# Patient Record
Sex: FEMALE | Race: WHITE | NOT HISPANIC OR LATINO | Employment: FULL TIME | URBAN - METROPOLITAN AREA
[De-identification: names, ages, dates, MRNs, and addresses within clinical notes are randomized per-mention and may not be internally consistent; named-entity substitution may affect disease eponyms.]

---

## 2017-09-19 ENCOUNTER — HOSPITAL ENCOUNTER (OUTPATIENT)
Dept: RADIOLOGY | Facility: MEDICAL CENTER | Age: 57
End: 2017-09-19
Attending: NURSE PRACTITIONER
Payer: COMMERCIAL

## 2017-09-19 DIAGNOSIS — S63.502A SPRAIN OF LEFT WRIST, INITIAL ENCOUNTER: ICD-10-CM

## 2017-09-19 PROCEDURE — 73110 X-RAY EXAM OF WRIST: CPT | Mod: LT

## 2018-02-13 ENCOUNTER — OFFICE VISIT (OUTPATIENT)
Dept: MEDICAL GROUP | Facility: MEDICAL CENTER | Age: 58
End: 2018-02-13
Payer: COMMERCIAL

## 2018-02-13 VITALS
HEART RATE: 76 BPM | TEMPERATURE: 98.7 F | BODY MASS INDEX: 34.12 KG/M2 | HEIGHT: 60 IN | DIASTOLIC BLOOD PRESSURE: 88 MMHG | OXYGEN SATURATION: 98 % | SYSTOLIC BLOOD PRESSURE: 134 MMHG | WEIGHT: 173.8 LBS | RESPIRATION RATE: 16 BRPM

## 2018-02-13 DIAGNOSIS — K21.9 GASTROESOPHAGEAL REFLUX DISEASE, ESOPHAGITIS PRESENCE NOT SPECIFIED: ICD-10-CM

## 2018-02-13 DIAGNOSIS — E66.9 OBESITY (BMI 30-39.9): ICD-10-CM

## 2018-02-13 DIAGNOSIS — E11.9 TYPE 2 DIABETES MELLITUS WITHOUT COMPLICATION, WITHOUT LONG-TERM CURRENT USE OF INSULIN (HCC): ICD-10-CM

## 2018-02-13 DIAGNOSIS — I10 ESSENTIAL HYPERTENSION: ICD-10-CM

## 2018-02-13 DIAGNOSIS — Z76.89 ENCOUNTER TO ESTABLISH CARE: ICD-10-CM

## 2018-02-13 DIAGNOSIS — Z12.31 VISIT FOR SCREENING MAMMOGRAM: ICD-10-CM

## 2018-02-13 DIAGNOSIS — E78.5 DYSLIPIDEMIA: ICD-10-CM

## 2018-02-13 PROCEDURE — 99204 OFFICE O/P NEW MOD 45 MIN: CPT | Performed by: PHYSICIAN ASSISTANT

## 2018-02-13 RX ORDER — LISINOPRIL 10 MG/1
10 TABLET ORAL DAILY
COMMUNITY
End: 2018-03-28 | Stop reason: SDUPTHER

## 2018-02-13 RX ORDER — IBUPROFEN 800 MG/1
800 TABLET ORAL EVERY 8 HOURS PRN
COMMUNITY
End: 2018-08-01 | Stop reason: SDUPTHER

## 2018-02-13 RX ORDER — ROSUVASTATIN CALCIUM 40 MG/1
40 TABLET, COATED ORAL DAILY
COMMUNITY
End: 2018-08-01 | Stop reason: SDUPTHER

## 2018-02-13 ASSESSMENT — PATIENT HEALTH QUESTIONNAIRE - PHQ9: CLINICAL INTERPRETATION OF PHQ2 SCORE: 0

## 2018-02-13 NOTE — LETTER
[facility]  Yaya Frank P.A.-C.  [address]  [place] [address]  Fax: [phone]   Authorization for Release/Disclosure of   Protected Health Information   Name: ROSARIO NULL : 1960 SSN: [id number]   Address: [address] [facility] [address]  [place] [address] Phone:    [phone] (home)    I authorize the entity listed below to release/disclose the PHI below to:   [facility]/Yaya Frank P.A.-C. and Yaya Frank P.A.-C.   Provider or Entity Name:  [facility]   Address   City, State, Zip   Phone:      Fax:     Reason for request: continuity of care   Information to be released:    [ X ] LAST COLONOSCOPY,  including any PATH REPORT and follow-up  [  ] LAST FIT/COLOGUARD RESULT [  ] LAST DEXA  [  ] LAST MAMMOGRAM  [  ] LAST PAP  [  ] LAST LABS [  ] RETINA EXAM REPORT  [  ] IMMUNIZATION RECORDS  [  ] Release all info      [  ] Check here and initial the line next to each item to release ALL health information INCLUDING  _____ Care and treatment for drug and / or alcohol abuse  _____ HIV testing, infection status, or AIDS  _____ Genetic Testing    DATES OF SERVICE OR TIME PERIOD TO BE DISCLOSED: _____________  I understand and acknowledge that:  * This Authorization may be revoked at any time by you in writing, except if your health information has already been used or disclosed.  * Your health information that will be used or disclosed as a result of you signing this authorization could be re-disclosed by the recipient. If this occurs, your re-disclosed health information may no longer be protected by State or Federal laws.  * You may refuse to sign this Authorization. Your refusal will not affect your ability to obtain treatment.  * This Authorization becomes effective upon signing and will  on (date) __________.      If no date is indicated, this Authorization will  one (1) year from the signature date.    Name: Rosario Null    Signature:   Date:      2/13/2018       PLEASE FAX REQUESTED RECORDS BACK TO: [phone]

## 2018-02-13 NOTE — ASSESSMENT & PLAN NOTE
Has a chronic history of reflux. Takes omeprazole daily. Symptoms are well controlled. Likely she had a hiatal hernia. Has been scoped in the past. This occurred approximately 5 years ago with her colonoscopy.

## 2018-02-13 NOTE — PROGRESS NOTES
Subjective:   Rosario Blevins is a 57 y.o. female here today for establishing care and discussed diabetes, hyperlipidemia and essential hypertension.    DM (diabetes mellitus)  This is a 57-year-old female who is here today to establish care and to discuss her diabetes. Her PCP recently left his practice. She has a history of diabetes. Is on metformin 500 mg which she takes half a tablet 3 times a day. Medication will cause abdominal discomfort if he takes a full dose. She also is on Tradjenta which is costly for her. Cost about $40 a month. Her A1c recently has been well controlled. He is due to as she is not been done for 6 months. She is no complications with her diabetes.    Dyslipidemia  Cholesterol has been elevated in the past as well. Chronic condition. Takes Crestor 40 mg daily.    GERD (gastroesophageal reflux disease)  Has a chronic history of reflux. Takes omeprazole daily. Symptoms are well controlled. Likely she had a hiatal hernia. Has been scoped in the past. This occurred approximately 5 years ago with her colonoscopy.    HTN (hypertension)  Has chronic hypertension. Is on lisinopril 20 mg daily.    Obesity (BMI 30-39.9)  She eats healthier than in the past. Has lost weight. Continues to lose weight.       Current medicines (including changes today)  Current Outpatient Prescriptions   Medication Sig Dispense Refill   • ibuprofen (MOTRIN) 800 MG Tab Take 800 mg by mouth every 8 hours as needed.     • rosuvastatin (CRESTOR) 40 MG tablet Take 40 mg by mouth every day.     • linagliptin (TRADJENTA) 5 MG Tab tablet Take 5 mg by mouth every day.     • lisinopril (PRINIVIL) 10 MG Tab Take 10 mg by mouth every day.     • metFORMIN (GLUCOPHAGE) 500 MG Tab Take 1 Tab by mouth 3 times a day. 90 Tab 11   • Multiple Vitamins-Minerals (CENTRUM SILVER PO) Take  by mouth every day.     • estradiol (ESTRACE) 1 MG TABS Take 1 mg by mouth every day.     • omeprazole (PRILOSEC) 40 MG capsule Take 20 mg by mouth  every day.       No current facility-administered medications for this visit.      She  has a past medical history of Diabetes; Hyperlipidemia; and Hypertension.    Social History and Family History were reviewed and updated.    ROS   No chest pain, no shortness of breath, no abdominal pain and all other systems were reviewed and are negative.       Objective:     Blood pressure 134/88, pulse 76, temperature 37.1 °C (98.7 °F), resp. rate 16, height 1.524 m (5'), weight 78.8 kg (173 lb 12.8 oz), SpO2 98 %. Body mass index is 33.94 kg/m².   Physical Exam:  Constitutional: Alert, no distress.  Skin: Warm, dry, good turgor, no rashes in visible areas.  Eye: Equal, round and reactive, conjunctiva clear, lids normal.  ENMT: Lips without lesions, good dentition, oropharynx clear.  Neck: Trachea midline, no masses.   Lymph: No cervical or supraclavicular lymphadenopathy  Respiratory: Unlabored respiratory effort, lungs appear clear, no wheezes.  Cardiovascular: Normal S1, S2, no murmur, no edema.  Abdomen: Soft, non-tender, no masses.  Psych: Alert and oriented x3, normal affect and mood.        Assessment and Plan:   The following treatment plan was discussed    1. Type 2 diabetes mellitus without complication, without long-term current use of insulin (CMS-[facility])  Chronic condition. Status unknown. Likely controlled. Reviewed metformin 500 mg 3 times a day. May take half a tablet 3 times a day. Also provided lab orders.  - COMP METABOLIC PANEL; Future  - HEMOGLOBIN A1C; Future  - LIPID PROFILE; Future  - MICROALBUMIN CREAT RATIO URINE (LAB COLLECT); Future  - metFORMIN (GLUCOPHAGE) 500 MG Tab; Take 1 Tab by mouth 3 times a day.  Dispense: 90 Tab; Refill: 11    2. Dyslipidemia  Chronic condition. Stable. Lipid panel ordered. Continue Crestor.    3. Obesity (BMI 30-39.9)  Chronic condition. Improving. Continue eat healthier.  - Patient identified as having weight management issue.  Appropriate orders and counseling  given.    4. Essential hypertension  Chronic condition. Slightly elevated today. Follow-up in 3 months to recheck blood pressure. Continue lisinopril 10 mg daily.    5. Gastroesophageal reflux disease, esophagitis presence not specified  Chronic condition. Stable. Continue omeprazole as directed.    6. Visit for screening mammogram  Ordered mammogram.  - MA-SCREEN MAMMO W/CAD-BILAT; Future    7. Encounter to establish care      Followup: Return if symptoms worsen or fail to improve.    Please note that this dictation was created using voice recognition software. I have made every reasonable attempt to correct obvious errors, but I expect that there are errors of grammar and possibly content that I did not discover before finalizing the note.

## 2018-02-13 NOTE — LETTER
[facility]  Yaya Frank P.A.-C.  [address]  [place] [address]  Fax: [phone]   Authorization for Release/Disclosure of   Protected Health Information   Name: ROSARIO NULL : 1960 SSN: [id number]   Address: [address]  [place] [address] Phone:    [phone] (home)    I authorize the entity listed below to release/disclose the PHI below to:   [facility]/Yaya Frank P.A.-C. and Yaya Frank P.A.-C.   Provider or Entity Name:     Address   City, State, Zip   Phone:      Fax:     Reason for request: continuity of care   Information to be released:    [  ] LAST COLONOSCOPY,  including any PATH REPORT and follow-up  [  ] LAST FIT/COLOGUARD RESULT [  ] LAST DEXA  [  ] LAST MAMMOGRAM  [  ] LAST PAP  [  ] LAST LABS [  ] RETINA EXAM REPORT  [  ] IMMUNIZATION RECORDS  [  ] Release all info      [  ] Check here and initial the line next to each item to release ALL health information INCLUDING  _____ Care and treatment for drug and / or alcohol abuse  _____ HIV testing, infection status, or AIDS  _____ Genetic Testing    DATES OF SERVICE OR TIME PERIOD TO BE DISCLOSED: _____________  I understand and acknowledge that:  * This Authorization may be revoked at any time by you in writing, except if your health information has already been used or disclosed.  * Your health information that will be used or disclosed as a result of you signing this authorization could be re-disclosed by the recipient. If this occurs, your re-disclosed health information may no longer be protected by State or Federal laws.  * You may refuse to sign this Authorization. Your refusal will not affect your ability to obtain treatment.  * This Authorization becomes effective upon signing and will  on (date) __________.      If no date is indicated, this Authorization will  one (1) year from the signature date.    Name: Rosario Null    Signature:   Date:     2018          PLEASE FAX REQUESTED RECORDS BACK TO: [phone]

## 2018-02-13 NOTE — ASSESSMENT & PLAN NOTE
This is a 57-year-old female who is here today to establish care and to discuss her diabetes. Her PCP recently left his practice. She has a history of diabetes. Is on metformin 500 mg which she takes half a tablet 3 times a day. Medication will cause abdominal discomfort if he takes a full dose. She also is on Tradjenta which is costly for her. Cost about $40 a month. Her A1c recently has been well controlled. He is due to as she is not been done for 6 months. She is no complications with her diabetes.

## 2018-02-28 ENCOUNTER — APPOINTMENT (OUTPATIENT)
Dept: RADIOLOGY | Facility: MEDICAL CENTER | Age: 58
End: 2018-02-28
Attending: PHYSICIAN ASSISTANT
Payer: COMMERCIAL

## 2018-03-06 ENCOUNTER — HOSPITAL ENCOUNTER (OUTPATIENT)
Dept: RADIOLOGY | Facility: MEDICAL CENTER | Age: 58
End: 2018-03-06
Attending: PHYSICIAN ASSISTANT
Payer: COMMERCIAL

## 2018-03-06 DIAGNOSIS — Z12.31 VISIT FOR SCREENING MAMMOGRAM: ICD-10-CM

## 2018-03-06 PROCEDURE — 77063 BREAST TOMOSYNTHESIS BI: CPT

## 2018-03-28 ENCOUNTER — PATIENT MESSAGE (OUTPATIENT)
Dept: MEDICAL GROUP | Facility: MEDICAL CENTER | Age: 58
End: 2018-03-28

## 2018-03-28 RX ORDER — LISINOPRIL 10 MG/1
10 TABLET ORAL DAILY
Qty: 30 TAB | Refills: 5 | Status: SHIPPED | OUTPATIENT
Start: 2018-03-28 | End: 2018-04-26 | Stop reason: SDUPTHER

## 2018-04-19 ENCOUNTER — HOSPITAL ENCOUNTER (OUTPATIENT)
Dept: LAB | Facility: MEDICAL CENTER | Age: 58
End: 2018-04-19
Attending: PHYSICIAN ASSISTANT
Payer: COMMERCIAL

## 2018-04-19 DIAGNOSIS — E11.9 TYPE 2 DIABETES MELLITUS WITHOUT COMPLICATION, WITHOUT LONG-TERM CURRENT USE OF INSULIN (HCC): ICD-10-CM

## 2018-04-19 LAB
ALBUMIN SERPL BCP-MCNC: 4 G/DL (ref 3.2–4.9)
ALBUMIN/GLOB SERPL: 1.3 G/DL
ALP SERPL-CCNC: 40 U/L (ref 30–99)
ALT SERPL-CCNC: 57 U/L (ref 2–50)
ANION GAP SERPL CALC-SCNC: 8 MMOL/L (ref 0–11.9)
AST SERPL-CCNC: 34 U/L (ref 12–45)
BILIRUB SERPL-MCNC: 0.6 MG/DL (ref 0.1–1.5)
BUN SERPL-MCNC: 15 MG/DL (ref 8–22)
CALCIUM SERPL-MCNC: 9.3 MG/DL (ref 8.5–10.5)
CHLORIDE SERPL-SCNC: 101 MMOL/L (ref 96–112)
CHOLEST SERPL-MCNC: 165 MG/DL (ref 100–199)
CO2 SERPL-SCNC: 26 MMOL/L (ref 20–33)
CREAT SERPL-MCNC: 0.55 MG/DL (ref 0.5–1.4)
CREAT UR-MCNC: 46.3 MG/DL
EST. AVERAGE GLUCOSE BLD GHB EST-MCNC: 183 MG/DL
GLOBULIN SER CALC-MCNC: 3.2 G/DL (ref 1.9–3.5)
GLUCOSE SERPL-MCNC: 159 MG/DL (ref 65–99)
HBA1C MFR BLD: 8 % (ref 0–5.6)
HDLC SERPL-MCNC: 38 MG/DL
LDLC SERPL CALC-MCNC: 82 MG/DL
MICROALBUMIN UR-MCNC: <0.7 MG/DL
MICROALBUMIN/CREAT UR: NORMAL MG/G (ref 0–30)
POTASSIUM SERPL-SCNC: 4.3 MMOL/L (ref 3.6–5.5)
PROT SERPL-MCNC: 7.2 G/DL (ref 6–8.2)
SODIUM SERPL-SCNC: 135 MMOL/L (ref 135–145)
TRIGL SERPL-MCNC: 224 MG/DL (ref 0–149)

## 2018-04-19 PROCEDURE — 80053 COMPREHEN METABOLIC PANEL: CPT

## 2018-04-19 PROCEDURE — 80061 LIPID PANEL: CPT

## 2018-04-19 PROCEDURE — 36415 COLL VENOUS BLD VENIPUNCTURE: CPT

## 2018-04-19 PROCEDURE — 82570 ASSAY OF URINE CREATININE: CPT

## 2018-04-19 PROCEDURE — 83036 HEMOGLOBIN GLYCOSYLATED A1C: CPT

## 2018-04-19 PROCEDURE — 82043 UR ALBUMIN QUANTITATIVE: CPT

## 2018-04-24 ENCOUNTER — TELEPHONE (OUTPATIENT)
Dept: MEDICAL GROUP | Facility: MEDICAL CENTER | Age: 58
End: 2018-04-24

## 2018-04-24 NOTE — TELEPHONE ENCOUNTER
ESTABLISHED PATIENT PRE-VISIT PLANNING     Note: Patient will not be contacted if there is no indication to call.     1.  Reviewed notes from the last few office visits within the medical group: Yes 02/13/2018    2.  If any orders were placed at last visit or intended to be done for this visit (i.e. 6 mos follow-up), do we have Results/Consult Notes?        •  Labs - Labs ordered, completed on 04/19/2018 and results are in chart.   Note: If patient appointment is for lab review and patient did not complete labs, check with provider if OK to reschedule patient until labs completed.       •  Imaging - Imaging ordered, completed and results are in chart.       •  Referrals - No referrals were ordered at last office visit.    3. Is this appointment scheduled as a Hospital Follow-Up? No    4.  Immunizations were updated in [place] using WebIZ?: Yes       •  Web Iz Recommendations: MMR , TD and VARICELLA (Chicken Pox)     5.  Patient is due for the following Health Maintenance Topics:   Health Maintenance Due   Topic Date Due   • IMM HEP B VACCINE (1 of 3 - Primary Series) 1960   • DIABETES MONOFILAMENT / LE EXAM  06/13/1961   • RETINAL SCREENING  12/13/1978   • IMM PNEUMOCOCCAL 19-64 (ADULT) MEDIUM RISK SERIES (1 of 1 - PPSV23) 12/13/1979         6.  MDX printed for Provider? NO    7.  Patient was NOT informed to arrive 15 min prior to their scheduled appointment and bring in their medication bottles.

## 2018-04-26 ENCOUNTER — OFFICE VISIT (OUTPATIENT)
Dept: MEDICAL GROUP | Facility: MEDICAL CENTER | Age: 58
End: 2018-04-26
Payer: COMMERCIAL

## 2018-04-26 VITALS
SYSTOLIC BLOOD PRESSURE: 144 MMHG | HEIGHT: 60 IN | DIASTOLIC BLOOD PRESSURE: 84 MMHG | HEART RATE: 90 BPM | OXYGEN SATURATION: 98 % | WEIGHT: 180 LBS | BODY MASS INDEX: 35.34 KG/M2 | TEMPERATURE: 97.9 F

## 2018-04-26 DIAGNOSIS — I10 ESSENTIAL HYPERTENSION: ICD-10-CM

## 2018-04-26 DIAGNOSIS — E11.9 TYPE 2 DIABETES MELLITUS WITHOUT COMPLICATION, WITHOUT LONG-TERM CURRENT USE OF INSULIN (HCC): ICD-10-CM

## 2018-04-26 DIAGNOSIS — E78.5 DYSLIPIDEMIA: ICD-10-CM

## 2018-04-26 PROCEDURE — 99214 OFFICE O/P EST MOD 30 MIN: CPT | Performed by: PHYSICIAN ASSISTANT

## 2018-04-26 RX ORDER — LISINOPRIL 20 MG/1
20 TABLET ORAL DAILY
Qty: 90 TAB | Refills: 3 | Status: SHIPPED | OUTPATIENT
Start: 2018-04-26 | End: 2019-04-18 | Stop reason: SDUPTHER

## 2018-04-26 NOTE — ASSESSMENT & PLAN NOTE
History of hyperlipidemia. Takes half of a Crestor 40 mg tablet at nighttime. Medication makes her fall asleep. Recent lipid panel was decent. LDL at 82. Triglycerides are elevated.

## 2018-04-26 NOTE — ASSESSMENT & PLAN NOTE
Has chronic hypertension. Last 2 instances where she was seen blood pressure was elevated. Takes lisinopril 10 mg daily.

## 2018-04-26 NOTE — ASSESSMENT & PLAN NOTE
This is a 57-year-old female who is here today to discuss diabetes. She states recently she has been eating more green bananas. She feels that her sugars are elevated. She continues to take her oral medications. She cuts metformin 500 mg tablets in half and takes that throughout the day. Has significant diarrhea if she takes a full tablet. Also on Tradjenta 5 mg daily. She is not interested in injectable medications to help with her diabetes. Recent A1c at 8.0. Did improve from 10.3. She has yet to get a retinal scan. Wants to follow with her optometrist in [place].

## 2018-04-26 NOTE — PROGRESS NOTES
Subjective:   Rosario Blevins is a 57 y.o. female here today for diabetes and hypertension.    DM (diabetes mellitus)  This is a 57-year-old female who is here today to discuss diabetes. She states recently she has been eating more green bananas. She feels that her sugars are elevated. She continues to take her oral medications. She cuts metformin 500 mg tablets in half and takes that throughout the day. Has significant diarrhea if she takes a full tablet. Also on Tradjenta 5 mg daily. She is not interested in injectable medications to help with her diabetes. Recent A1c at 8.0. Did improve from 10.3. She has yet to get a retinal scan. Wants to follow with her optometrist in [place].    HTN (hypertension)  Has chronic hypertension. Last 2 instances where she was seen blood pressure was elevated. Takes lisinopril 10 mg daily.    Dyslipidemia  History of hyperlipidemia. Takes half of a Crestor 40 mg tablet at nighttime. Medication makes her fall asleep. Recent lipid panel was decent. LDL at 82. Triglycerides are elevated.       Current medicines (including changes today)  Current Outpatient Prescriptions   Medication Sig Dispense Refill   • lisinopril (PRINIVIL) 20 MG Tab Take 1 Tab by mouth every day. 90 Tab 3   • linagliptin (TRADJENTA) 5 MG Tab tablet Take 1 Tab by mouth every day. 30 Tab 5   • ibuprofen (MOTRIN) 800 MG Tab Take 800 mg by mouth every 8 hours as needed.     • rosuvastatin (CRESTOR) 40 MG tablet Take 40 mg by mouth every day.     • metFORMIN (GLUCOPHAGE) 500 MG Tab Take 1 Tab by mouth 3 times a day. 90 Tab 11   • Multiple Vitamins-Minerals (CENTRUM SILVER PO) Take  by mouth every day.     • estradiol (ESTRACE) 1 MG TABS Take 1 mg by mouth every day.     • omeprazole (PRILOSEC) 40 MG capsule Take 20 mg by mouth every day.       No current facility-administered medications for this visit.      She  has a past medical history of Diabetes; Hyperlipidemia; and Hypertension.    Social History and Family  History were reviewed and updated.    ROS   No chest pain, no shortness of breath, no abdominal pain and all other systems were reviewed and are negative.       Objective:     Blood pressure 144/84, pulse 90, temperature 36.6 °C (97.9 °F), height 1.524 m (5'), weight 81.6 kg (180 lb), SpO2 98 %. Body mass index is 35.15 kg/m².   Physical Exam:  Constitutional: Alert, no distress.  Skin: Warm, dry, good turgor, no rashes in visible areas.  Eye: Equal, round and reactive, conjunctiva clear, lids normal.  ENMT: Lips without lesions, good dentition, oropharynx clear.  Neck: Trachea midline, no masses.   Lymph: No cervical or supraclavicular lymphadenopathy  Respiratory: Unlabored respiratory effort, lungs clear to auscultation, no wheezes, no ronchi.  Cardiovascular: Normal S1, S2, no murmur, no edema.  Psych: Alert and oriented x3, normal affect and mood.        Assessment and Plan:   The following treatment plan was discussed    1. Type 2 diabetes mellitus without complication, without long-term current use of insulin (CMS-HCC)  Chronic condition. A1c recently 8.0. Advised exercise routinely. Offered referral to diabetic education but she declined. Offered referral to endocrinology but she declined. Repeat A1c and lipid panel in 6 months. 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 Advised to follow-up in 7 months.  - lisinopril (PRINIVIL) 20 MG Tab; Take 1 Tab by mouth every day.  Dispense: 90 Tab; Refill: 3  - HEMOGLOBIN A1C; Future    2. Dyslipidemia  Chronic condition. Stable. Advised exercise routinely and eat healthier. Lipid panel ordered for 6 months.  - LIPID PROFILE; Future    3. Essential hypertension  Chronic condition. Uncontrolled. Increase lisinopril to 20 mg daily. Discussed side effects. Follow-up labs in 6 months.  - lisinopril (PRINIVIL) 20 MG Tab; Take 1 Tab by mouth every day.  Dispense: 90 Tab; Refill: 3      Followup: Return in about 6 months (around 10/26/2018), or if symptoms worsen or  fail to improve.    Please note that this dictation was created using voice recognition software. I have made every reasonable attempt to correct obvious errors, but I expect that there are errors of grammar and possibly content that I did not discover before finalizing the note.

## 2018-05-02 RX ORDER — ESTRADIOL 1 MG/1
1 TABLET ORAL DAILY
Qty: 30 TAB | Refills: 5 | Status: SHIPPED | OUTPATIENT
Start: 2018-05-02 | End: 2018-10-24 | Stop reason: SDUPTHER

## 2018-07-31 ENCOUNTER — PATIENT MESSAGE (OUTPATIENT)
Dept: MEDICAL GROUP | Facility: MEDICAL CENTER | Age: 58
End: 2018-07-31

## 2018-08-01 RX ORDER — IBUPROFEN 800 MG/1
800 TABLET ORAL EVERY 8 HOURS PRN
Qty: 30 TAB | Refills: 3 | Status: SHIPPED | OUTPATIENT
Start: 2018-08-01 | End: 2019-01-17

## 2018-08-01 RX ORDER — ROSUVASTATIN CALCIUM 40 MG/1
40 TABLET, COATED ORAL DAILY
Qty: 30 TAB | Refills: 3 | Status: SHIPPED | OUTPATIENT
Start: 2018-08-01 | End: 2019-01-17 | Stop reason: SDUPTHER

## 2018-08-01 RX ORDER — OMEPRAZOLE 40 MG/1
40 CAPSULE, DELAYED RELEASE ORAL DAILY
Qty: 30 CAP | Refills: 3 | Status: SHIPPED | OUTPATIENT
Start: 2018-08-01 | End: 2018-11-26 | Stop reason: SDUPTHER

## 2018-10-11 ENCOUNTER — HOSPITAL ENCOUNTER (OUTPATIENT)
Dept: LAB | Facility: MEDICAL CENTER | Age: 58
End: 2018-10-11
Attending: PHYSICIAN ASSISTANT
Payer: COMMERCIAL

## 2018-10-11 ENCOUNTER — TELEPHONE (OUTPATIENT)
Dept: MEDICAL GROUP | Facility: MEDICAL CENTER | Age: 58
End: 2018-10-11

## 2018-10-11 DIAGNOSIS — E78.5 DYSLIPIDEMIA: ICD-10-CM

## 2018-10-11 DIAGNOSIS — E11.9 TYPE 2 DIABETES MELLITUS WITHOUT COMPLICATION, WITHOUT LONG-TERM CURRENT USE OF INSULIN (HCC): ICD-10-CM

## 2018-10-11 LAB
CHOLEST SERPL-MCNC: 159 MG/DL (ref 100–199)
EST. AVERAGE GLUCOSE BLD GHB EST-MCNC: 232 MG/DL
FASTING STATUS PATIENT QL REPORTED: NORMAL
HBA1C MFR BLD: 9.7 % (ref 0–5.6)
HDLC SERPL-MCNC: 40 MG/DL
LDLC SERPL CALC-MCNC: 75 MG/DL
TRIGL SERPL-MCNC: 222 MG/DL (ref 0–149)

## 2018-10-11 PROCEDURE — 36415 COLL VENOUS BLD VENIPUNCTURE: CPT

## 2018-10-11 PROCEDURE — 80061 LIPID PANEL: CPT

## 2018-10-11 PROCEDURE — 83036 HEMOGLOBIN GLYCOSYLATED A1C: CPT

## 2018-10-11 NOTE — TELEPHONE ENCOUNTER
ESTABLISHED PATIENT PRE-VISIT PLANNING     Note: Patient will not be contacted if there is no indication to call.     1.  Reviewed notes from the last few office visits within the medical group: Yes  04/16/2018  2.  If any orders were placed at last visit or intended to be done for this visit (i.e. 6 mos follow-up), do we have Results/Consult Notes?        •  Labs - Labs ordered, completed on 10/11/2018 and results are in chart.   Note: If patient appointment is for lab review and patient did not complete labs, check with provider if OK to reschedule patient until labs completed.       •  Imaging - Imaging was not ordered at last office visit.       •  Referrals - No referrals were ordered at last office visit.    3. Is this appointment scheduled as a Hospital Follow-Up? No    4.  Immunizations were updated in [place] using WebIZ?: Yes       •  Web Iz Recommendations: FLU, MMR , TD and ZOSTAVAX (Shingles)    5.  Patient is due for the following Health Maintenance Topics:   Health Maintenance Due   Topic Date Due   • DIABETES MONOFILAMENT / LE EXAM  06[birth year]13/1961   • RETINAL SCREENING / DECLINED  12/13/1978   • IMM HEP B VACCINE (1 of 3 - Risk 3-dose series) 12/13/1979   • IMM PNEUMOCOCCAL 19-64 (ADULT) MEDIUM RISK SERIES (1 of 1 - PPSV23) 12/13/1979   • IMM ZOSTER VACCINES (1 of 2) 12/13/2010   • IMM INFLUENZA (1)  09/01/2018       6.  MDX printed for Provider? NO    7.  Patient was informed to arrive 15 min prior to their scheduled appointment and bring in their medication bottles.

## 2018-10-12 DIAGNOSIS — E11.9 TYPE 2 DIABETES MELLITUS WITHOUT COMPLICATION, WITHOUT LONG-TERM CURRENT USE OF INSULIN (HCC): ICD-10-CM

## 2018-10-18 ENCOUNTER — OFFICE VISIT (OUTPATIENT)
Dept: MEDICAL GROUP | Facility: MEDICAL CENTER | Age: 58
End: 2018-10-18
Payer: COMMERCIAL

## 2018-10-18 VITALS
BODY MASS INDEX: 35.53 KG/M2 | OXYGEN SATURATION: 98 % | HEIGHT: 60 IN | WEIGHT: 181 LBS | TEMPERATURE: 97.2 F | HEART RATE: 88 BPM | SYSTOLIC BLOOD PRESSURE: 121 MMHG | DIASTOLIC BLOOD PRESSURE: 60 MMHG

## 2018-10-18 DIAGNOSIS — D17.1 LIPOMA OF BACK: ICD-10-CM

## 2018-10-18 DIAGNOSIS — E11.65 UNCONTROLLED TYPE 2 DIABETES MELLITUS WITH HYPERGLYCEMIA (HCC): ICD-10-CM

## 2018-10-18 DIAGNOSIS — Z86.19 HISTORY OF SHINGLES: ICD-10-CM

## 2018-10-18 DIAGNOSIS — E78.5 DYSLIPIDEMIA: ICD-10-CM

## 2018-10-18 PROCEDURE — 99214 OFFICE O/P EST MOD 30 MIN: CPT | Performed by: PHYSICIAN ASSISTANT

## 2018-10-18 NOTE — ASSESSMENT & PLAN NOTE
This is a 57-year-old female who is here today to discuss her diabetes. Recently A1c was at 9.7. It is never been above 9 in the past. She is taking Tradjenta and metformin 500 mg 3 times a day. Metformin does cause diarrhea. She is really concerned about taking other medications for her diabetes because of the co-pay. The past she declined insulin because of the co-pay.

## 2018-10-18 NOTE — ASSESSMENT & PLAN NOTE
Complains of a mass on the upper back on the left side. It's been there for several years. Denies any pain. Told by her previous PCP that it was benign.

## 2018-10-18 NOTE — ASSESSMENT & PLAN NOTE
Recent cholesterol profile showed triglycerides in the 200s. She is on Crestor 40 mg daily. Chronic condition.

## 2018-10-24 RX ORDER — ESTRADIOL 1 MG/1
TABLET ORAL
Qty: 30 TAB | Refills: 4 | Status: SHIPPED | OUTPATIENT
Start: 2018-10-24 | End: 2019-03-25 | Stop reason: SDUPTHER

## 2018-11-08 ENCOUNTER — OFFICE VISIT (OUTPATIENT)
Dept: ENDOCRINOLOGY | Facility: MEDICAL CENTER | Age: 58
End: 2018-11-08
Payer: COMMERCIAL

## 2018-11-08 VITALS
DIASTOLIC BLOOD PRESSURE: 98 MMHG | HEART RATE: 106 BPM | HEIGHT: 60 IN | OXYGEN SATURATION: 99 % | SYSTOLIC BLOOD PRESSURE: 144 MMHG | WEIGHT: 177 LBS | BODY MASS INDEX: 34.75 KG/M2

## 2018-11-08 DIAGNOSIS — I10 ESSENTIAL HYPERTENSION: ICD-10-CM

## 2018-11-08 DIAGNOSIS — E11.65 UNCONTROLLED TYPE 2 DIABETES MELLITUS WITH HYPERGLYCEMIA (HCC): ICD-10-CM

## 2018-11-08 PROCEDURE — 99204 OFFICE O/P NEW MOD 45 MIN: CPT | Performed by: PHYSICIAN ASSISTANT

## 2018-11-08 RX ORDER — PIOGLITAZONEHYDROCHLORIDE 30 MG/1
30 TABLET ORAL DAILY
Qty: 30 TAB | Refills: 11 | Status: SHIPPED | OUTPATIENT
Start: 2018-11-08 | End: 2019-01-17

## 2018-11-08 NOTE — PROGRESS NOTES
New Patient Consult Note  Referred by: Yaya Frank P.A.-C.    Reason for consult: Diabetes Management Type 2    HPI:  Rosario Blevins is a 57 y.o. old patient who is seeing us today for diabetes care.  This is a pleasant patient with diabetes and I appreciate the opportunity to participate in the care of this patient.  This is a new patient with me today.    Labs of 10/11/18 HbA1c is  9.7, LDL 75, Trigs are 222, microalbumin is negative, GFR >60  Labs of 4/19/18 HbA1c is 8.0  Labs of 5/12/16 HbA1c is 10.3  Labs of 10/7/15 HbA1c is 9.2  Labs of 5/7/15 HbA1c is 12.9  Labs of 10/15/14 HbA1c is 8.1    BG Diary:11/8/2018  In the AM:  Did not bring    Has been Diabetic since T2 15 years  Has a Glucagon pen at home: no    1. Uncontrolled type 2 diabetes mellitus with hyperglycemia (HCC)  This is a new patient on 11/8/18  She is on:  1.  Metformin 500mg TID  2.  Tradjenta 5mg     STOP:  1.  Metformin 500mg TID  2.  Tradjenta 5mg     START:  1.  Ozempic 0.25 once a month on Thursday  2.  Xigduo 10/500 one a day in the AM  3.  Actos 30 one a day    2. Essential hypertension    This is low normal and will continue to follow      ROS:   Constitutional: No change in weight , No fatigue, No night sweats.  HEENT: No Headache.  Eyes:  No blurred vision, No visual changes.  Cardiac: No chest pain, No palpitations.  Resp: No shortness of breath, No cough,   Gastro: No nausea or vomiting, No diarrhea.  Neuro: Denies numbness or tinging in bilateral feet or hands, and no loss of sensation.  Endo: No heat or cold intolerance.  : No polyuria, No polydipsia, No chronic UTI's.  Lower extremities: No lower leg edema bilateral.  All other systems were reviewed and were negative.    Past Medical History:  Patient Active Problem List    Diagnosis Date Noted   • HTN (hypertension) 09/26/2013     Priority: High   • Dyslipidemia 09/26/2013     Priority: Medium   • Uncontrolled type 2 diabetes mellitus with hyperglycemia (HCC)  09/26/2013     Priority: Low   • Lipoma of back 10/18/2018   • History of shingles 10/18/2018   • Obesity (BMI 30-39.9) 02/13/2018   • GERD (gastroesophageal reflux disease) 02/13/2018       Past Surgical History:  Past Surgical History:   Procedure Laterality Date   • HYSTERECTOMY, TOTAL ABDOMINAL     • US-NEEDLE CORE BX-BREAST PANEL         Allergies:  Milk products food allergy and Codeine    Social History:  Social History     Social History   • Marital status: Single     Spouse name: N/A   • Number of children: N/A   • Years of education: N/A     Occupational History   • Not on file.     Social History Main Topics   • Smoking status: Former Smoker     Types: Cigarettes     Quit date: 2/13/1991   • Smokeless tobacco: Never Used   • Alcohol use No   • Drug use: No   • Sexual activity: No     Other Topics Concern   • Not on file     Social History Narrative   • No narrative on file       Family History:  Family History   Problem Relation Age of Onset   • Other Mother 47        Liver cancer   • Cancer Mother         Liver CA   • Other Sister         6 months   • Other Brother         at birth   • Heart Attack Maternal Uncle 40   • Heart Attack Maternal Grandmother 50   • Heart Attack Maternal Uncle 38   • No Known Problems Father        Medications:    Current Outpatient Prescriptions:   •  Dapagliflozin-Metformin HCl ER (XIGDUO XR)  MG TABLET SR 24 HR, Take 1 Tab by mouth every day., Disp: 30 Tab, Rfl: 11  •  Semaglutide (OZEMPIC) 1 MG/DOSE Solution Pen-injector, Inject 1 mg as instructed every 7 days., Disp: 1 PEN, Rfl: 11  •  pioglitazone (ACTOS) 30 MG Tab, Take 1 Tab by mouth every day., Disp: 30 Tab, Rfl: 11  •  estradiol (ESTRACE) 1 MG Tab, TAKE ONE TABLET BY MOUTH ONE TIME DAILY , Disp: 30 Tab, Rfl: 4  •  omeprazole (PRILOSEC) 40 MG delayed-release capsule, Take 1 Cap by mouth every day., Disp: 30 Cap, Rfl: 3  •  rosuvastatin (CRESTOR) 40 MG tablet, Take 1 Tab by mouth every day., Disp: 30 Tab, Rfl:  3  •  ibuprofen (MOTRIN) 800 MG Tab, Take 1 Tab by mouth every 8 hours as needed., Disp: 30 Tab, Rfl: 3  •  lisinopril (PRINIVIL) 20 MG Tab, Take 1 Tab by mouth every day., Disp: 90 Tab, Rfl: 3  •  Multiple Vitamins-Minerals (CENTRUM SILVER PO), Take  by mouth every day., Disp: , Rfl:       Physical Examination:   Vital signs: /98 (BP Location: Right arm, Patient Position: Sitting, BP Cuff Size: Adult)   Pulse (!) 106   Ht 1.524 m (5')   Wt 80.3 kg (177 lb)   SpO2 99%   BMI 34.57 kg/m²   General: No distress, cooperative, well dressed and well nourished.   Eyes: No scleral icterus or discharge, No hyposphagma  ENMT: Normal on external inspection of nose, lips, No nasal drainage   Neck: No abnormal masses on inspection  Resp: Normal effort, Bilateral clear to auscultation, No wheezing, No rales  CVS: Regular rate and rhythm, S1 S2 normal, No murmur. No gallop  Extremities: No edema bilateral extremities  Neuro: Alert and oriented  Skin: No rash, No Ulcers  Psych: Normal mood and affect  Foot exam: normal sensation to monofilament testing, normal pulses, no ulcers.  Normal Vibration quantitative sensation test.    Assessment and Plan:    1. Uncontrolled type 2 diabetes mellitus with hyperglycemia (HCC)    STOP:  1.  Metformin 500mg TID  2.  Tradjenta 5mg     START:  1.  Ozempic 0.25 once a month on Thursday  2.  Xigduo 10/500 one a day in the AM  3.  Actos 30 one a day    2. Essential hypertension  This is a little elevated and the SGLT2 should help    Return in about 3 weeks (around 11/29/2018).    Blood glucose log: Check BG in the morning when wake up, before lunch or dinner and before bed.  So three times a day.  Always bring BG diary to the next office visit.     This patient during there office visit was started on new medication Ozempic and Xigduo and Actos.  Side effects of new medications were discussed with the patient today in the office. The patient was supplied paperwork on this new  medication.    Thank you kindly for allowing me to participate in the diabetes care plan for this patient.    Austin Ochoa PA-C, BC-[facility]  Board Certified - Advanced Diabetes Management  11/08/18    CC:   Yaya Frank P.A.-C.

## 2018-11-08 NOTE — LETTER
[facility]  Yaya Frank P.A.-C.  [address]  [place] [address]  Fax: [phone]   Authorization for Release/Disclosure of   Protected Health Information   Name: ROSARIO NULL : 1960 SSN: [id number]   Address: [address] [facility] [id number]  [place] [address] Phone:    [phone] (home)    I authorize the entity listed below to release/disclose the PHI below to:   [facility]/Yaya Frank P.A.-C. and Austin Ochoa P.A.-C.   Provider or Entity Name:     Address   City, State, Zip   Phone:      Fax:     Reason for request: continuity of care   Information to be released:    [  ] LAST COLONOSCOPY,  including any PATH REPORT and follow-up  [  ] LAST FIT/COLOGUARD RESULT [  ] LAST DEXA  [  ] LAST MAMMOGRAM  [  ] LAST PAP  [  ] LAST LABS [X ] RETINA EXAM REPORT  [  ] IMMUNIZATION RECORDS  [  ] Release all info      [  ] Check here and initial the line next to each item to release ALL health information INCLUDING  _____ Care and treatment for drug and / or alcohol abuse  _____ HIV testing, infection status, or AIDS  _____ Genetic Testing    DATES OF SERVICE OR TIME PERIOD TO BE DISCLOSED: _last eye exam__  I understand and acknowledge that:  * This Authorization may be revoked at any time by you in writing, except if your health information has already been used or disclosed.  * Your health information that will be used or disclosed as a result of you signing this authorization could be re-disclosed by the recipient. If this occurs, your re-disclosed health information may no longer be protected by State or Federal laws.  * You may refuse to sign this Authorization. Your refusal will not affect your ability to obtain treatment.  * This Authorization becomes effective upon signing and will  on (date) _19__.      If no date is indicated, this Authorization will  one (1) year from the signature date.    Name: Rosario Null    Signature:   Date:     2018          PLEASE FAX REQUESTED RECORDS BACK TO: [phone]

## 2018-11-08 NOTE — PATIENT INSTRUCTIONS
STOP:  1.  Metformin 500mg TID  2.  Tradjenta 5mg     START:  1.  Ozempic 0.25 once a month on Thursday  2.  Xigduo 10/500 one a day in the AM  3.  Actos 30 one a day ( at pharmacy)

## 2018-11-15 ENCOUNTER — NON-PROVIDER VISIT (OUTPATIENT)
Dept: HEALTH INFORMATION MANAGEMENT | Facility: MEDICAL CENTER | Age: 58
End: 2018-11-15
Payer: COMMERCIAL

## 2018-11-15 DIAGNOSIS — E11.65 UNCONTROLLED TYPE 2 DIABETES MELLITUS WITH HYPERGLYCEMIA (HCC): ICD-10-CM

## 2018-11-15 PROCEDURE — 97802 MEDICAL NUTRITION INDIV IN: CPT | Performed by: DIETITIAN, REGISTERED

## 2018-11-26 RX ORDER — OMEPRAZOLE 40 MG/1
CAPSULE, DELAYED RELEASE ORAL
Qty: 30 CAP | Refills: 5 | Status: SHIPPED | OUTPATIENT
Start: 2018-11-26 | End: 2019-01-17

## 2018-12-06 ENCOUNTER — OFFICE VISIT (OUTPATIENT)
Dept: ENDOCRINOLOGY | Facility: MEDICAL CENTER | Age: 58
End: 2018-12-06
Payer: COMMERCIAL

## 2018-12-06 VITALS
SYSTOLIC BLOOD PRESSURE: 148 MMHG | HEIGHT: 60 IN | OXYGEN SATURATION: 98 % | DIASTOLIC BLOOD PRESSURE: 88 MMHG | WEIGHT: 174.8 LBS | HEART RATE: 107 BPM | BODY MASS INDEX: 34.32 KG/M2

## 2018-12-06 DIAGNOSIS — I10 ESSENTIAL HYPERTENSION: ICD-10-CM

## 2018-12-06 DIAGNOSIS — E11.65 UNCONTROLLED TYPE 2 DIABETES MELLITUS WITH HYPERGLYCEMIA (HCC): ICD-10-CM

## 2018-12-06 PROCEDURE — 99214 OFFICE O/P EST MOD 30 MIN: CPT | Performed by: PHYSICIAN ASSISTANT

## 2018-12-06 NOTE — PROGRESS NOTES
Return to office Patient Consult Note  Referred by: Yaya Frank P.A.-C.    Reason for consult: Diabetes Management Type 2    HPI:  Rosario Blevins is a 57 y.o. old patient who is seeing us today for diabetes care.  This is a pleasant patient with diabetes and I appreciate the opportunity to participate in the care of this patient.    Labs of 10/11/18 HbA1c is  9.7, LDL 75, Trigs are 222, microalbumin is negative, GFR >60  Labs of 4/19/18 HbA1c is 8.0  Labs of 5/12/16 HbA1c is 10.3  Labs of 10/7/15 HbA1c is 9.2  Labs of 5/7/15 HbA1c is 12.9  Labs of 10/15/14 HbA1c is 8.1    BG Diary:12/6/2018  In the AM:  No log    Weight: today 174 and on 11/8/18 was 177      1. Uncontrolled type 2 diabetes mellitus with hyperglycemia (HCC)    This is a new patient on 11/8/18  She was on:  1.  Metformin 500mg TID  2.  Tradjenta 5mg      STOP:  1.  Metformin 500mg TID  2.  Tradjenta 5mg      START:  1.  Ozempic 0.25 once a month on Thursday  2.  Xigduo 10/500 one a day in the AM (She stopped)  3.  Actos 30 one a day (She stopped)     2. Essential hypertension     This is low normal and will continue to follow       ROS:   Constitutional: No night sweats.  Eyes:  No visual changes.  Cardiac: No chest pain, No palpitations or racing heart rate.  Resp: No shortness of breath, No cough,   Gi: No Diarrhea    All other systems were reviewed and were/are negative.  The ROS was revised/revisited during this office visit from the patients first office visit with me on 11/8/18. Please review the full ROS during the first office visit.    Past Medical History:  Patient Active Problem List    Diagnosis Date Noted   • HTN (hypertension) 09/26/2013     Priority: High   • Dyslipidemia 09/26/2013     Priority: Medium   • Uncontrolled type 2 diabetes mellitus with hyperglycemia (HCC) 09/26/2013     Priority: Low   • Lipoma of back 10/18/2018   • History of shingles 10/18/2018   • Obesity (BMI 30-39.9) 02/13/2018   • GERD (gastroesophageal  reflux disease) 02/13/2018       Past Surgical History:  Past Surgical History:   Procedure Laterality Date   • HYSTERECTOMY, TOTAL ABDOMINAL     • US-NEEDLE CORE BX-BREAST PANEL         Allergies:  Milk products food allergy and Codeine    Social History:  Social History     Social History   • Marital status: Single     Spouse name: N/A   • Number of children: N/A   • Years of education: N/A     Occupational History   • Not on file.     Social History Main Topics   • Smoking status: Former Smoker     Types: Cigarettes     Quit date: 2/13/1991   • Smokeless tobacco: Never Used   • Alcohol use No   • Drug use: No   • Sexual activity: No     Other Topics Concern   • Not on file     Social History Narrative   • No narrative on file       Family History:  Family History   Problem Relation Age of Onset   • Other Mother 47        Liver cancer   • Cancer Mother         Liver CA   • Other Sister         6 months   • Other Brother         at birth   • Heart Attack Maternal Uncle 40   • Heart Attack Maternal Grandmother 50   • Heart Attack Maternal Uncle 38   • No Known Problems Father        Medications:    Current Outpatient Prescriptions:   •  Semaglutide (OZEMPIC) 1 MG/DOSE Solution Pen-injector, Inject 1 mg as instructed every 7 days., Disp: 1 PEN, Rfl: 11  •  omeprazole (PRILOSEC) 40 MG delayed-release capsule, TAKE ONE CAPSULE BY MOUTH ONE TIME DAILY , Disp: 30 Cap, Rfl: 5  •  Dapagliflozin-Metformin HCl ER (XIGDUO XR)  MG TABLET SR 24 HR, Take 1 Tab by mouth every day. (Patient not taking: Reported on 12/6/2018), Disp: 30 Tab, Rfl: 11  •  pioglitazone (ACTOS) 30 MG Tab, Take 1 Tab by mouth every day. (Patient not taking: Reported on 12/6/2018), Disp: 30 Tab, Rfl: 11  •  estradiol (ESTRACE) 1 MG Tab, TAKE ONE TABLET BY MOUTH ONE TIME DAILY , Disp: 30 Tab, Rfl: 4  •  rosuvastatin (CRESTOR) 40 MG tablet, Take 1 Tab by mouth every day., Disp: 30 Tab, Rfl: 3  •  ibuprofen (MOTRIN) 800 MG Tab, Take 1 Tab by mouth  every 8 hours as needed., Disp: 30 Tab, Rfl: 3  •  lisinopril (PRINIVIL) 20 MG Tab, Take 1 Tab by mouth every day., Disp: 90 Tab, Rfl: 3  •  Multiple Vitamins-Minerals (CENTRUM SILVER PO), Take  by mouth every day., Disp: , Rfl:         Physical Examination:   Vital signs: /88 (BP Location: Left arm, Patient Position: Sitting, BP Cuff Size: Adult)   Pulse (!) 107   Ht 1.524 m (5')   Wt 79.3 kg (174 lb 12.8 oz)   SpO2 98%   BMI 34.14 kg/m²   General: No distress, cooperative, well dressed and well nourished.   Eyes: No scleral icterus or discharge, No hyposphagma  ENMT: Normal on external inspection of nose, lips, No nasal drainage   Neck: No abnormal masses on inspection  Resp: Normal effort, Bilateral clear to auscultation, No wheezing, No rales  CVS: Regular rate and rhythm, S1 S2 normal, No murmur. No gallop  Extremities: No edema bilateral extremities  Neuro: Alert and oriented  Skin: No rash, No Ulcers  Psych: Normal mood and affect      Assessment and Plan:    1. Uncontrolled type 2 diabetes mellitus with hyperglycemia (HCC)     START:  1.  Ozempic 0.25 once a month on Thursday  2.  Farxiga 10mg one a day    2. Essential hypertension    THis is stable and no changes needed    Return in about 2 weeks (around 12/20/2018).    Blood glucose log: Check BG in the morning when wake up, before lunch or dinner and before bed.  So three times a day.  Always bring BG diary to the next office visit.     Thank you kindly for allowing me to participate in the diabetes care plan for this patient.    Austin Ochoa PA-C, BC-ADM  Board Certified - Advanced Diabetes Management  12/06/18    CC:   Yaya Frank P.A.-C.

## 2019-01-03 ENCOUNTER — OFFICE VISIT (OUTPATIENT)
Dept: ENDOCRINOLOGY | Facility: MEDICAL CENTER | Age: 59
End: 2019-01-03
Payer: COMMERCIAL

## 2019-01-03 VITALS
SYSTOLIC BLOOD PRESSURE: 136 MMHG | WEIGHT: 176.4 LBS | HEART RATE: 96 BPM | OXYGEN SATURATION: 99 % | DIASTOLIC BLOOD PRESSURE: 88 MMHG | BODY MASS INDEX: 34.63 KG/M2 | HEIGHT: 60 IN

## 2019-01-03 DIAGNOSIS — I10 ESSENTIAL HYPERTENSION: ICD-10-CM

## 2019-01-03 DIAGNOSIS — E11.65 UNCONTROLLED TYPE 2 DIABETES MELLITUS WITH HYPERGLYCEMIA (HCC): ICD-10-CM

## 2019-01-03 LAB
HBA1C MFR BLD: 9.3 % (ref ?–5.8)
INT CON NEG: NORMAL
INT CON POS: NORMAL

## 2019-01-03 PROCEDURE — 83036 HEMOGLOBIN GLYCOSYLATED A1C: CPT | Performed by: PHYSICIAN ASSISTANT

## 2019-01-03 PROCEDURE — 99214 OFFICE O/P EST MOD 30 MIN: CPT | Performed by: PHYSICIAN ASSISTANT

## 2019-01-03 RX ORDER — DAPAGLIFLOZIN 5 MG/1
TABLET, FILM COATED ORAL
COMMUNITY
End: 2019-01-17

## 2019-01-03 NOTE — PROGRESS NOTES
Return to office Patient Consult Note  Referred by: Yaya Frank P.A.-C.    Reason for consult: Diabetes Management Type 2    HPI:  Rosario Blevins is a 58 y.o. old patient who is seeing us today for diabetes care.  This is a pleasant patient with diabetes and I appreciate the opportunity to participate in the care of this patient.    Labs of 10/11/18 HbA1c is  9.7, LDL 75, Trigs are 222, microalbumin is negative, GFR >60  Labs of 4/19/18 HbA1c is 8.0  Labs of 5/12/16 HbA1c is 10.3  Labs of 10/7/15 HbA1c is 9.2  Labs of 5/7/15 HbA1c is 12.9  Labs of 10/15/14 HbA1c is 8.1     BG Diary:12/6/2018  In the AM:  No log     Weight: today 176 and on 11/8/18 was 177     BG Diary:1/3/2019  In the AM: 191, 192, 184, 166, 196, 214, 194      1. Uncontrolled type 2 diabetes mellitus with hyperglycemia (HCC)    This is a new patient on 11/8/18  She was on:  1.  Metformin 500mg TID  2.  Tradjenta 5mg      STOP:  1.  Metformin 500mg TID  2.  Tradjenta 5mg      Now on:  1.  Ozempic 0.25 once a month on Thursday  2.   Farxiga 10mg one a day    2. Essential hypertension  This is stable and no changes needed      ROS:   Constitutional: No night sweats.  Eyes:  No visual changes.  Cardiac: No chest pain, No palpitations or racing heart rate.  Resp: No shortness of breath, No cough,   Gi: No Diarrhea    All other systems were reviewed and were/are negative.  The ROS was revised/revisited during this office visit from the patients first office visit with me on 11/8/18  Please review the full ROS during the first office visit.    Past Medical History:  Patient Active Problem List    Diagnosis Date Noted   • HTN (hypertension) 09/26/2013     Priority: High   • Dyslipidemia 09/26/2013     Priority: Medium   • Uncontrolled type 2 diabetes mellitus with hyperglycemia (HCC) 09/26/2013     Priority: Low   • Lipoma of back 10/18/2018   • History of shingles 10/18/2018   • Obesity (BMI 30-39.9) 02/13/2018   • GERD (gastroesophageal reflux  disease) 02/13/2018       Past Surgical History:  Past Surgical History:   Procedure Laterality Date   • HYSTERECTOMY, TOTAL ABDOMINAL     • US-NEEDLE CORE BX-BREAST PANEL         Allergies:  Milk products food allergy and Codeine    Social History:  Social History     Social History   • Marital status: Single     Spouse name: N/A   • Number of children: N/A   • Years of education: N/A     Occupational History   • Not on file.     Social History Main Topics   • Smoking status: Former Smoker     Types: Cigarettes     Quit date: 2/13/1991   • Smokeless tobacco: Never Used   • Alcohol use No   • Drug use: No   • Sexual activity: No     Other Topics Concern   • Not on file     Social History Narrative   • No narrative on file       Family History:  Family History   Problem Relation Age of Onset   • Other Mother 47        Liver cancer   • Cancer Mother         Liver CA   • Other Sister         6 months   • Other Brother         at birth   • Heart Attack Maternal Uncle 40   • Heart Attack Maternal Grandmother 50   • Heart Attack Maternal Uncle 38   • No Known Problems Father        Medications:    Current Outpatient Prescriptions:   •  Dapagliflozin Propanediol (FARXIGA) 5 MG Tab, Take  by mouth., Disp: , Rfl:   •  omeprazole (PRILOSEC) 40 MG delayed-release capsule, TAKE ONE CAPSULE BY MOUTH ONE TIME DAILY , Disp: 30 Cap, Rfl: 5  •  Semaglutide (OZEMPIC) 1 MG/DOSE Solution Pen-injector, Inject 1 mg as instructed every 7 days., Disp: 1 PEN, Rfl: 11  •  pioglitazone (ACTOS) 30 MG Tab, Take 1 Tab by mouth every day., Disp: 30 Tab, Rfl: 11  •  estradiol (ESTRACE) 1 MG Tab, TAKE ONE TABLET BY MOUTH ONE TIME DAILY , Disp: 30 Tab, Rfl: 4  •  rosuvastatin (CRESTOR) 40 MG tablet, Take 1 Tab by mouth every day., Disp: 30 Tab, Rfl: 3  •  ibuprofen (MOTRIN) 800 MG Tab, Take 1 Tab by mouth every 8 hours as needed., Disp: 30 Tab, Rfl: 3  •  lisinopril (PRINIVIL) 20 MG Tab, Take 1 Tab by mouth every day., Disp: 90 Tab, Rfl: 3  •   Multiple Vitamins-Minerals (CENTRUM SILVER PO), Take  by mouth every day., Disp: , Rfl:   •  Dapagliflozin-Metformin HCl ER (XIGDUO XR)  MG TABLET SR 24 HR, Take 1 Tab by mouth every day. (Patient not taking: Reported on 1/3/2019), Disp: 30 Tab, Rfl: 11        Physical Examination:   Vital signs: /88 (BP Location: Left arm, Patient Position: Sitting, BP Cuff Size: Adult)   Pulse 96   Ht 1.524 m (5')   Wt 80 kg (176 lb 6.4 oz)   SpO2 99%   BMI 34.45 kg/m²   General: No distress, cooperative, well dressed and well nourished.   Eyes: No scleral icterus or discharge, No hyposphagma  ENMT: Normal on external inspection of nose, lips, No nasal drainage   Neck: No abnormal masses on inspection  Resp: Normal effort, Bilateral clear to auscultation, No wheezing, No rales  CVS: Regular rate and rhythm, S1 S2 normal, No murmur. No gallop  Extremities: No edema bilateral extremities  Neuro: Alert and oriented  Skin: No rash, No Ulcers  Psych: Normal mood and affect      Assessment and Plan:    1. Uncontrolled type 2 diabetes mellitus with hyperglycemia (HCC)    Now on:  1.  Ozempic 0.25 once a month on Thursday (INCREASE to 0.5)  2.   Farxiga 10mg one a day  3.  Tresiba 20 units at night    2. Essential hypertension  This is stable and no changes needed    Return in about 1 month (around 2/3/2019).    Blood glucose log: Check BG in the morning when wake up, before lunch or dinner and before bed.  So three times a day.  Always bring BG diary to the next office visit.       Thank you kindly for allowing me to participate in the diabetes care plan for this patient.    Austin Ochoa PA-C, BC-ADM  Board Certified - Advanced Diabetes Management  01/03/19    CC:   Yaya Frank P.A.-C.

## 2019-01-03 NOTE — PATIENT INSTRUCTIONS
Now on:  1.  Ozempic 0.25 once a month on Thursday (INCREASE to 0.5)  2.   Farxiga 10mg one a day  3.  Tresiba 20 units at night

## 2019-01-17 ENCOUNTER — OFFICE VISIT (OUTPATIENT)
Dept: MEDICAL GROUP | Facility: MEDICAL CENTER | Age: 59
End: 2019-01-17
Payer: COMMERCIAL

## 2019-01-17 VITALS
OXYGEN SATURATION: 98 % | SYSTOLIC BLOOD PRESSURE: 169 MMHG | RESPIRATION RATE: 16 BRPM | HEIGHT: 60 IN | WEIGHT: 171 LBS | DIASTOLIC BLOOD PRESSURE: 89 MMHG | TEMPERATURE: 97 F | HEART RATE: 100 BPM | BODY MASS INDEX: 33.57 KG/M2

## 2019-01-17 DIAGNOSIS — Z23 INFLUENZA VACCINATION GIVEN: ICD-10-CM

## 2019-01-17 DIAGNOSIS — I10 ESSENTIAL HYPERTENSION: ICD-10-CM

## 2019-01-17 DIAGNOSIS — K21.9 GASTROESOPHAGEAL REFLUX DISEASE, ESOPHAGITIS PRESENCE NOT SPECIFIED: ICD-10-CM

## 2019-01-17 DIAGNOSIS — E78.5 DYSLIPIDEMIA: ICD-10-CM

## 2019-01-17 DIAGNOSIS — E11.65 UNCONTROLLED TYPE 2 DIABETES MELLITUS WITH HYPERGLYCEMIA (HCC): ICD-10-CM

## 2019-01-17 DIAGNOSIS — E66.9 OBESITY (BMI 30-39.9): ICD-10-CM

## 2019-01-17 PROCEDURE — 99214 OFFICE O/P EST MOD 30 MIN: CPT | Performed by: PHYSICIAN ASSISTANT

## 2019-01-17 RX ORDER — AMLODIPINE BESYLATE 5 MG/1
5 TABLET ORAL DAILY
Qty: 30 TAB | Refills: 3 | Status: SHIPPED | OUTPATIENT
Start: 2019-01-17 | End: 2019-04-18 | Stop reason: SDUPTHER

## 2019-01-17 RX ORDER — ROSUVASTATIN CALCIUM 40 MG/1
40 TABLET, COATED ORAL DAILY
Qty: 30 TAB | Refills: 11 | Status: SHIPPED | OUTPATIENT
Start: 2019-01-17 | End: 2020-01-28 | Stop reason: SDUPTHER

## 2019-01-17 ASSESSMENT — PATIENT HEALTH QUESTIONNAIRE - PHQ9: CLINICAL INTERPRETATION OF PHQ2 SCORE: 0

## 2019-01-17 NOTE — ASSESSMENT & PLAN NOTE
She is been followed closely by endocrinology.  Recent A1c was at 9.0.  Did improve.  She was hesitant to start insulin but she has been taking Tresiba at 20 units at nighttime.  She had some side effects waking up so now takes the medication in the late afternoon.  Was placed on Farsi got but medication caused side effects so she discontinued that medication.  States that Ozempic which she takes on Wednesdays was also decreased from .25 from 0.5.  She has an appointment in the next few weeks with endocrinology.  Is doing better with her diet.  Is eating oatmeal.  Eating varies.  These are all new foods to her.

## 2019-01-17 NOTE — PROGRESS NOTES
Subjective:   Rosario Blevins is a 58 y.o. female here today for hypertension, uncontrolled diabetes, GERD and receiving influenza vaccination at work.    Uncontrolled type 2 diabetes mellitus with hyperglycemia (HCC)  She is been followed closely by endocrinology.  Recent A1c was at 9.0.  Did improve.  She was hesitant to start insulin but she has been taking Tresiba at 20 units at nighttime.  She had some side effects waking up so now takes the medication in the late afternoon.  Was placed on Farsi got but medication caused side effects so she discontinued that medication.  States that Ozempic which she takes on Wednesdays was also decreased from .25 from 0.5.  She has an appointment in the next few weeks with endocrinology.  Is doing better with her diet.  Is eating oatmeal.  Eating varies.  These are all new foods to her.    HTN (hypertension)  This is a 58-year-old female who is here today to have her blood pressure evaluated.  Also discussed other chronic medical conditions.  She is currently taking lisinopril 20 mg.  She denies any problems taking the medication.  Took it this morning at 5 AM.    GERD (gastroesophageal reflux disease)  Still has reflux symptoms but not daily.  Takes omeprazole only when needed.  May be once a week.       Current medicines (including changes today)  Current Outpatient Prescriptions   Medication Sig Dispense Refill   • amLODIPine (NORVASC) 5 MG Tab Take 1 Tab by mouth every day. 30 Tab 3   • rosuvastatin (CRESTOR) 40 MG tablet Take 1 Tab by mouth every day. 30 Tab 11   • Semaglutide (OZEMPIC) 1 MG/DOSE Solution Pen-injector Inject 1 mg as instructed every 7 days. 1 PEN 11   • estradiol (ESTRACE) 1 MG Tab TAKE ONE TABLET BY MOUTH ONE TIME DAILY  30 Tab 4   • Multiple Vitamins-Minerals (CENTRUM SILVER PO) Take  by mouth every day.     • lisinopril (PRINIVIL) 20 MG Tab Take 1 Tab by mouth every day. 90 Tab 3     No current facility-administered medications for this visit.       She  has a past medical history of Diabetes; Hyperlipidemia; and Hypertension.    Social History and Family History were reviewed and updated.    ROS   No chest pain, no shortness of breath, no abdominal pain and all other systems were reviewed and are negative.       Objective:     Blood pressure (!) 169/89, pulse 100, temperature 36.1 °C (97 °F), temperature source Temporal, resp. rate 16, height 1.524 m (5'), weight 77.6 kg (171 lb), SpO2 98 %, not currently breastfeeding. Body mass index is 33.4 kg/m².   Physical Exam:  Constitutional: Alert, no distress.  Skin: Warm, dry, good turgor, no rashes in visible areas.  Eye: Equal, round and reactive, conjunctiva clear, lids normal.  ENMT: Lips without lesions, good dentition, oropharynx clear.  Neck: Trachea midline, no masses.   Lymph: No cervical or supraclavicular lymphadenopathy  Respiratory: Unlabored respiratory effort, lungs clear to auscultation, no wheezes, no ronchi.  Cardiovascular: Normal S1, S2, no murmur, no edema.  Abdomen: Soft, non-tender, no masses.  Psych: Alert and oriented x3, normal affect and mood.        Assessment and Plan:   The following treatment plan was discussed    1. Essential hypertension  Chronic condition.  Uncontrolled.  Advised to cut back on salt intake.  Added Norvasc 5 mg to her regimen.  Continue lisinopril 20 mg.  - amLODIPine (NORVASC) 5 MG Tab; Take 1 Tab by mouth every day.  Dispense: 30 Tab; Refill: 3    2. Uncontrolled type 2 diabetes mellitus with hyperglycemia (HCC)  Chronic condition.  Uncontrolled.  Continue to follow closely with endocrinology.  Continue insulin as well as once weekly medication.    3. Obesity (BMI 30-39.9)  Chronic condition.  Improvement slightly.  Continue to exercise if possible and watch diet.    4. Dyslipidemia  Chronic condition.  Stable.  Renewed Crestor as directed.  - rosuvastatin (CRESTOR) 40 MG tablet; Take 1 Tab by mouth every day.  Dispense: 30 Tab; Refill: 11    5. Influenza  vaccination given  Unable to see it on [place] immunization records.    6. Gastroesophageal reflux disease, esophagitis presence not specified  Chronic condition.  Controlled.  Take omeprazole when needed.      Followup: Return in about 3 months (around 4/17/2019), or if symptoms worsen or fail to improve.    Please note that this dictation was created using voice recognition software. I have made every reasonable attempt to correct obvious errors, but I expect that there are errors of grammar and possibly content that I did not discover before finalizing the note.

## 2019-01-17 NOTE — ASSESSMENT & PLAN NOTE
This is a 58-year-old female who is here today to have her blood pressure evaluated.  Also discussed other chronic medical conditions.  She is currently taking lisinopril 20 mg.  She denies any problems taking the medication.  Took it this morning at 5 AM.

## 2019-02-07 ENCOUNTER — OFFICE VISIT (OUTPATIENT)
Dept: ENDOCRINOLOGY | Facility: MEDICAL CENTER | Age: 59
End: 2019-02-07
Payer: COMMERCIAL

## 2019-02-07 VITALS
DIASTOLIC BLOOD PRESSURE: 80 MMHG | OXYGEN SATURATION: 99 % | WEIGHT: 170 LBS | SYSTOLIC BLOOD PRESSURE: 130 MMHG | HEIGHT: 60 IN | BODY MASS INDEX: 33.38 KG/M2 | HEART RATE: 106 BPM

## 2019-02-07 DIAGNOSIS — I10 ESSENTIAL HYPERTENSION: ICD-10-CM

## 2019-02-07 DIAGNOSIS — E11.65 UNCONTROLLED TYPE 2 DIABETES MELLITUS WITH HYPERGLYCEMIA (HCC): ICD-10-CM

## 2019-02-07 PROCEDURE — 99214 OFFICE O/P EST MOD 30 MIN: CPT | Performed by: PHYSICIAN ASSISTANT

## 2019-02-07 NOTE — PROGRESS NOTES
Return to office Patient Consult Note  Referred by: Yaya Frank P.A.-C.    Reason for consult: Diabetes Management Type 2    HPI:  Rosario Blevins is a 58 y.o. old patient who is seeing us today for diabetes care.  This is a pleasant patient with diabetes and I appreciate the opportunity to participate in the care of this patient.    Labs of 1/3/19 HbA1c 9.3  Labs of 10/11/18 HbA1c is  9.7, LDL 75, Trigs are 222, microalbumin is negative, GFR >60  Labs of 4/19/18 HbA1c is 8.0  Labs of 5/12/16 HbA1c is 10.3  Labs of 10/7/15 HbA1c is 9.2  Labs of 5/7/15 HbA1c is 12.9  Labs of 10/15/14 HbA1c is 8.1    BG Diary:2/7/2019  In the AM: 145, 153, 147, 153, 166, 135    BG Diary:1/3/2019  In the AM: 191, 192, 184, 166, 196, 214, 194    Weight: today 176 and on 11/8/18 was 177      1. Uncontrolled type 2 diabetes mellitus with hyperglycemia (HCC)    This is a new patient on 11/8/18  She was on:  1.  Metformin 500mg TID  2.  Tradjenta 5mg      STOP:  1.  Metformin 500mg TID  2.  Tradjenta 5mg      Now on:  1.  Ozempic 0.5 once a month on Thursday  2.   Farxiga 10mg one a day (Stopped on own)  3.   Tresiba 20 units at night     2. Essential hypertension  This is stable and no changes needed      ROS:   Constitutional: No night sweats.  Eyes:  No visual changes.  Cardiac: No chest pain, No palpitations or racing heart rate.  Resp: No shortness of breath, No cough,   Gi: No Diarrhea    All other systems were reviewed and were/are negative.  The ROS was revised/revisited during this office visit from the patients first office visit with me on 11/8/18  Please review the full ROS during the first office visit.    Past Medical History:  Patient Active Problem List    Diagnosis Date Noted   • HTN (hypertension) 09/26/2013     Priority: High   • Dyslipidemia 09/26/2013     Priority: Medium   • Uncontrolled type 2 diabetes mellitus with hyperglycemia (HCC) 09/26/2013     Priority: Low   • Influenza vaccination given 01/17/2019    • Lipoma of back 10/18/2018   • History of shingles 10/18/2018   • Obesity (BMI 30-39.9) 02/13/2018   • GERD (gastroesophageal reflux disease) 02/13/2018       Past Surgical History:  Past Surgical History:   Procedure Laterality Date   • HYSTERECTOMY, TOTAL ABDOMINAL     • US-NEEDLE CORE BX-BREAST PANEL         Allergies:  Milk products food allergy and Codeine    Social History:  Social History     Social History   • Marital status: Single     Spouse name: N/A   • Number of children: N/A   • Years of education: N/A     Occupational History   • Not on file.     Social History Main Topics   • Smoking status: Former Smoker     Types: Cigarettes     Quit date: 2/13/1991   • Smokeless tobacco: Never Used   • Alcohol use No   • Drug use: No   • Sexual activity: No     Other Topics Concern   • Not on file     Social History Narrative   • No narrative on file       Family History:  Family History   Problem Relation Age of Onset   • Other Mother 47        Liver cancer   • Cancer Mother         Liver CA   • Other Sister         6 months   • Other Brother         at birth   • Heart Attack Maternal Uncle 40   • Heart Attack Maternal Grandmother 50   • Heart Attack Maternal Uncle 38   • No Known Problems Father        Medications:    Current Outpatient Prescriptions:   •  Semaglutide (OZEMPIC) 1 MG/DOSE Solution Pen-injector, Inject 1 mg as instructed every 7 days., Disp: 2 PEN, Rfl: 11  •  Insulin Degludec (TRESIBA FLEXTOUCH) 200 UNIT/ML Solution Pen-injector, Inject 50 Units as instructed every bedtime., Disp: 3 PEN, Rfl: 6  •  amLODIPine (NORVASC) 5 MG Tab, Take 1 Tab by mouth every day., Disp: 30 Tab, Rfl: 3  •  rosuvastatin (CRESTOR) 40 MG tablet, Take 1 Tab by mouth every day., Disp: 30 Tab, Rfl: 11  •  estradiol (ESTRACE) 1 MG Tab, TAKE ONE TABLET BY MOUTH ONE TIME DAILY , Disp: 30 Tab, Rfl: 4  •  lisinopril (PRINIVIL) 20 MG Tab, Take 1 Tab by mouth every day., Disp: 90 Tab, Rfl: 3  •  Multiple Vitamins-Minerals  (CENTRUM SILVER PO), Take  by mouth every day., Disp: , Rfl:         Physical Examination:   Vital signs: /80 (BP Location: Left arm, Patient Position: Sitting)   Pulse (!) 106   Ht 1.524 m (5')   Wt 77.1 kg (170 lb)   SpO2 99%   BMI 33.20 kg/m²   General: No distress, cooperative, well dressed and well nourished.   Eyes: No scleral icterus or discharge, No hyposphagma  ENMT: Normal on external inspection of nose, lips, No nasal drainage   Neck: No abnormal masses on inspection  Resp: Normal effort, Bilateral clear to auscultation, No wheezing, No rales  CVS: Regular rate and rhythm, S1 S2 normal, No murmur. No gallop  Extremities: No edema bilateral extremities  Neuro: Alert and oriented  Skin: No rash, No Ulcers  Psych: Normal mood and affect      Assessment and Plan:    1. Uncontrolled type 2 diabetes mellitus with hyperglycemia (HCC)  Now on:  1.  Ozempic 0.5 once a month on Thursday (INCREASE to 1.0)  2.   Farxiga 10mg one a day (Stopped on own)  3.   Tresiba 20 units at night (INCREASE to 26)    2. Essential hypertension  This is stable today and no new changes are needed or required in today's visit        Return in about 1 month (around 3/7/2019).    Blood glucose log: Check BG in the morning when wake up, before lunch or dinner and before bed.  So three times a day.  Always bring BG diary to the next office visit.         Thank you kindly for allowing me to participate in the diabetes care plan for this patient.    Austin Ochoa PA-C, BC-ADM  Board Certified - [facility]  02/07/19    CC:   Yaya Frank P.A.-C.

## 2019-02-07 NOTE — PATIENT INSTRUCTIONS
Now on:  1.  Ozempic 0.5 once a month on Thursday (INCREASE to 1.0)  2.   Farxiga 10mg one a day (Stopped on own)  3.   Tresiba 20 units at night (INCREASE to 26)

## 2019-02-19 ENCOUNTER — TELEPHONE (OUTPATIENT)
Dept: ENDOCRINOLOGY | Facility: MEDICAL CENTER | Age: 59
End: 2019-02-19

## 2019-02-19 NOTE — TELEPHONE ENCOUNTER
FINAL PRIOR AUTHORIZATION STATUS:    1.  Name of Medication & Dose: Tresiba U-200    2. Prior Auth Status (if approved--length of approval):  Initiated today through CoverMyMeds 2/19/19    3. Action Taken: Pharmacy/Patient Notified: yes    Documentation was scanned into media and attached to this telephone encounter.

## 2019-02-20 ENCOUNTER — TELEPHONE (OUTPATIENT)
Dept: ENDOCRINOLOGY | Facility: MEDICAL CENTER | Age: 59
End: 2019-02-20

## 2019-02-20 NOTE — TELEPHONE ENCOUNTER
FINAL PRIOR AUTHORIZATION STATUS:    1.  Name of Medication & Dose: Tresiba U-200    2. Prior Auth Status (if approved--length of approval): Denied    3. Action Taken: Pharmacy/Patient Notified: No.   Patient needs alternative first. What will you switch her to?    Documentation was scanned into media and attached to this telephone encounter.

## 2019-03-12 ENCOUNTER — OFFICE VISIT (OUTPATIENT)
Dept: ENDOCRINOLOGY | Facility: MEDICAL CENTER | Age: 59
End: 2019-03-12
Payer: COMMERCIAL

## 2019-03-12 VITALS
SYSTOLIC BLOOD PRESSURE: 122 MMHG | DIASTOLIC BLOOD PRESSURE: 82 MMHG | HEART RATE: 110 BPM | BODY MASS INDEX: 33.57 KG/M2 | HEIGHT: 60 IN | WEIGHT: 171 LBS | OXYGEN SATURATION: 98 %

## 2019-03-12 DIAGNOSIS — I10 ESSENTIAL HYPERTENSION: ICD-10-CM

## 2019-03-12 DIAGNOSIS — E11.65 UNCONTROLLED TYPE 2 DIABETES MELLITUS WITH HYPERGLYCEMIA (HCC): ICD-10-CM

## 2019-03-12 PROCEDURE — 99214 OFFICE O/P EST MOD 30 MIN: CPT | Performed by: PHYSICIAN ASSISTANT

## 2019-03-12 NOTE — PROGRESS NOTES
Return to office Patient Consult Note  Referred by: Yaya Frank P.A.-C.    Reason for consult: Diabetes Management Type 2    HPI:  Rosario Blevins is a 58 y.o. old patient who is seeing us today for diabetes care.  This is a pleasant patient with diabetes and I appreciate the opportunity to participate in the care of this patient.    Labs of   Labs of 1/3/19 HbA1c 9.3  Labs of 10/11/18 HbA1c is  9.7, LDL 75, Trigs are 222, microalbumin is negative, GFR >60  Labs of 4/19/18 HbA1c is 8.0  Labs of 5/12/16 HbA1c is 10.3  Labs of 10/7/15 HbA1c is 9.2  Labs of 5/7/15 HbA1c is 12.9  Labs of 10/15/14 HbA1c is 8.1       BG Diary:3/12/2019  In the AM:134, 130, 122, 138, 140, 131, 130      Weight: today 170 and on 11/8/18 was 177      1. Uncontrolled type 2 diabetes mellitus with hyperglycemia (HCC)  This is a new patient on 11/8/18  She was on:  1.  Metformin 500mg TID  2.  Tradjenta 5mg      STOP:  1.  Metformin 500mg TID  2.  Tradjenta 5mg      Now on:  1.   Ozempic 1.0 once a month on Thursday  3.   Lantus 26 units at night    Off SGLT2     2. Essential hypertension  This is stable and no changes needed         ROS:   Constitutional: No night sweats.  Eyes:  No visual changes.  Cardiac: No chest pain, No palpitations or racing heart rate.  Resp: No shortness of breath, No cough,   Gi: No Diarrhea    All other systems were reviewed and were/are negative.  The ROS was revised/revisited during this office visit from the patients first office visit with me on 11/8/18 Please review the full ROS during the first office visit.    Past Medical History:  Patient Active Problem List    Diagnosis Date Noted   • HTN (hypertension) 09/26/2013     Priority: High   • Dyslipidemia 09/26/2013     Priority: Medium   • Uncontrolled type 2 diabetes mellitus with hyperglycemia (HCC) 09/26/2013     Priority: Low   • Influenza vaccination given 01/17/2019   • Lipoma of back 10/18/2018   • History of shingles 10/18/2018   • Obesity (BMI  30-39.9) 02/13/2018   • GERD (gastroesophageal reflux disease) 02/13/2018       Past Surgical History:  Past Surgical History:   Procedure Laterality Date   • HYSTERECTOMY, TOTAL ABDOMINAL     • US-NEEDLE CORE BX-BREAST PANEL         Allergies:  Milk products food allergy and Codeine    Social History:  Social History     Social History   • Marital status: Single     Spouse name: N/A   • Number of children: N/A   • Years of education: N/A     Occupational History   • Not on file.     Social History Main Topics   • Smoking status: Former Smoker     Types: Cigarettes     Quit date: 2/13/1991   • Smokeless tobacco: Never Used   • Alcohol use No   • Drug use: No   • Sexual activity: No     Other Topics Concern   • Not on file     Social History Narrative   • No narrative on file       Family History:  Family History   Problem Relation Age of Onset   • Other Mother 47        Liver cancer   • Cancer Mother         Liver CA   • Other Sister         6 months   • Other Brother         at birth   • Heart Attack Maternal Uncle 40   • Heart Attack Maternal Grandmother 50   • Heart Attack Maternal Uncle 38   • No Known Problems Father        Medications:    Current Outpatient Prescriptions:   •  insulin glargine (LANTUS SOLOSTAR) 100 UNIT/ML Solution Pen-injector injection, Inject 26 Units as instructed every evening., Disp: 5 PEN, Rfl: 2  •  Semaglutide (OZEMPIC) 1 MG/DOSE Solution Pen-injector, Inject 1 mg as instructed every 7 days., Disp: 2 PEN, Rfl: 11  •  amLODIPine (NORVASC) 5 MG Tab, Take 1 Tab by mouth every day., Disp: 30 Tab, Rfl: 3  •  rosuvastatin (CRESTOR) 40 MG tablet, Take 1 Tab by mouth every day., Disp: 30 Tab, Rfl: 11  •  estradiol (ESTRACE) 1 MG Tab, TAKE ONE TABLET BY MOUTH ONE TIME DAILY , Disp: 30 Tab, Rfl: 4  •  lisinopril (PRINIVIL) 20 MG Tab, Take 1 Tab by mouth every day., Disp: 90 Tab, Rfl: 3  •  Multiple Vitamins-Minerals (CENTRUM SILVER PO), Take  by mouth every day., Disp: , Rfl:   •  Insulin Pen  Needle 32 G x 4 mm (NOVOFINE PLUS), 1 Applicator by Does not apply route every day. Using one needle tip with Lantus per day, Disp: 100 Each, Rfl: 3        Physical Examination:   Vital signs: /82 (BP Location: Left arm, Patient Position: Sitting)   Pulse (!) 110   Ht 1.524 m (5')   Wt 77.6 kg (171 lb)   SpO2 98%   BMI 33.40 kg/m²   General: No distress, cooperative, well dressed and well nourished.   Eyes: No scleral icterus or discharge, No hyposphagma  ENMT: Normal on external inspection of nose, lips, No nasal drainage   Neck: No abnormal masses on inspection  Resp: Normal effort, Bilateral clear to auscultation, No wheezing, No rales  CVS: Regular rate and rhythm, S1 S2 normal, No murmur. No gallop  Extremities: No edema bilateral extremities  Neuro: Alert and oriented  Skin: No rash, No Ulcers  Psych: Normal mood and affect      Assessment and Plan:    1. Uncontrolled type 2 diabetes mellitus with hyperglycemia (HCC)    Now on:  1.   Ozempic 1.0 once a month on Thursday  3.   Lantus 26 units at night    2. Essential hypertension  This is stable today and no new changes are needed or required in today's visit    Return in about 2 months (around 5/12/2019).      Thank you kindly for allowing me to participate in the diabetes care plan for this patient.    Austin Ochoa PA-C, BC-ADM  Board Certified - [facility]  03/12/19    CC:   Yaya Frank P.A.-C.

## 2019-03-25 RX ORDER — ESTRADIOL 1 MG/1
TABLET ORAL
Qty: 30 TAB | Refills: 3 | Status: SHIPPED | OUTPATIENT
Start: 2019-03-25 | End: 2019-07-18 | Stop reason: SDUPTHER

## 2019-03-27 ENCOUNTER — HOSPITAL ENCOUNTER (OUTPATIENT)
Dept: RADIOLOGY | Facility: MEDICAL CENTER | Age: 59
End: 2019-03-27
Attending: PHYSICIAN ASSISTANT
Payer: COMMERCIAL

## 2019-03-27 DIAGNOSIS — Z12.31 VISIT FOR SCREENING MAMMOGRAM: ICD-10-CM

## 2019-03-27 PROCEDURE — 77063 BREAST TOMOSYNTHESIS BI: CPT

## 2019-04-18 ENCOUNTER — OFFICE VISIT (OUTPATIENT)
Dept: MEDICAL GROUP | Facility: MEDICAL CENTER | Age: 59
End: 2019-04-18
Payer: COMMERCIAL

## 2019-04-18 VITALS
DIASTOLIC BLOOD PRESSURE: 78 MMHG | SYSTOLIC BLOOD PRESSURE: 124 MMHG | BODY MASS INDEX: 33.38 KG/M2 | TEMPERATURE: 98.6 F | HEART RATE: 98 BPM | WEIGHT: 170 LBS | HEIGHT: 60 IN | RESPIRATION RATE: 16 BRPM | OXYGEN SATURATION: 96 %

## 2019-04-18 DIAGNOSIS — I10 ESSENTIAL HYPERTENSION: ICD-10-CM

## 2019-04-18 DIAGNOSIS — E11.65 UNCONTROLLED TYPE 2 DIABETES MELLITUS WITH HYPERGLYCEMIA (HCC): ICD-10-CM

## 2019-04-18 DIAGNOSIS — D17.1 LIPOMA OF BACK: ICD-10-CM

## 2019-04-18 DIAGNOSIS — M79.605 LEFT LEG PAIN: ICD-10-CM

## 2019-04-18 PROBLEM — Z23 INFLUENZA VACCINATION GIVEN: Status: RESOLVED | Noted: 2019-01-17 | Resolved: 2019-04-18

## 2019-04-18 LAB
HBA1C MFR BLD: 7.1 % (ref 0–5.6)
INT CON NEG: ABNORMAL
INT CON POS: ABNORMAL

## 2019-04-18 PROCEDURE — 83036 HEMOGLOBIN GLYCOSYLATED A1C: CPT | Performed by: PHYSICIAN ASSISTANT

## 2019-04-18 PROCEDURE — 99214 OFFICE O/P EST MOD 30 MIN: CPT | Performed by: PHYSICIAN ASSISTANT

## 2019-04-18 RX ORDER — AMLODIPINE BESYLATE 5 MG/1
5 TABLET ORAL DAILY
Qty: 30 TAB | Refills: 11 | Status: SHIPPED | OUTPATIENT
Start: 2019-04-18 | End: 2020-05-07

## 2019-04-18 RX ORDER — LISINOPRIL 20 MG/1
20 TABLET ORAL DAILY
Qty: 30 TAB | Refills: 11 | Status: SHIPPED | OUTPATIENT
Start: 2019-04-18 | End: 2020-04-08 | Stop reason: SDUPTHER

## 2019-04-18 RX ORDER — LISINOPRIL 20 MG/1
20 TABLET ORAL DAILY
Qty: 90 TAB | Refills: 3 | Status: SHIPPED | OUTPATIENT
Start: 2019-04-18 | End: 2019-04-18 | Stop reason: SDUPTHER

## 2019-04-18 RX ORDER — AMLODIPINE BESYLATE 5 MG/1
5 TABLET ORAL DAILY
Qty: 90 TAB | Refills: 3 | Status: SHIPPED | OUTPATIENT
Start: 2019-04-18 | End: 2019-04-18 | Stop reason: SDUPTHER

## 2019-04-18 NOTE — ASSESSMENT & PLAN NOTE
This is a 58-year-old female who is here today to discuss her diabetes.  Her diabetic ranges of glucose have been in the 130s or lower.  She is very happy with the results.  Currently taking Ozempic on Tuesday nights.  Also uses her Lantus before 5 PM daily.  Unable to take it at nighttime because of her insomnia.  She has an appointment with endocrinology in May.  Takes Lantus before 5 PM. Ozempic on Tuesday night.

## 2019-04-18 NOTE — ASSESSMENT & PLAN NOTE
Chronic history.  Does develop pain at times below the lipoma.  Does not want the lipoma removed yet.

## 2019-04-18 NOTE — PROGRESS NOTES
Subjective:   Rosario Blevins is a 58 y.o. female here today for diabetes, hypertension, lipoma in the back and left leg pain which is been a chronic issue.    Uncontrolled type 2 diabetes mellitus with hyperglycemia (HCC)  This is a 58-year-old female who is here today to discuss her diabetes.  Her diabetic ranges of glucose have been in the 130s or lower.  She is very happy with the results.  Currently taking Ozempic on Tuesday nights.  Also uses her Lantus before 5 PM daily.  Unable to take it at nighttime because of her insomnia.  She has an appointment with endocrinology in May.  Takes Lantus before 5 PM. Ozempic on Tuesday night.      HTN (hypertension)  Chronic history.  Takes both amlodipine and lisinopril.  Requesting refill of medication.  Denies any chest pain.  Only gets a 30-day supply provided by her insurance to [facility].    Lipoma of back  Chronic history.  Does develop pain at times below the lipoma.  Does not want the lipoma removed yet.    Left leg pain  Complains of a chronic history of left leg pain.  Going on for at least 3 years.  Denies trauma.  Believes she has arthritis.       Current medicines (including changes today)  Current Outpatient Prescriptions   Medication Sig Dispense Refill   • amLODIPine (NORVASC) 5 MG Tab Take 1 Tab by mouth every day. 30 Tab 11   • lisinopril (PRINIVIL) 20 MG Tab Take 1 Tab by mouth every day. 30 Tab 11   • estradiol (ESTRACE) 1 MG Tab TAKE ONE TABLET BY MOUTH ONE TIME DAILY  30 Tab 3   • Semaglutide (OZEMPIC) 1 MG/DOSE Solution Pen-injector Inject 1 mg as instructed every 7 days. 2 PEN 11   • rosuvastatin (CRESTOR) 40 MG tablet Take 1 Tab by mouth every day. 30 Tab 11   • Multiple Vitamins-Minerals (CENTRUM SILVER PO) Take  by mouth every day.       No current facility-administered medications for this visit.      She  has a past medical history of Diabetes; Hyperlipidemia; and Hypertension.    Social History and Family History were reviewed and  updated.    ROS   No chest pain, no shortness of breath, no abdominal pain and all other systems were reviewed and are negative.       Objective:     /78 (BP Location: Right arm, Patient Position: Sitting, BP Cuff Size: Adult)   Pulse 98   Temp 37 °C (98.6 °F) (Temporal)   Resp 16   Ht 1.524 m (5')   Wt 77.1 kg (170 lb)   SpO2 96%  Body mass index is 33.2 kg/m².   Physical Exam:  Constitutional: Alert, no distress.  Skin: Warm, dry, good turgor, no rashes in visible areas.  Significant lesion on the upper left back which is subcutaneous.  Eye: Equal, round and reactive, conjunctiva clear, lids normal.  ENMT: Lips without lesions, good dentition, oropharynx clear.  Neck: Trachea midline, no masses.   Lymph: No cervical or supraclavicular lymphadenopathy  Respiratory: Unlabored respiratory effort, lungs clear to auscultation, no wheezes, no ronchi.  Cardiovascular: Normal S1, S2, no murmur, no edema.  Psych: Alert and oriented x3, normal affect and mood.    A1c at 7.1.    Assessment and Plan:   The following treatment plan was discussed    1. Uncontrolled type 2 diabetes mellitus with hyperglycemia (HCC)  Chronic condition.  Controlled.  Renewed lisinopril 20 mg.  Continue regimen of diabetic medications.  Continue dietary changes.  Follow with endocrinology in May.  - lisinopril (PRINIVIL) 20 MG Tab; Take 1 Tab by mouth every day.  Dispense: 30 Tab; Refill: 11  - POCT  A1C    2. Essential hypertension  Chronic condition.  Controlled.  Renewed amlodipine and lisinopril.  - amLODIPine (NORVASC) 5 MG Tab; Take 1 Tab by mouth every day.  Dispense: 30 Tab; Refill: 11  - lisinopril (PRINIVIL) 20 MG Tab; Take 1 Tab by mouth every day.  Dispense: 30 Tab; Refill: 11    3. Left leg pain  New condition noted but chronic.  Likely secondary to arthritis.  We will continue to monitor.  May workup in the future.    4. Lipoma of back  Chronic condition.  Possibly symptomatic now.  She refused any referral to Derm to get  the large mass removed.    Patient was seen for 25 minutes face to face of which > 50% of appointment time was spent on counseling and coordination of care regarding the above.    Followup: Return in about 6 months (around 10/18/2019), or if symptoms worsen or fail to improve.    Please note that this dictation was created using voice recognition software. I have made every reasonable attempt to correct obvious errors, but I expect that there are errors of grammar and possibly content that I did not discover before finalizing the note.

## 2019-04-18 NOTE — ASSESSMENT & PLAN NOTE
Complains of a chronic history of left leg pain.  Going on for at least 3 years.  Denies trauma.  Believes she has arthritis.

## 2019-04-18 NOTE — ASSESSMENT & PLAN NOTE
Chronic history.  Takes both amlodipine and lisinopril.  Requesting refill of medication.  Denies any chest pain.  Only gets a 30-day supply provided by her insurance to [facility].

## 2019-04-24 RX ORDER — IBUPROFEN 800 MG/1
TABLET ORAL
Qty: 30 TAB | Refills: 2 | Status: SHIPPED | OUTPATIENT
Start: 2019-04-24 | End: 2020-06-14

## 2019-05-23 ENCOUNTER — OFFICE VISIT (OUTPATIENT)
Dept: ENDOCRINOLOGY | Facility: MEDICAL CENTER | Age: 59
End: 2019-05-23
Payer: COMMERCIAL

## 2019-05-23 VITALS
DIASTOLIC BLOOD PRESSURE: 64 MMHG | BODY MASS INDEX: 32.79 KG/M2 | WEIGHT: 167 LBS | HEIGHT: 60 IN | OXYGEN SATURATION: 97 % | SYSTOLIC BLOOD PRESSURE: 110 MMHG | HEART RATE: 90 BPM

## 2019-05-23 DIAGNOSIS — E11.65 UNCONTROLLED TYPE 2 DIABETES MELLITUS WITH HYPERGLYCEMIA (HCC): ICD-10-CM

## 2019-05-23 DIAGNOSIS — I10 ESSENTIAL HYPERTENSION: ICD-10-CM

## 2019-05-23 PROCEDURE — 99214 OFFICE O/P EST MOD 30 MIN: CPT | Performed by: PHYSICIAN ASSISTANT

## 2019-05-23 NOTE — PROGRESS NOTES
Return to office Patient Consult Note  Referred by: Yaya Frank P.A.-C.    Reason for consult: Diabetes Management Type 2    HPI:  Rosario Blevins is a 58 y.o. old patient who is seeing us today for diabetes care.  This is a pleasant patient with diabetes and I appreciate the opportunity to participate in the care of this patient.    Labs of 4/1/2019 HbA1c is 7.1  Labs of 1/3/19 HbA1c 9.3  Labs of 10/11/18 HbA1c is  9.7, LDL 75, Trigs are 222, microalbumin is negative, GFR >60  Labs of 4/19/18 HbA1c is 8.0  Labs of 5/12/16 HbA1c is 10.3  Labs of 10/7/15 HbA1c is 9.2  Labs of 5/7/15 HbA1c is 12.9  Labs of 10/15/14 HbA1c is 8.1    BG Diary:5/23/2019  In the AM: no log    Weight: today 167 and on 11/8/18 was 177      1. Uncontrolled type 2 diabetes mellitus with hyperglycemia (HCC)  This is a new patient on 11/8/18  She was on:  1.  Metformin 500mg TID  2.  Tradjenta 5mg      STOP:  1.  Metformin 500mg TID  2.  Tradjenta 5mg      Now on:  1.   Ozempic 1.0 once a month on Thursday  3.   Lantus 26 units at night     Off SGLT2       2. Essential hypertension  This is stable today and no new changes are needed or required in today's visit      ROS:   Constitutional: No night sweats.  Eyes:  No visual changes.  Cardiac: No chest pain, No palpitations or racing heart rate.  Resp: No shortness of breath, No cough,   Gi: No Diarrhea    All other systems were reviewed and were/are negative.      Past Medical History:  Patient Active Problem List    Diagnosis Date Noted   • HTN (hypertension) 09/26/2013     Priority: High   • Dyslipidemia 09/26/2013     Priority: Medium   • Uncontrolled type 2 diabetes mellitus with hyperglycemia (HCC) 09/26/2013     Priority: Low   • Left leg pain 04/18/2019   • Lipoma of back 10/18/2018   • History of shingles 10/18/2018   • Obesity (BMI 30-39.9) 02/13/2018   • GERD (gastroesophageal reflux disease) 02/13/2018       Past Surgical History:  Past Surgical History:   Procedure Laterality  Date   • HYSTERECTOMY, TOTAL ABDOMINAL     • US-NEEDLE CORE BX-BREAST PANEL         Allergies:  Milk products food allergy and Codeine    Social History:  Social History     Social History   • Marital status: Single     Spouse name: N/A   • Number of children: N/A   • Years of education: N/A     Occupational History   • Not on file.     Social History Main Topics   • Smoking status: Former Smoker     Types: Cigarettes     Quit date: 2/13/1991   • Smokeless tobacco: Never Used   • Alcohol use No   • Drug use: No   • Sexual activity: No     Other Topics Concern   • Not on file     Social History Narrative   • No narrative on file       Family History:  Family History   Problem Relation Age of Onset   • Other Mother 47        Liver cancer   • Cancer Mother         Liver CA   • Other Sister         6 months   • Other Brother         at birth   • Heart Attack Maternal Uncle 40   • Heart Attack Maternal Grandmother 50   • Heart Attack Maternal Uncle 38   • No Known Problems Father        Medications:    Current Outpatient Prescriptions:   •   MG Tab, take 1 tablet by mouth every 8 hours as needed, Disp: 30 Tab, Rfl: 2  •  amLODIPine (NORVASC) 5 MG Tab, Take 1 Tab by mouth every day., Disp: 30 Tab, Rfl: 11  •  lisinopril (PRINIVIL) 20 MG Tab, Take 1 Tab by mouth every day., Disp: 30 Tab, Rfl: 11  •  estradiol (ESTRACE) 1 MG Tab, TAKE ONE TABLET BY MOUTH ONE TIME DAILY , Disp: 30 Tab, Rfl: 3  •  Semaglutide (OZEMPIC) 1 MG/DOSE Solution Pen-injector, Inject 1 mg as instructed every 7 days., Disp: 2 PEN, Rfl: 11  •  rosuvastatin (CRESTOR) 40 MG tablet, Take 1 Tab by mouth every day., Disp: 30 Tab, Rfl: 11  •  Multiple Vitamins-Minerals (CENTRUM SILVER PO), Take  by mouth every day., Disp: , Rfl:         Physical Examination:   Vital signs: /64 (BP Location: Left arm, Patient Position: Sitting)   Pulse 90   Ht 1.524 m (5')   Wt 75.8 kg (167 lb)   SpO2 97%   BMI 32.61 kg/m²   General: No distress, cooperative,  well dressed and well nourished.   Eyes: No scleral icterus or discharge, No hyposphagma  ENMT: Normal on external inspection of nose, lips, No nasal drainage   Neck: No abnormal masses on inspection  Resp: Normal effort, Bilateral clear to auscultation, No wheezing, No rales  CVS: Regular rate and rhythm, S1 S2 normal, No murmur. No gallop  Extremities: No edema bilateral extremities  Neuro: Alert and oriented  Skin: No rash, No Ulcers  Psych: Normal mood and affect      Assessment and Plan:    1. Uncontrolled type 2 diabetes mellitus with hyperglycemia (HCC)  Now on:  1.   Ozempic 1.0 once a week on Thursday  3.   Lantus 26 units at night    2. Essential hypertension  This is stable today and no new changes are needed or required in today's visit    Return in about 3 months (around 8/23/2019).      Thank you kindly for allowing me to participate in the diabetes care plan for this patient.    Austin Ochoa PA-C, BC-ADM  Board Certified - Advanced Diabetes Management  05/23/19    CC:   Yaya Frank P.A.-C.

## 2019-06-23 RX ORDER — OMEPRAZOLE 40 MG/1
CAPSULE, DELAYED RELEASE ORAL
Qty: 30 CAP | Refills: 4 | Status: SHIPPED | OUTPATIENT
Start: 2019-06-23 | End: 2019-11-22 | Stop reason: SDUPTHER

## 2019-07-19 RX ORDER — ESTRADIOL 1 MG/1
TABLET ORAL
Qty: 30 TAB | Refills: 2 | Status: SHIPPED | OUTPATIENT
Start: 2019-07-19 | End: 2019-10-21 | Stop reason: SDUPTHER

## 2019-08-12 RX ORDER — INSULIN GLARGINE 100 [IU]/ML
INJECTION, SOLUTION SUBCUTANEOUS
Qty: 6 ML | Refills: 1 | Status: SHIPPED | OUTPATIENT
Start: 2019-08-12 | End: 2019-08-29 | Stop reason: SDUPTHER

## 2019-08-12 NOTE — TELEPHONE ENCOUNTER
Was the patient seen in the last year in this department? Yes    Does patient have an active prescription for medications requested? No     Received Request Via: Pharmacy     Last seen 05/23/2019

## 2019-08-29 ENCOUNTER — OFFICE VISIT (OUTPATIENT)
Dept: ENDOCRINOLOGY | Facility: MEDICAL CENTER | Age: 59
End: 2019-08-29
Payer: COMMERCIAL

## 2019-08-29 VITALS
DIASTOLIC BLOOD PRESSURE: 82 MMHG | OXYGEN SATURATION: 98 % | BODY MASS INDEX: 33.18 KG/M2 | WEIGHT: 169 LBS | SYSTOLIC BLOOD PRESSURE: 122 MMHG | HEART RATE: 68 BPM | HEIGHT: 60 IN

## 2019-08-29 DIAGNOSIS — I10 ESSENTIAL HYPERTENSION: ICD-10-CM

## 2019-08-29 DIAGNOSIS — E11.65 UNCONTROLLED TYPE 2 DIABETES MELLITUS WITH HYPERGLYCEMIA (HCC): ICD-10-CM

## 2019-08-29 LAB
HBA1C MFR BLD: 6.7 % (ref 0–5.6)
INT CON NEG: NEGATIVE
INT CON POS: POSITIVE

## 2019-08-29 PROCEDURE — 83036 HEMOGLOBIN GLYCOSYLATED A1C: CPT | Performed by: PHYSICIAN ASSISTANT

## 2019-08-29 PROCEDURE — 99214 OFFICE O/P EST MOD 30 MIN: CPT | Performed by: PHYSICIAN ASSISTANT

## 2019-08-29 NOTE — PROGRESS NOTES
Return to office Patient Consult Note  Referred by: Yaya Frank P.A.-C.    Reason for consult: Diabetes Management Type 2    HPI:  Rosario Blevins is a 58 y.o. old patient who is seeing us today for diabetes care.  This is a pleasant patient with diabetes and I appreciate the opportunity to participate in the care of this patient.    Labs of 8/29/2019 HbA1c is 6.7  Labs of 4/1/2019 HbA1c is 7.1  Labs of 1/3/19 HbA1c 9.3  Labs of 10/11/18 HbA1c is  9.7, LDL 75, Trigs are 222, microalbumin is negative, GFR >60  Labs of 4/19/18 HbA1c is 8.0  Labs of 5/12/16 HbA1c is 10.3  Labs of 10/7/15 HbA1c is 9.2  Labs of 5/7/15 HbA1c is 12.9  Labs of 10/15/14 HbA1c is 8.1    BG Diary:8/29/2019  In the AM:  120, 110, 101, 120, 130, 113, 116    Weight: today 169 and on 11/8/18 was 177    1. Uncontrolled type 2 diabetes mellitus with hyperglycemia (HCC)  This is a new patient on 11/8/18  She was on:  1.  Metformin 500mg TID  2.  Tradjenta 5mg      STOP:  1.  Metformin 500mg TID  2.  Tradjenta 5mg      Now on:  1.   Ozempic 1.0 once a month on Thursday  3.   Lantus 26 units at night     Off SGLT2        2. Essential hypertension  This is stable today and no new changes are needed or required in today's visit           ROS:   Constitutional: No night sweats.  Eyes:  No visual changes.  Cardiac: No chest pain, No palpitations or racing heart rate.  Resp: No shortness of breath, No cough,   Gi: No Diarrhea      All other systems were reviewed and were/are negative.      Past Medical History:  Patient Active Problem List    Diagnosis Date Noted   • HTN (hypertension) 09/26/2013     Priority: High   • Dyslipidemia 09/26/2013     Priority: Medium   • Uncontrolled type 2 diabetes mellitus with hyperglycemia (HCC) 09/26/2013     Priority: Low   • Left leg pain 04/18/2019   • Lipoma of back 10/18/2018   • History of shingles 10/18/2018   • Obesity (BMI 30-39.9) 02/13/2018   • GERD (gastroesophageal reflux disease) 02/13/2018        Past Surgical History:  Past Surgical History:   Procedure Laterality Date   • HYSTERECTOMY, TOTAL ABDOMINAL     • US-NEEDLE CORE BX-BREAST PANEL         Allergies:  Milk products food allergy and Codeine    Social History:  Social History     Socioeconomic History   • Marital status: Single     Spouse name: Not on file   • Number of children: Not on file   • Years of education: Not on file   • Highest education level: Not on file   Occupational History   • Not on file   Social Needs   • Financial resource strain: Not on file   • Food insecurity:     Worry: Not on file     Inability: Not on file   • Transportation needs:     Medical: Not on file     Non-medical: Not on file   Tobacco Use   • Smoking status: Former Smoker     Types: Cigarettes     Last attempt to quit: 1991     Years since quittin.5   • Smokeless tobacco: Never Used   Substance and Sexual Activity   • Alcohol use: No   • Drug use: No   • Sexual activity: Never   Lifestyle   • Physical activity:     Days per week: Not on file     Minutes per session: Not on file   • Stress: Not on file   Relationships   • Social connections:     Talks on phone: Not on file     Gets together: Not on file     Attends [employer] service: Not on file     Active member of club or organization: Not on file     Attends meetings of clubs or organizations: Not on file     Relationship status: Not on file   • Intimate partner violence:     Fear of current or ex partner: Not on file     Emotionally abused: Not on file     Physically abused: Not on file     Forced sexual activity: Not on file   Other Topics Concern   • Not on file   Social History Narrative   • Not on file       Family History:  Family History   Problem Relation Age of Onset   • Other Mother 47        Liver cancer   • Cancer Mother         Liver CA   • Other Sister         6 months   • Other Brother         at birth   • Heart Attack Maternal Uncle 40   • Heart Attack Maternal Grandmother 50   •  Heart Attack Maternal Uncle 38   • No Known Problems Father        Medications:    Current Outpatient Medications:   •  LANTUS SOLOSTAR 100 UNIT/ML Solution Pen-injector injection, INJECT 26 UNITS SUBCUTANEOUSLY AS INSTRUCTED EVERY EVENING , Disp: 6 mL, Rfl: 1  •  estradiol (ESTRACE) 1 MG Tab, TAKE ONE TABLET BY MOUTH ONE TIME DAILY , Disp: 30 Tab, Rfl: 2  •  omeprazole (PRILOSEC) 40 MG delayed-release capsule, take 1 capsule by mouth once daily, Disp: 30 Cap, Rfl: 4  •   MG Tab, take 1 tablet by mouth every 8 hours as needed, Disp: 30 Tab, Rfl: 2  •  amLODIPine (NORVASC) 5 MG Tab, Take 1 Tab by mouth every day., Disp: 30 Tab, Rfl: 11  •  lisinopril (PRINIVIL) 20 MG Tab, Take 1 Tab by mouth every day., Disp: 30 Tab, Rfl: 11  •  Semaglutide (OZEMPIC) 1 MG/DOSE Solution Pen-injector, Inject 1 mg as instructed every 7 days., Disp: 2 PEN, Rfl: 11  •  rosuvastatin (CRESTOR) 40 MG tablet, Take 1 Tab by mouth every day., Disp: 30 Tab, Rfl: 11  •  Multiple Vitamins-Minerals (CENTRUM SILVER PO), Take  by mouth every day., Disp: , Rfl:         Physical Examination:   Vital signs: /82 (BP Location: Left arm, Patient Position: Sitting)   Pulse 68   Ht 1.524 m (5')   Wt 76.7 kg (169 lb)   SpO2 98%   BMI 33.01 kg/m²   General: No distress, cooperative, well dressed and well nourished.   Eyes: No scleral icterus or discharge, No hyposphagma  ENMT: Normal on external inspection of nose, lips, No nasal drainage   Neck: No abnormal masses on inspection  Resp: Normal effort, Bilateral clear to auscultation, No wheezing, No rales  CVS: Regular rate and rhythm, S1 S2 normal, No murmur. No gallop  Extremities: No edema bilateral extremities  Neuro: Alert and oriented  Skin: No rash, No Ulcers  Psych: Normal mood and affect      Assessment and Plan:    1. Uncontrolled type 2 diabetes mellitus with hyperglycemia (HCC)    Now on:  1.   Ozempic 1.0 once a month on Thursday  3.   Lantus 26 units at night     Off SGLT2    2.  Essential hypertension  This is stable today and no new changes are needed or required in today's visit    Return in about 3 months (around 11/29/2019).      Thank you kindly for allowing me to participate in the diabetes care plan for this patient.    Austin Ochoa PA-C, BC-[facility]  Board Certified - Advanced Diabetes Management  08/29/19    CC:   Yaya Frank P.A.-C.

## 2019-10-17 ENCOUNTER — OFFICE VISIT (OUTPATIENT)
Dept: MEDICAL GROUP | Facility: MEDICAL CENTER | Age: 59
End: 2019-10-17
Payer: COMMERCIAL

## 2019-10-17 VITALS
HEIGHT: 60 IN | HEART RATE: 92 BPM | OXYGEN SATURATION: 97 % | DIASTOLIC BLOOD PRESSURE: 72 MMHG | BODY MASS INDEX: 32.98 KG/M2 | TEMPERATURE: 98.3 F | WEIGHT: 168 LBS | SYSTOLIC BLOOD PRESSURE: 126 MMHG

## 2019-10-17 DIAGNOSIS — K21.9 GASTROESOPHAGEAL REFLUX DISEASE, ESOPHAGITIS PRESENCE NOT SPECIFIED: ICD-10-CM

## 2019-10-17 DIAGNOSIS — Z11.59 ENCOUNTER FOR HEPATITIS C SCREENING TEST FOR LOW RISK PATIENT: ICD-10-CM

## 2019-10-17 DIAGNOSIS — Z79.4 CONTROLLED TYPE 2 DIABETES MELLITUS WITHOUT COMPLICATION, WITH LONG-TERM CURRENT USE OF INSULIN (HCC): ICD-10-CM

## 2019-10-17 DIAGNOSIS — Z23 NEED FOR VACCINATION: ICD-10-CM

## 2019-10-17 DIAGNOSIS — E11.9 CONTROLLED TYPE 2 DIABETES MELLITUS WITHOUT COMPLICATION, WITH LONG-TERM CURRENT USE OF INSULIN (HCC): ICD-10-CM

## 2019-10-17 PROCEDURE — 90686 IIV4 VACC NO PRSV 0.5 ML IM: CPT | Performed by: PHYSICIAN ASSISTANT

## 2019-10-17 PROCEDURE — 99214 OFFICE O/P EST MOD 30 MIN: CPT | Mod: 25 | Performed by: PHYSICIAN ASSISTANT

## 2019-10-17 PROCEDURE — 90471 IMMUNIZATION ADMIN: CPT | Performed by: PHYSICIAN ASSISTANT

## 2019-10-17 NOTE — ASSESSMENT & PLAN NOTE
This is a 58-year-old female who is here today to discuss a few concerns.  She is doing really well with her diabetes.  Recent A1c at 6.7.  Insulin and Ozempic weekly.  Ozempic causes significant fatigue for the first couple days.  She is due for labs.

## 2019-10-17 NOTE — ASSESSMENT & PLAN NOTE
Chronic condition.  Heartburn has improved a lot.  She has been changing her diet.  Takes omeprazole maybe once or twice a week.

## 2019-10-17 NOTE — PROGRESS NOTES
Subjective:   Rosario Blevins is a 58 y.o. female here today for diabetes, GERD and flu shot.    Controlled type 2 diabetes mellitus without complication, with long-term current use of insulin ([facility])  This is a 58-year-old female who is here today to discuss a few concerns.  She is doing really well with her diabetes.  Recent A1c at 6.7.  Insulin and Ozempic weekly.  Ozempic causes significant fatigue for the first couple days.  She is due for labs.    GERD (gastroesophageal reflux disease)  Chronic condition.  Heartburn has improved a lot.  She has been changing her diet.  Takes omeprazole maybe once or twice a week.       Current medicines (including changes today)  Current Outpatient Medications   Medication Sig Dispense Refill   • Semaglutide (OZEMPIC) 1 MG/DOSE Solution Pen-injector Inject 1 mg as instructed every 7 days. 2 PEN 11   • insulin glargine (LANTUS SOLOSTAR) 100 UNIT/ML Solution Pen-injector injection INJECT 26 UNITS SUBCUTANEOUSLY AS INSTRUCTED EVERY EVENING 5 PEN 9   • estradiol (ESTRACE) 1 MG Tab TAKE ONE TABLET BY MOUTH ONE TIME DAILY  30 Tab 2   • omeprazole (PRILOSEC) 40 MG delayed-release capsule take 1 capsule by mouth once daily 30 Cap 4   •  MG Tab take 1 tablet by mouth every 8 hours as needed 30 Tab 2   • amLODIPine (NORVASC) 5 MG Tab Take 1 Tab by mouth every day. 30 Tab 11   • lisinopril (PRINIVIL) 20 MG Tab Take 1 Tab by mouth every day. 30 Tab 11   • rosuvastatin (CRESTOR) 40 MG tablet Take 1 Tab by mouth every day. 30 Tab 11   • Multiple Vitamins-Minerals (CENTRUM SILVER PO) Take  by mouth every day.       No current facility-administered medications for this visit.      She  has a past medical history of Diabetes, Hyperlipidemia, and Hypertension.    Social History and Family History were reviewed and updated.    ROS   No chest pain, no shortness of breath, no abdominal pain and all other systems were reviewed and are negative.       Objective:     /72 (BP Location:  Right arm, Patient Position: Sitting, BP Cuff Size: Adult)   Pulse 92   Temp 36.8 °C (98.3 °F) (Temporal)   Ht 1.524 m (5')   Wt 76.2 kg (168 lb)   SpO2 97%  Body mass index is 32.81 kg/m².   Physical Exam:  Constitutional: Alert, no distress.  Skin: Warm, dry, good turgor, no rashes in visible areas.  Eye: Equal, round and reactive, conjunctiva clear, lids normal.  ENMT: Lips without lesions, good dentition, oropharynx clear.  Neck: Trachea midline, no masses.   Lymph: No cervical or supraclavicular lymphadenopathy  Respiratory: Unlabored respiratory effort, lungs clear to auscultation, no wheezes, no ronchi.  Cardiovascular: Normal S1, S2, no murmur, no edema.  Psych: Alert and oriented x3, normal affect and mood.        Assessment and Plan:   The following treatment plan was discussed    1. Controlled type 2 diabetes mellitus without complication, with long-term current use of insulin (HCC)  Chronic condition.  Controlled.  Continue medications and following up with endocrinology.  Ordered labs fasting.  She will be contacted with the results.  - Lipid Profile; Future  - MICROALBUMIN CREAT RATIO URINE (LAB COLLECT); Future  - Comp Metabolic Panel; Future    2. Gastroesophageal reflux disease, esophagitis presence not specified  Chronic condition.  Improvement with dietary changes.  Continue omeprazole as directed if needed.    3. Need for vaccination  Administered without complaints peer  - Influenza Vaccine Quad Injection (PF)    4. Encounter for hepatitis C screening test for low risk patient  Hepatitis C viral antibody ordered.  Low risk.  - HEP C VIRUS ANTIBODY; Future      Followup: Return in about 6 months (around 4/17/2020), or if symptoms worsen or fail to improve.    Please note that this dictation was created using voice recognition software. I have made every reasonable attempt to correct obvious errors, but I expect that there are errors of grammar and possibly content that I did not discover  before finalizing the note.

## 2019-10-21 RX ORDER — ESTRADIOL 1 MG/1
TABLET ORAL
Qty: 30 TAB | Refills: 0 | Status: SHIPPED | OUTPATIENT
Start: 2019-10-21 | End: 2019-11-21 | Stop reason: SDUPTHER

## 2019-10-21 NOTE — TELEPHONE ENCOUNTER
Was the patient seen in the last year in this department? Yes lov 10/17/19    Does patient have an active prescription for medications requested? No     Received Request Via: Pharmacy

## 2019-10-30 ENCOUNTER — TELEPHONE (OUTPATIENT)
Dept: ENDOCRINOLOGY | Facility: MEDICAL CENTER | Age: 59
End: 2019-10-30

## 2019-10-30 NOTE — TELEPHONE ENCOUNTER
DOCUMENTATION OF PAR STATUS:    1. Name of Medication & Dose: Ozempic 2mg/1.5ml     2. Name of Prescription Coverage Company & phone #: [email or web address]    3. Date Prior Auth Submitted: 10/30/19    4. What information was given to obtain insurance decision? Clinical notes/labs    5. Prior Auth Status? Pending- reference #[id number]    6. Patient Notified: yes    Scanned into media

## 2019-11-22 RX ORDER — ESTRADIOL 1 MG/1
TABLET ORAL
Qty: 30 TAB | Refills: 0 | Status: SHIPPED | OUTPATIENT
Start: 2019-11-22 | End: 2019-12-19

## 2019-11-22 RX ORDER — OMEPRAZOLE 40 MG/1
CAPSULE, DELAYED RELEASE ORAL
Qty: 30 CAP | Refills: 3 | Status: SHIPPED | OUTPATIENT
Start: 2019-11-22 | End: 2020-03-23

## 2019-12-05 ENCOUNTER — OFFICE VISIT (OUTPATIENT)
Dept: ENDOCRINOLOGY | Facility: MEDICAL CENTER | Age: 59
End: 2019-12-05
Payer: COMMERCIAL

## 2019-12-05 VITALS
OXYGEN SATURATION: 95 % | SYSTOLIC BLOOD PRESSURE: 120 MMHG | HEIGHT: 60 IN | HEART RATE: 96 BPM | WEIGHT: 166 LBS | DIASTOLIC BLOOD PRESSURE: 76 MMHG | BODY MASS INDEX: 32.59 KG/M2

## 2019-12-05 DIAGNOSIS — E11.9 CONTROLLED TYPE 2 DIABETES MELLITUS WITHOUT COMPLICATION, WITH LONG-TERM CURRENT USE OF INSULIN (HCC): ICD-10-CM

## 2019-12-05 DIAGNOSIS — Z79.4 CONTROLLED TYPE 2 DIABETES MELLITUS WITHOUT COMPLICATION, WITH LONG-TERM CURRENT USE OF INSULIN (HCC): ICD-10-CM

## 2019-12-05 DIAGNOSIS — I10 ESSENTIAL HYPERTENSION: ICD-10-CM

## 2019-12-05 DIAGNOSIS — E11.65 UNCONTROLLED TYPE 2 DIABETES MELLITUS WITH HYPERGLYCEMIA (HCC): ICD-10-CM

## 2019-12-05 LAB
HBA1C MFR BLD: 6.2 % (ref 0–5.6)
INT CON NEG: NEGATIVE
INT CON POS: POSITIVE

## 2019-12-05 PROCEDURE — 83036 HEMOGLOBIN GLYCOSYLATED A1C: CPT | Performed by: PHYSICIAN ASSISTANT

## 2019-12-05 PROCEDURE — 99214 OFFICE O/P EST MOD 30 MIN: CPT | Performed by: PHYSICIAN ASSISTANT

## 2019-12-05 NOTE — PROGRESS NOTES
Return to office Patient Consult Note  Referred by: Yaya Frank P.A.-C.    Reason for consult: Diabetes Management Type 2    HPI:  Rosario Blevins is a 58 y.o. old patient who is seeing us today for diabetes care.  This is a pleasant patient with diabetes and I appreciate the opportunity to participate in the care of this patient.    Labs of 12/5/2019 HbA1c is 6.2  Labs of 8/29/2019 HbA1c is 6.7  Labs of 4/1/2019 HbA1c is 7.1  Labs of 1/3/19 HbA1c 9.3  Labs of 10/11/18 HbA1c is  9.7, LDL 75, Trigs are 222, microalbumin is negative, GFR >60  Labs of 4/19/18 HbA1c is 8.0  Labs of 5/12/16 HbA1c is 10.3  Labs of 10/7/15 HbA1c is 9.2  Labs of 5/7/15 HbA1c is 12.9  Labs of 10/15/14 HbA1c is 8.1    BG Diary:12/5/2019  In the AM: no log      1. Uncontrolled type 2 diabetes mellitus with hyperglycemia (HCC)  This is a new patient on 11/8/18  She was on:  1.  Metformin 500mg TID  2.  Tradjenta 5mg        Now on:  1.   Ozempic 1.0 once a month on Thursday  3.   Lantus 26 units at night     Off SGLT2        2. Essential hypertension  This is stable today and no new changes are needed or required in today's visit        ROS:   Constitutional: No night sweats.  Eyes:  No visual changes.  Cardiac: No chest pain, No palpitations or racing heart rate.  Resp: No shortness of breath, No cough,   Gi: No Diarrhea    All other systems were reviewed and were/are negative.      Past Medical History:  Patient Active Problem List    Diagnosis Date Noted   • HTN (hypertension) 09/26/2013     Priority: High   • Dyslipidemia 09/26/2013     Priority: Medium   • Controlled type 2 diabetes mellitus without complication, with long-term current use of insulin (HCC) 09/26/2013     Priority: Low   • Left leg pain 04/18/2019   • Lipoma of back 10/18/2018   • History of shingles 10/18/2018   • Obesity (BMI 30-39.9) 02/13/2018   • GERD (gastroesophageal reflux disease) 02/13/2018       Past Surgical History:  Past Surgical History:   Procedure  Laterality Date   • HYSTERECTOMY, TOTAL ABDOMINAL     • US-NEEDLE CORE BX-BREAST PANEL         Allergies:  Milk products food allergy and Codeine    Social History:  Social History     Socioeconomic History   • Marital status: Single     Spouse name: Not on file   • Number of children: Not on file   • Years of education: Not on file   • Highest education level: Not on file   Occupational History   • Not on file   Social Needs   • Financial resource strain: Not on file   • Food insecurity:     Worry: Not on file     Inability: Not on file   • Transportation needs:     Medical: Not on file     Non-medical: Not on file   Tobacco Use   • Smoking status: Former Smoker     Types: Cigarettes     Last attempt to quit: 1991     Years since quittin.8   • Smokeless tobacco: Never Used   Substance and Sexual Activity   • Alcohol use: No   • Drug use: No   • Sexual activity: Not on file     Comment: ,17-year male , no children   Lifestyle   • Physical activity:     Days per week: Not on file     Minutes per session: Not on file   • Stress: Not on file   Relationships   • Social connections:     Talks on phone: Not on file     Gets together: Not on file     Attends Anabaptist service: Not on file     Active member of club or organization: Not on file     Attends meetings of clubs or organizations: Not on file     Relationship status: Not on file   • Intimate partner violence:     Fear of current or ex partner: Not on file     Emotionally abused: Not on file     Physically abused: Not on file     Forced sexual activity: Not on file   Other Topics Concern   • Not on file   Social History Narrative    Works at [employer]       Family History:  Family History   Problem Relation Age of Onset   • Other Mother 47        Liver cancer   • Cancer Mother         Liver CA   • Other Sister         6 months   • Other Brother         at birth   • Heart Attack Maternal Uncle 40   • Heart Attack Maternal Grandmother  50   • Heart Attack Maternal Uncle 38   • No Known Problems Father        Medications:    Current Outpatient Medications:   •  estradiol (ESTRACE) 1 MG Tab, TAKE 1 TABLET BY MOUTH ONCE DAILY, Disp: 30 Tab, Rfl: 0  •  omeprazole (PRILOSEC) 40 MG delayed-release capsule, TAKE ONE CAPSULE BY MOUTH ONE TIME DAILY , Disp: 30 Cap, Rfl: 3  •  Semaglutide (OZEMPIC) 1 MG/DOSE Solution Pen-injector, Inject 1 mg as instructed every 7 days., Disp: 2 PEN, Rfl: 11  •  insulin glargine (LANTUS SOLOSTAR) 100 UNIT/ML Solution Pen-injector injection, INJECT 26 UNITS SUBCUTANEOUSLY AS INSTRUCTED EVERY EVENING, Disp: 5 PEN, Rfl: 9  •   MG Tab, take 1 tablet by mouth every 8 hours as needed, Disp: 30 Tab, Rfl: 2  •  amLODIPine (NORVASC) 5 MG Tab, Take 1 Tab by mouth every day., Disp: 30 Tab, Rfl: 11  •  lisinopril (PRINIVIL) 20 MG Tab, Take 1 Tab by mouth every day., Disp: 30 Tab, Rfl: 11  •  rosuvastatin (CRESTOR) 40 MG tablet, Take 1 Tab by mouth every day., Disp: 30 Tab, Rfl: 11  •  Multiple Vitamins-Minerals (CENTRUM SILVER PO), Take  by mouth every day., Disp: , Rfl:         Physical Examination:   Vital signs: /76 (BP Location: Left arm, Patient Position: Sitting)   Pulse 96   Ht 1.524 m (5')   Wt 75.3 kg (166 lb)   SpO2 95%   BMI 32.42 kg/m²   General: No distress, cooperative, well dressed and well nourished.   Eyes: No scleral icterus or discharge, No hyposphagma  ENMT: Normal on external inspection of nose, lips, No nasal drainage   Neck: No abnormal masses on inspection  Resp: Normal effort, Bilateral clear to auscultation, No wheezing, No rales  CVS: Regular rate and rhythm, S1 S2 normal, No murmur. No gallop  Extremities: No edema bilateral extremities  Neuro: Alert and oriented  Skin: No rash, No Ulcers  Psych: Normal mood and affect      Assessment and Plan:    1. Uncontrolled type 2 diabetes mellitus with hyperglycemia (HCC)  Now on:  1.   Ozempic 1.0 once a month on Thursday  3.   Lantus 26 units at  night      3. Essential hypertension  This is stable today and no new changes are needed or required in today's visit    Return in about 3 months (around 3/5/2020).    Thank you kindly for allowing me to participate in the diabetes care plan for this patient.    Austin Ochoa PA-C, BC-[facility]  Board Certified - Advanced Diabetes Management  12/05/19    CC:   Yaya Frank P.A.-C.

## 2019-12-19 RX ORDER — ESTRADIOL 1 MG/1
TABLET ORAL
Qty: 30 TAB | Refills: 0 | Status: SHIPPED | OUTPATIENT
Start: 2019-12-19 | End: 2020-01-19

## 2020-01-28 DIAGNOSIS — E78.5 DYSLIPIDEMIA: ICD-10-CM

## 2020-01-29 RX ORDER — ROSUVASTATIN CALCIUM 40 MG/1
40 TABLET, COATED ORAL DAILY
Qty: 30 TAB | Refills: 11 | Status: SHIPPED | OUTPATIENT
Start: 2020-01-29 | End: 2021-01-31

## 2020-01-29 RX ORDER — ROSUVASTATIN CALCIUM 40 MG/1
TABLET, COATED ORAL
Qty: 30 TAB | Refills: 10 | Status: SHIPPED
Start: 2020-01-29 | End: 2020-05-28

## 2020-01-29 NOTE — TELEPHONE ENCOUNTER
Received request via: Pharmacy    Was the patient seen in the last year in this department? Yes

## 2020-01-29 NOTE — TELEPHONE ENCOUNTER
Received request via: Pharmacy    Was the patient seen in the last year in this department? Yes    Does the patient have an active prescription (recently filled or refills available) for medication(s) requested? Yes

## 2020-03-23 RX ORDER — ESTRADIOL 1 MG/1
TABLET ORAL
Qty: 30 TAB | Refills: 0 | Status: SHIPPED | OUTPATIENT
Start: 2020-03-23 | End: 2020-04-08 | Stop reason: SDUPTHER

## 2020-03-23 RX ORDER — OMEPRAZOLE 40 MG/1
CAPSULE, DELAYED RELEASE ORAL
Qty: 30 CAP | Refills: 2 | Status: SHIPPED | OUTPATIENT
Start: 2020-03-23 | End: 2020-06-14

## 2020-05-28 ENCOUNTER — HOSPITAL ENCOUNTER (OUTPATIENT)
Dept: LAB | Facility: MEDICAL CENTER | Age: 60
End: 2020-05-28
Attending: PHYSICIAN ASSISTANT
Payer: COMMERCIAL

## 2020-05-28 ENCOUNTER — OFFICE VISIT (OUTPATIENT)
Dept: MEDICAL GROUP | Facility: MEDICAL CENTER | Age: 60
End: 2020-05-28
Payer: COMMERCIAL

## 2020-05-28 VITALS
WEIGHT: 173 LBS | BODY MASS INDEX: 33.96 KG/M2 | HEIGHT: 60 IN | DIASTOLIC BLOOD PRESSURE: 84 MMHG | RESPIRATION RATE: 16 BRPM | HEART RATE: 98 BPM | TEMPERATURE: 97.7 F | SYSTOLIC BLOOD PRESSURE: 134 MMHG | OXYGEN SATURATION: 97 %

## 2020-05-28 DIAGNOSIS — E11.9 CONTROLLED TYPE 2 DIABETES MELLITUS WITHOUT COMPLICATION, WITH LONG-TERM CURRENT USE OF INSULIN (HCC): ICD-10-CM

## 2020-05-28 DIAGNOSIS — Z11.59 ENCOUNTER FOR HEPATITIS C SCREENING TEST FOR LOW RISK PATIENT: ICD-10-CM

## 2020-05-28 DIAGNOSIS — K21.9 GASTROESOPHAGEAL REFLUX DISEASE, ESOPHAGITIS PRESENCE NOT SPECIFIED: ICD-10-CM

## 2020-05-28 DIAGNOSIS — Z79.4 CONTROLLED TYPE 2 DIABETES MELLITUS WITHOUT COMPLICATION, WITH LONG-TERM CURRENT USE OF INSULIN (HCC): ICD-10-CM

## 2020-05-28 DIAGNOSIS — Z12.31 ENCOUNTER FOR SCREENING MAMMOGRAM FOR BREAST CANCER: ICD-10-CM

## 2020-05-28 DIAGNOSIS — E78.5 DYSLIPIDEMIA: ICD-10-CM

## 2020-05-28 LAB
ALBUMIN SERPL BCP-MCNC: 4.4 G/DL (ref 3.2–4.9)
ALBUMIN/GLOB SERPL: 1.3 G/DL
ALP SERPL-CCNC: 51 U/L (ref 30–99)
ALT SERPL-CCNC: 58 U/L (ref 2–50)
ANION GAP SERPL CALC-SCNC: 14 MMOL/L (ref 7–16)
AST SERPL-CCNC: 42 U/L (ref 12–45)
BILIRUB SERPL-MCNC: 0.8 MG/DL (ref 0.1–1.5)
BUN SERPL-MCNC: 16 MG/DL (ref 8–22)
CALCIUM SERPL-MCNC: 9.6 MG/DL (ref 8.5–10.5)
CHLORIDE SERPL-SCNC: 96 MMOL/L (ref 96–112)
CHOLEST SERPL-MCNC: 218 MG/DL (ref 100–199)
CO2 SERPL-SCNC: 26 MMOL/L (ref 20–33)
CREAT SERPL-MCNC: 0.48 MG/DL (ref 0.5–1.4)
CREAT UR-MCNC: 35.69 MG/DL
EST. AVERAGE GLUCOSE BLD GHB EST-MCNC: 160 MG/DL
FASTING STATUS PATIENT QL REPORTED: NORMAL
GLOBULIN SER CALC-MCNC: 3.5 G/DL (ref 1.9–3.5)
GLUCOSE SERPL-MCNC: 135 MG/DL (ref 65–99)
HBA1C MFR BLD: 7.2 % (ref 0–5.6)
HCV AB SER QL: NORMAL
HDLC SERPL-MCNC: 42 MG/DL
LDLC SERPL CALC-MCNC: 121 MG/DL
MICROALBUMIN UR-MCNC: <1.2 MG/DL
MICROALBUMIN/CREAT UR: NORMAL MG/G (ref 0–30)
POTASSIUM SERPL-SCNC: 4.3 MMOL/L (ref 3.6–5.5)
PROT SERPL-MCNC: 7.9 G/DL (ref 6–8.2)
SODIUM SERPL-SCNC: 136 MMOL/L (ref 135–145)
TRIGL SERPL-MCNC: 276 MG/DL (ref 0–149)

## 2020-05-28 PROCEDURE — 82570 ASSAY OF URINE CREATININE: CPT

## 2020-05-28 PROCEDURE — 80053 COMPREHEN METABOLIC PANEL: CPT

## 2020-05-28 PROCEDURE — 99214 OFFICE O/P EST MOD 30 MIN: CPT | Performed by: PHYSICIAN ASSISTANT

## 2020-05-28 PROCEDURE — 83036 HEMOGLOBIN GLYCOSYLATED A1C: CPT

## 2020-05-28 PROCEDURE — 36415 COLL VENOUS BLD VENIPUNCTURE: CPT

## 2020-05-28 PROCEDURE — 86803 HEPATITIS C AB TEST: CPT

## 2020-05-28 PROCEDURE — 82043 UR ALBUMIN QUANTITATIVE: CPT

## 2020-05-28 PROCEDURE — 80061 LIPID PANEL: CPT

## 2020-05-28 ASSESSMENT — PATIENT HEALTH QUESTIONNAIRE - PHQ9: CLINICAL INTERPRETATION OF PHQ2 SCORE: 0

## 2020-05-28 NOTE — PROGRESS NOTES
Subjective:   Rosario Blevins is a 59 y.o. female here today for diabetes, dyslipidemia, GERD and preventative health care.    Controlled type 2 diabetes mellitus without complication, with long-term current use of insulin (HCC)  This is a 59-year-old female here today to discuss her health.  Chronic history of diabetes.  Last A1c at 6.2.  Medication is effective and she does not need any refills today.  She is apprehensive about following up with a new endocrinologist.  Appointment at the end of June.  Needs to get her eye screening appointment.  Needs labs updated.  Has been off of work as she works at [employer].  Plans to go back to work next week.  Will get tested.    Dyslipidemia  Chronic condition.  Takes Crestor 40 mg daily.  No refill needed today.  Needs labs.    GERD (gastroesophageal reflux disease)  Chronic condition.  Stable.  Takes omeprazole daily.  No refill needed today.       Current medicines (including changes today)  Current Outpatient Medications   Medication Sig Dispense Refill   • amLODIPine (NORVASC) 5 MG Tab TAKE ONE TABLET BY MOUTH ONE TIME DAILY  30 Tab 2   • lisinopril (PRINIVIL) 20 MG Tab Take 1 Tab by mouth every day. 30 Tab 11   • estradiol (ESTRACE) 1 MG Tab Take 1 Tab by mouth every day. 30 Tab 3   • omeprazole (PRILOSEC) 40 MG delayed-release capsule TAKE ONE CAPSULE BY MOUTH ONE TIME DAILY  30 Cap 2   • rosuvastatin (CRESTOR) 40 MG tablet Take 1 Tab by mouth every day. 30 Tab 11   • Semaglutide (OZEMPIC) 1 MG/DOSE Solution Pen-injector Inject 1 mg as instructed every 7 days. 2 PEN 11   • insulin glargine (LANTUS SOLOSTAR) 100 UNIT/ML Solution Pen-injector injection INJECT 26 UNITS SUBCUTANEOUSLY AS INSTRUCTED EVERY EVENING 5 PEN 9   •  MG Tab take 1 tablet by mouth every 8 hours as needed 30 Tab 2   • Multiple Vitamins-Minerals (CENTRUM SILVER PO) Take  by mouth every day.       No current facility-administered medications for this visit.      She  has a past  medical history of Diabetes, Hyperlipidemia, and Hypertension.    Social History and Family History were reviewed and updated.    ROS   No chest pain, no shortness of breath, no abdominal pain and all other systems were reviewed and are negative.       Objective:     /84   Pulse 98   Temp 36.5 °C (97.7 °F) (Temporal)   Resp 16   Ht 1.524 m (5')   Wt 78.5 kg (173 lb)   SpO2 97%  Body mass index is 33.79 kg/m².   Physical Exam:  Constitutional: Alert, no distress.  Skin: Warm, dry, good turgor, no rashes in visible areas.  Eye: Equal, round and reactive, conjunctiva clear, lids normal.  ENMT: Lips without lesions, good dentition, oropharynx clear.  Neck: Trachea midline, no masses.   Respiratory: Unlabored respiratory effort, lungs appear clear, no wheezes.  Cardiovascular: Regular rate and rhythm.  Psych: Alert and oriented x3, normal affect and mood.        Assessment and Plan:   The following treatment plan was discussed    1. Controlled type 2 diabetes mellitus without complication, with long-term current use of insulin (HCC)  Chronic condition.  Status unknown.  Obtain labs.  Ordered.  Continue medications as directed.  Advised I believe that she will like Dr. Krishna.   - Comp Metabolic Panel; Future  - HEMOGLOBIN A1C; Future  - Lipid Profile; Future  - MICROALBUMIN CREAT RATIO URINE (LAB COLLECT); Future    2. Dyslipidemia  Chronic condition.  Stable.  Order labs.  Continue Crestor.    3. Gastroesophageal reflux disease, esophagitis presence not specified  Chronic condition.  Stable.  Continue omeprazole.    4. Encounter for screening mammogram for breast cancer  Order mammogram.  Routine screening.  - MA-SCREENING MAMMO BILAT W/TOMOSYNTHESIS W/CAD; Future    5. Encounter for hepatitis C screening test for low risk patient  Hep C viral antibody ordered.  Low risk.  - HEP C VIRUS ANTIBODY; Future      Followup: Return in about 6 months (around 11/28/2020), or if symptoms worsen or fail to  improve.    Please note that this dictation was created using voice recognition software. I have made every reasonable attempt to correct obvious errors, but I expect that there are errors of grammar and possibly content that I did not discover before finalizing the note.

## 2020-05-28 NOTE — ASSESSMENT & PLAN NOTE
This is a 59-year-old female here today to discuss her health.  Chronic history of diabetes.  Last A1c at 6.2.  Medication is effective and she does not need any refills today.  She is apprehensive about following up with a new endocrinologist.  Appointment at the end of June.  Needs to get her eye screening appointment.  Needs labs updated.  Has been off of work as she works at [employer].  Plans to go back to work next week.  Will get tested.

## 2020-05-29 ENCOUNTER — HOSPITAL ENCOUNTER (OUTPATIENT)
Facility: MEDICAL CENTER | Age: 60
End: 2020-05-29
Payer: COMMERCIAL

## 2020-06-02 LAB
SARS-COV-2 RNA SPEC QL NAA+PROBE: NOT DETECTED
SPECIMEN SOURCE: NORMAL

## 2020-06-10 ENCOUNTER — HOSPITAL ENCOUNTER (OUTPATIENT)
Dept: RADIOLOGY | Facility: MEDICAL CENTER | Age: 60
End: 2020-06-10
Attending: PHYSICIAN ASSISTANT
Payer: COMMERCIAL

## 2020-06-10 DIAGNOSIS — Z12.31 ENCOUNTER FOR SCREENING MAMMOGRAM FOR BREAST CANCER: ICD-10-CM

## 2020-06-10 PROCEDURE — 77067 SCR MAMMO BI INCL CAD: CPT

## 2020-06-14 RX ORDER — IBUPROFEN 800 MG/1
TABLET ORAL
Qty: 30 TAB | Refills: 0 | Status: SHIPPED | OUTPATIENT
Start: 2020-06-14 | End: 2020-10-08 | Stop reason: SDUPTHER

## 2020-06-14 RX ORDER — OMEPRAZOLE 40 MG/1
CAPSULE, DELAYED RELEASE ORAL
Qty: 30 CAP | Refills: 0 | Status: SHIPPED | OUTPATIENT
Start: 2020-06-14 | End: 2020-07-20

## 2020-06-24 ENCOUNTER — OFFICE VISIT (OUTPATIENT)
Dept: ENDOCRINOLOGY | Facility: MEDICAL CENTER | Age: 60
End: 2020-06-24
Payer: COMMERCIAL

## 2020-06-24 VITALS
HEIGHT: 60 IN | WEIGHT: 173.4 LBS | SYSTOLIC BLOOD PRESSURE: 128 MMHG | HEART RATE: 102 BPM | OXYGEN SATURATION: 98 % | DIASTOLIC BLOOD PRESSURE: 74 MMHG | BODY MASS INDEX: 34.04 KG/M2

## 2020-06-24 DIAGNOSIS — Z79.4 LONG-TERM INSULIN USE (HCC): ICD-10-CM

## 2020-06-24 DIAGNOSIS — E78.5 DYSLIPIDEMIA: ICD-10-CM

## 2020-06-24 DIAGNOSIS — E11.9 CONTROLLED TYPE 2 DIABETES MELLITUS WITHOUT COMPLICATION, WITH LONG-TERM CURRENT USE OF INSULIN (HCC): ICD-10-CM

## 2020-06-24 DIAGNOSIS — Z79.4 CONTROLLED TYPE 2 DIABETES MELLITUS WITHOUT COMPLICATION, WITH LONG-TERM CURRENT USE OF INSULIN (HCC): ICD-10-CM

## 2020-06-24 PROBLEM — Z79.84 LONG TERM (CURRENT) USE OF ORAL HYPOGLYCEMIC DRUGS: Status: ACTIVE | Noted: 2020-06-24

## 2020-06-24 PROCEDURE — 99214 OFFICE O/P EST MOD 30 MIN: CPT | Performed by: INTERNAL MEDICINE

## 2020-06-24 NOTE — PROGRESS NOTES
CHIEF COMPLAINT: Patient is here for follow up of Type 2 Diabetes Mellitus    HPI:     Rosario Blevins is a 59 y.o. female with Type 2 Diabetes Mellitus here for follow up.    Labs from 5/28/2020 HbA1c is 7.2%    She is taking Lantus 26u daily, and Ozempic 1.0 mg once a week  She reports that she cannot tolerate metformin because of diarrhea  She cannot tolerate Farxiga because it made her feel sick    She feels woozy when her fasting Bg is close to 100 and she cant tolerate the pseudohypoglycemic symptoms    She denies severe hyperglycemia  She denies hypoglycemia  She admits to noncompliance with her diet during COVID-19    She has no history of CAD  She has hyperlipidemia and is on generic Crestor 40mg daily    She has essential hypertension is taking lisinopril  She is tolerating her medication fairly well    BG Diary:06/24/20  Breakfast: 120, 128, 126    Weight has been stable    Diabetes Complications   Retinopathy: No known retinopathy.  Last eye exam: We are requesting records of her eye exam  Neuropathy: Denies paresthesias or numbness in hands or feet. Denies any foot wounds.  Exercise: Minimal.  Diet: Fair.  Patient's medications, allergies, and social histories were reviewed and updated as appropriate.    ROS:     CONS:     No fever, no chills   EYES:     No diplopia, no blurry vision   CV:           No chest pain, no palpitations   PULM:     No SOB, no cough, no hemoptysis.   GI:            No nausea, no vomiting, no diarrhea, no constipation   ENDO:     No polyuria, no polydipsia, no heat intolerance, no cold intolerance       Past Medical History:  Problem List:  2019-04: Left leg pain  2019-01: Influenza vaccination given  2018-10: Lipoma of back  2018-10: History of shingles  2018-02: Obesity (BMI 30-39.9)  2018-02: GERD (gastroesophageal reflux disease)  2013-09: Controlled type 2 diabetes mellitus without complication,   with long-term current use of insulin ([facility])  2013-09: HTN  (hypertension)  2013: Dyslipidemia      Past Surgical History:  Past Surgical History:   Procedure Laterality Date   • HYSTERECTOMY, TOTAL ABDOMINAL     • US-NEEDLE CORE BX-BREAST PANEL          Allergies:  Milk products food allergy and Codeine     Social History:  Social History     Tobacco Use   • Smoking status: Former Smoker     Types: Cigarettes     Last attempt to quit: 1991     Years since quittin.3   • Smokeless tobacco: Never Used   Substance Use Topics   • Alcohol use: No   • Drug use: No        Family History:   family history includes Cancer in her mother; Heart Attack (age of onset: 38) in her maternal uncle; Heart Attack (age of onset: 40) in her maternal uncle; Heart Attack (age of onset: 50) in her maternal grandmother; No Known Problems in her father; Other in her brother and sister; Other (age of onset: 47) in her mother.      PHYSICAL EXAM:   OBJECTIVE:  Vital signs: /74 (BP Location: Left arm, Patient Position: Sitting, BP Cuff Size: Adult)   Pulse (!) 102   Ht 1.524 m (5')   Wt 78.7 kg (173 lb 6.4 oz)   SpO2 98%   BMI 33.86 kg/m²   GENERAL: Well-developed, well-nourished in no apparent distress.   EYE:  No ocular asymmetry, PERRLA  HENT: Pink, moist mucous membranes.    NECK: No thyromegaly.   CARDIOVASCULAR:  No murmurs  LUNGS: Clear breath sounds  ABDOMEN: Soft, nontender   EXTREMITIES: No clubbing, cyanosis, or edema.   NEUROLOGICAL: No gross focal motor abnormalities   LYMPH: No cervical adenopathy palpated.   SKIN: No rashes, lesions.   Monofilament testing with a 10 gram force: sensation: intact bilaterally  Visual Inspection: Feet without maceration, ulcers, or fissures.  Pedal pulses: intact bilaterally    Labs:  Lab Results   Component Value Date/Time    HBA1C 7.2 (H) 2020 10:01 AM        Lab Results   Component Value Date/Time    WBC 6.6 2016 06:49 AM    RBC 5.17 2016 06:49 AM    HEMOGLOBIN 14.8 2016 06:49 AM    MCV 87.2 2016 06:49  AM    MCH 28.6 05/12/2016 06:49 AM    MCHC 32.8 (L) 05/12/2016 06:49 AM    RDW 41.7 05/12/2016 06:49 AM    MPV 11.0 05/12/2016 06:49 AM       Lab Results   Component Value Date/Time    SODIUM 136 05/28/2020 10:01 AM    POTASSIUM 4.3 05/28/2020 10:01 AM    CHLORIDE 96 05/28/2020 10:01 AM    CO2 26 05/28/2020 10:01 AM    ANION 14.0 05/28/2020 10:01 AM    GLUCOSE 135 (H) 05/28/2020 10:01 AM    BUN 16 05/28/2020 10:01 AM    CREATININE 0.48 (L) 05/28/2020 10:01 AM    CALCIUM 9.6 05/28/2020 10:01 AM    ASTSGOT 42 05/28/2020 10:01 AM    ALTSGPT 58 (H) 05/28/2020 10:01 AM    TBILIRUBIN 0.8 05/28/2020 10:01 AM    ALBUMIN 4.4 05/28/2020 10:01 AM    TOTPROTEIN 7.9 05/28/2020 10:01 AM    GLOBULIN 3.5 05/28/2020 10:01 AM    AGRATIO 1.3 05/28/2020 10:01 AM       Lab Results   Component Value Date/Time    CHOLSTRLTOT 218 (H) 05/28/2020 1001    TRIGLYCERIDE 276 (H) 05/28/2020 1001    HDL 42 05/28/2020 1001     (H) 05/28/2020 1001       Lab Results   Component Value Date/Time    MALBCRT see below 05/28/2020 10:01 AM    MICROALBUR <1.2 05/28/2020 10:01 AM        Lab Results   Component Value Date/Time    TSHULTRASEN 1.580 05/12/2016 0649     No results found for: FREEDIR  No results found for: FREET3  No results found for: THYSTIMIG        ASSESSMENT/PLAN:     1. Controlled type 2 diabetes mellitus without complication, with long-term current use of insulin (HCC)  Fair control  Continue current medications  Recommend that she watch her carb intake  Foot exam was completed today  We are requesting records of her eye exam  Reviewed plate method of eating  We will plan for follow-up in 3 months a repeat of her A1c    2. Dyslipidemia  Uncontrolled due to noncompliance with her diet  Continue Crestor  She is getting a repeat lipid profile with her PCP in 2 months    3. Long-term insulin use (HCC)  Patient is on long-term basal insulin therapy for type 2 diabetes      Return in about 3 months (around 9/24/2020).      Thank you  kindly for allowing me to participate in the diabetes care plan for this patient.    Eris Almonte MD, [facility], [facility]  06/24/20    CC:   Yaya Frank P.A.-C.

## 2020-07-20 RX ORDER — OMEPRAZOLE 40 MG/1
CAPSULE, DELAYED RELEASE ORAL
Qty: 30 CAP | Refills: 0 | Status: SHIPPED | OUTPATIENT
Start: 2020-07-20 | End: 2020-08-16

## 2020-07-20 NOTE — TELEPHONE ENCOUNTER
Received request via: Pharmacy    Was the patient seen in the last year in this department? Yes    Patients next Appointment:  8/20/2020     Requested Prescriptions     Pending Prescriptions Disp Refills   • omeprazole (PRILOSEC) 40 MG delayed-release capsule [Pharmacy Med Name: Omeprazole Oral Capsule Delayed Release 40 MG] 30 Cap 0     Sig: TAKE ONE CAPSULE BY MOUTH ONE TIME DAILY

## 2020-08-03 DIAGNOSIS — Z79.4 CONTROLLED TYPE 2 DIABETES MELLITUS WITHOUT COMPLICATION, WITH LONG-TERM CURRENT USE OF INSULIN (HCC): ICD-10-CM

## 2020-08-03 DIAGNOSIS — I10 ESSENTIAL HYPERTENSION: ICD-10-CM

## 2020-08-03 DIAGNOSIS — E11.9 CONTROLLED TYPE 2 DIABETES MELLITUS WITHOUT COMPLICATION, WITH LONG-TERM CURRENT USE OF INSULIN (HCC): ICD-10-CM

## 2020-08-03 RX ORDER — AMLODIPINE BESYLATE 5 MG/1
TABLET ORAL
Qty: 30 TAB | Refills: 0 | Status: SHIPPED | OUTPATIENT
Start: 2020-08-03 | End: 2020-09-05

## 2020-08-03 NOTE — TELEPHONE ENCOUNTER
Received request via: Pharmacy    Was the patient seen in the last year in this department? Yes    Patients next Appointment:  9/17/2020     Requested Prescriptions     Pending Prescriptions Disp Refills   • amLODIPine (NORVASC) 5 MG Tab [Pharmacy Med Name: amLODIPine Besylate Oral Tablet 5 MG] 30 Tab 0     Sig: TAKE ONE TABLET BY MOUTH ONE TIME DAILY

## 2020-08-03 NOTE — TELEPHONE ENCOUNTER
Received request via: Pharmacy    Was the patient seen in the last year in this department? Yes    Does the patient have an active prescription (recently filled or refills available) for medication(s) requested? No     Ozempic (1 MG/DOSE) Subcutaneous Solution Pen-injector 2 MG/1.5ML         Will file in chart as: OZEMPIC, 1 MG/DOSE, 2 MG/1.5ML Solution Pen-injector        Sig: inject 1mg (0.75ml) subcutaneously as instructed every 7 days    Disp:  3 mL    Refills:  0    Start: 8/3/2020    Class: Normal    Non-formulary    Last ordered: 11 months ago by Austin Ochoa P.A.-C.  Last refill: 6/27/2020    Rx #: [id number]

## 2020-08-04 DIAGNOSIS — Z79.4 CONTROLLED TYPE 2 DIABETES MELLITUS WITHOUT COMPLICATION, WITH LONG-TERM CURRENT USE OF INSULIN (HCC): ICD-10-CM

## 2020-08-04 DIAGNOSIS — E11.9 CONTROLLED TYPE 2 DIABETES MELLITUS WITHOUT COMPLICATION, WITH LONG-TERM CURRENT USE OF INSULIN (HCC): ICD-10-CM

## 2020-08-05 DIAGNOSIS — Z79.4 CONTROLLED TYPE 2 DIABETES MELLITUS WITHOUT COMPLICATION, WITH LONG-TERM CURRENT USE OF INSULIN (HCC): ICD-10-CM

## 2020-08-05 DIAGNOSIS — E11.9 CONTROLLED TYPE 2 DIABETES MELLITUS WITHOUT COMPLICATION, WITH LONG-TERM CURRENT USE OF INSULIN (HCC): ICD-10-CM

## 2020-08-05 RX ORDER — SEMAGLUTIDE 1.34 MG/ML
1 INJECTION, SOLUTION SUBCUTANEOUS
Qty: 2 PEN | Refills: 11 | Status: SHIPPED
Start: 2020-08-05 | End: 2020-09-17

## 2020-08-05 NOTE — TELEPHONE ENCOUNTER
Received request via: Patient    Was the patient seen in the last year in this department? Yes    Does the patient have an active prescription (recently filled or refills available) for medication(s) requested? No           Semaglutide (OZEMPIC) 1 MG/DOSE Solution Pen-injector [Austin Ochoa, P.A.-C.]

## 2020-08-05 NOTE — TELEPHONE ENCOUNTER
Received request via: Pharmacy    Was the patient seen in the last year in this department? Yes    Does the patient have an active prescription (recently filled or refills available) for medication(s) requested? No      Semaglutide, 1 MG/DOSE, (OZEMPIC, 1 MG/DOSE,) 2 MG/1.5ML Solution Pen-injector        Possible duplicate: Hover to review recent actions on this medication   Sig: Inject 1 mg as instructed every 7 days.   Disp:  2 PEN    Refills:  11   Start: 8/5/2020   Class: Normal   Non-formulary

## 2020-08-06 RX ORDER — SEMAGLUTIDE 1.34 MG/ML
INJECTION, SOLUTION SUBCUTANEOUS
Qty: 3 ML | Refills: 0 | Status: SHIPPED
Start: 2020-08-06 | End: 2020-09-17

## 2020-08-06 RX ORDER — SEMAGLUTIDE 1.34 MG/ML
1 INJECTION, SOLUTION SUBCUTANEOUS
Qty: 2 PEN | Refills: 11 | Status: SHIPPED | OUTPATIENT
Start: 2020-08-06 | End: 2021-04-14 | Stop reason: SDUPTHER

## 2020-08-06 RX ORDER — SEMAGLUTIDE 1.34 MG/ML
1 INJECTION, SOLUTION SUBCUTANEOUS
Qty: 2 PEN | Refills: 11 | Status: SHIPPED
Start: 2020-08-06 | End: 2020-09-17

## 2020-08-16 RX ORDER — ESTRADIOL 1 MG/1
TABLET ORAL
Qty: 30 TAB | Refills: 0 | Status: SHIPPED | OUTPATIENT
Start: 2020-08-16 | End: 2020-09-18

## 2020-08-16 RX ORDER — OMEPRAZOLE 40 MG/1
CAPSULE, DELAYED RELEASE ORAL
Qty: 30 CAP | Refills: 0 | Status: SHIPPED | OUTPATIENT
Start: 2020-08-16 | End: 2020-09-18

## 2020-09-10 ENCOUNTER — HOSPITAL ENCOUNTER (OUTPATIENT)
Dept: LAB | Facility: MEDICAL CENTER | Age: 60
End: 2020-09-10
Attending: INTERNAL MEDICINE
Payer: COMMERCIAL

## 2020-09-10 DIAGNOSIS — Z79.4 CONTROLLED TYPE 2 DIABETES MELLITUS WITHOUT COMPLICATION, WITH LONG-TERM CURRENT USE OF INSULIN (HCC): ICD-10-CM

## 2020-09-10 DIAGNOSIS — E11.9 CONTROLLED TYPE 2 DIABETES MELLITUS WITHOUT COMPLICATION, WITH LONG-TERM CURRENT USE OF INSULIN (HCC): ICD-10-CM

## 2020-09-10 DIAGNOSIS — E78.5 DYSLIPIDEMIA: ICD-10-CM

## 2020-09-10 LAB
ALBUMIN SERPL BCP-MCNC: 4.1 G/DL (ref 3.2–4.9)
ALBUMIN/GLOB SERPL: 1.3 G/DL
ALP SERPL-CCNC: 55 U/L (ref 30–99)
ALT SERPL-CCNC: 37 U/L (ref 2–50)
ANION GAP SERPL CALC-SCNC: 13 MMOL/L (ref 7–16)
AST SERPL-CCNC: 33 U/L (ref 12–45)
BILIRUB SERPL-MCNC: 0.6 MG/DL (ref 0.1–1.5)
BUN SERPL-MCNC: 12 MG/DL (ref 8–22)
CALCIUM SERPL-MCNC: 9 MG/DL (ref 8.5–10.5)
CHLORIDE SERPL-SCNC: 100 MMOL/L (ref 96–112)
CO2 SERPL-SCNC: 25 MMOL/L (ref 20–33)
CREAT SERPL-MCNC: 0.41 MG/DL (ref 0.5–1.4)
FASTING STATUS PATIENT QL REPORTED: NORMAL
GLOBULIN SER CALC-MCNC: 3.1 G/DL (ref 1.9–3.5)
GLUCOSE SERPL-MCNC: 107 MG/DL (ref 65–99)
POTASSIUM SERPL-SCNC: 4.1 MMOL/L (ref 3.6–5.5)
PROT SERPL-MCNC: 7.2 G/DL (ref 6–8.2)
SODIUM SERPL-SCNC: 138 MMOL/L (ref 135–145)
T4 FREE SERPL-MCNC: 1.22 NG/DL (ref 0.93–1.7)
TSH SERPL DL<=0.005 MIU/L-ACNC: 0.97 UIU/ML (ref 0.38–5.33)

## 2020-09-10 PROCEDURE — 84443 ASSAY THYROID STIM HORMONE: CPT

## 2020-09-10 PROCEDURE — 83036 HEMOGLOBIN GLYCOSYLATED A1C: CPT

## 2020-09-10 PROCEDURE — 84439 ASSAY OF FREE THYROXINE: CPT

## 2020-09-10 PROCEDURE — 80053 COMPREHEN METABOLIC PANEL: CPT

## 2020-09-11 LAB
EST. AVERAGE GLUCOSE BLD GHB EST-MCNC: 166 MG/DL
HBA1C MFR BLD: 7.4 % (ref 0–5.6)

## 2020-09-17 ENCOUNTER — OFFICE VISIT (OUTPATIENT)
Dept: MEDICAL GROUP | Facility: MEDICAL CENTER | Age: 60
End: 2020-09-17
Payer: COMMERCIAL

## 2020-09-17 VITALS
HEART RATE: 90 BPM | HEIGHT: 60 IN | BODY MASS INDEX: 33.38 KG/M2 | OXYGEN SATURATION: 97 % | RESPIRATION RATE: 16 BRPM | DIASTOLIC BLOOD PRESSURE: 74 MMHG | WEIGHT: 170 LBS | TEMPERATURE: 98.2 F | SYSTOLIC BLOOD PRESSURE: 126 MMHG

## 2020-09-17 DIAGNOSIS — Z79.4 CONTROLLED TYPE 2 DIABETES MELLITUS WITHOUT COMPLICATION, WITH LONG-TERM CURRENT USE OF INSULIN (HCC): ICD-10-CM

## 2020-09-17 DIAGNOSIS — E11.9 CONTROLLED TYPE 2 DIABETES MELLITUS WITHOUT COMPLICATION, WITH LONG-TERM CURRENT USE OF INSULIN (HCC): ICD-10-CM

## 2020-09-17 DIAGNOSIS — M79.605 LEFT LEG PAIN: ICD-10-CM

## 2020-09-17 DIAGNOSIS — E78.5 DYSLIPIDEMIA: ICD-10-CM

## 2020-09-17 PROCEDURE — 99214 OFFICE O/P EST MOD 30 MIN: CPT | Performed by: PHYSICIAN ASSISTANT

## 2020-09-17 NOTE — ASSESSMENT & PLAN NOTE
This is a pleasant 59-year-old female here today to discuss her diabetes.  Last A1c was 7.4.  She is disappointed her numbers are not better.  She has been eating well.  Or not eating at all.  Her fasting glucose level was at 107.  She was nonfasting for 14 hours.  She is currently on Ozempic 1 mg weekly as well as insulin.  She states after the injection of Ozempic she has fatigue for 24 hours.  She denies any hypoglycemia.

## 2020-09-17 NOTE — PROGRESS NOTES
Subjective:   Rosario Blevins is a 59 y.o. female here today for diabetes, dyslipidemia and left leg pain chronically.    Controlled type 2 diabetes mellitus without complication, with long-term current use of insulin ([facility])  This is a pleasant 59-year-old female here today to discuss her diabetes.  Last A1c was 7.4.  She is disappointed her numbers are not better.  She has been eating well.  Or not eating at all.  Her fasting glucose level was at 107.  She was nonfasting for 14 hours.  She is currently on Ozempic 1 mg weekly as well as insulin.  She states after the injection of Ozempic she has fatigue for 24 hours.  She denies any hypoglycemia.    Dyslipidemia  Chronic condition.  Last cholesterol profile showed elevation in her LDL and total cholesterol level.  I asked her if she was taking her Crestor and she never applied.  It was assumed by endocrinology that I did order cholesterol profile to be repeated but I had not.  She does take a 40 mg tablet but cuts it in half.    Left leg pain  Chronic condition.  At least for over 4 years.  Pain worse with overuse.         Current medicines (including changes today)  Current Outpatient Medications   Medication Sig Dispense Refill   • amLODIPine (NORVASC) 5 MG Tab TAKE ONE TABLET BY MOUTH ONE TIME DAILY  90 Tab 0   • omeprazole (PRILOSEC) 40 MG delayed-release capsule TAKE ONE CAPSULE BY MOUTH ONE TIME DAILY  30 Cap 0   • estradiol (ESTRACE) 1 MG Tab TAKE ONE TABLET BY MOUTH EVERY DAY  30 Tab 0   • Semaglutide, 1 MG/DOSE, (OZEMPIC, 1 MG/DOSE,) 2 MG/1.5ML Solution Pen-injector Inject 1 mg as instructed every 7 days. 2 PEN 11   • ibuprofen (MOTRIN) 800 MG Tab TAKE ONE TABLET BY MOUTH EVERY EIGHT HOURS AS NEEDED  30 Tab 0   • lisinopril (PRINIVIL) 20 MG Tab Take 1 Tab by mouth every day. 30 Tab 11   • rosuvastatin (CRESTOR) 40 MG tablet Take 1 Tab by mouth every day. 30 Tab 11   • insulin glargine (LANTUS SOLOSTAR) 100 UNIT/ML Solution Pen-injector injection INJECT  26 UNITS SUBCUTANEOUSLY AS INSTRUCTED EVERY EVENING 5 PEN 9   • Multiple Vitamins-Minerals (CENTRUM SILVER PO) Take  by mouth every day.       No current facility-administered medications for this visit.      She  has a past medical history of Diabetes, Hyperlipidemia, and Hypertension.    Social History and Family History were reviewed and updated.    ROS   No chest pain, no shortness of breath, no abdominal pain and all other systems were reviewed and are negative.       Objective:     /74   Pulse 90   Temp 36.8 °C (98.2 °F) (Temporal)   Resp 16   Ht 1.524 m (5')   Wt 77.1 kg (170 lb)   SpO2 97%  Body mass index is 33.2 kg/m².   Physical Exam:  Constitutional: Alert, no distress.  Skin: Warm, dry, good turgor, no rashes in visible areas.  Eye: Equal, round and reactive, conjunctiva clear, lids normal.  ENMT: Lips without lesions, good dentition, oropharynx clear.  Neck: Trachea midline, no masses.   Lymph: No cervical or supraclavicular lymphadenopathy  Respiratory: Unlabored respiratory effort, lungs appear clear, no wheezes.  Cardiovascular: Regular rate and rhythm.  Psych: Alert and oriented x3, normal affect and mood.        Assessment and Plan:   The following treatment plan was discussed    1. Controlled type 2 diabetes mellitus without complication, with long-term current use of insulin (HCC)  Chronic condition.  Stable.  Continue medications as directed.  Follow-up with endocrinology.  Ordered cholesterol profile.  Advised her that fasting for 8 hours would not have any effect on her A1c.  Also mentioned that her glucose was excellent.    2. Dyslipidemia  Chronic condition.  Uncontrolled.  Discussed wishes of improving her cholesterol profile through taking the full dose of Crestor at 40 mg.  Will repeat cholesterol profile 1 month fasting after 8 hours.  - Lipid Profile; Future    3. Left leg pain  Chronic condition.  Unknown etiology.  Offered referral to orthopedics.  She declined.  We will  continue to monitor.      Followup: Return in about 6 months (around 3/17/2021), or if symptoms worsen or fail to improve.    Please note that this dictation was created using voice recognition software. I have made every reasonable attempt to correct obvious errors, but I expect that there are errors of grammar and possibly content that I did not discover before finalizing the note.

## 2020-09-17 NOTE — ASSESSMENT & PLAN NOTE
Chronic condition.  Last cholesterol profile showed elevation in her LDL and total cholesterol level.  I asked her if she was taking her Crestor and she never applied.  It was assumed by endocrinology that I did order cholesterol profile to be repeated but I had not.  She does take a 40 mg tablet but cuts it in half.

## 2020-09-23 ENCOUNTER — OFFICE VISIT (OUTPATIENT)
Dept: ENDOCRINOLOGY | Facility: MEDICAL CENTER | Age: 60
End: 2020-09-23
Attending: INTERNAL MEDICINE
Payer: COMMERCIAL

## 2020-09-23 VITALS
WEIGHT: 172.6 LBS | DIASTOLIC BLOOD PRESSURE: 70 MMHG | HEART RATE: 88 BPM | SYSTOLIC BLOOD PRESSURE: 118 MMHG | BODY MASS INDEX: 33.89 KG/M2 | OXYGEN SATURATION: 98 % | HEIGHT: 60 IN

## 2020-09-23 DIAGNOSIS — Z79.4 CONTROLLED TYPE 2 DIABETES MELLITUS WITHOUT COMPLICATION, WITH LONG-TERM CURRENT USE OF INSULIN (HCC): ICD-10-CM

## 2020-09-23 DIAGNOSIS — E78.5 DYSLIPIDEMIA: ICD-10-CM

## 2020-09-23 DIAGNOSIS — E11.9 CONTROLLED TYPE 2 DIABETES MELLITUS WITHOUT COMPLICATION, WITH LONG-TERM CURRENT USE OF INSULIN (HCC): ICD-10-CM

## 2020-09-23 DIAGNOSIS — Z79.4 LONG-TERM INSULIN USE (HCC): ICD-10-CM

## 2020-09-23 PROCEDURE — 99214 OFFICE O/P EST MOD 30 MIN: CPT | Performed by: INTERNAL MEDICINE

## 2020-09-23 PROCEDURE — 99211 OFF/OP EST MAY X REQ PHY/QHP: CPT | Performed by: INTERNAL MEDICINE

## 2020-09-23 RX ORDER — INSULIN GLARGINE 100 [IU]/ML
40 INJECTION, SOLUTION SUBCUTANEOUS EVERY EVENING
Qty: 5 EACH | Refills: 11 | Status: SHIPPED | OUTPATIENT
Start: 2020-09-23 | End: 2021-04-14 | Stop reason: SDUPTHER

## 2020-09-23 NOTE — PROGRESS NOTES
CHIEF COMPLAINT: Patient is here for follow up of Type 2 Diabetes Mellitus    HPI:     Rosario Blevins is a 59 y.o. female with Type 2 Diabetes Mellitus here for follow up.    Labs from 5/28/2020 HbA1c is 7.2%    She is taking Lantus 26u daily, and Ozempic 1.0 mg once a week  She reports that she cannot tolerate Metformin because of diarrhea  She cannot tolerate Farxiga because it made her feel sick and gave her bad a yeast infection      She feels woozy when her fasting Bg is close to 100 and she cant tolerate the pseudohypoglycemic symptoms    She admits to hyperglycemia  She denies hypoglycemia  She claims that she has been to the DM educator but was not impressed        She has no history of CAD  She has hyperlipidemia and is on generic Crestor 40mg daily  She denies symptoms of statin related myopathy        She has essential hypertension is taking lisinopril  She is tolerating her medication fairly well  She does not have diabetic nephropathy and last urine microalbumin was normal on 5/2020    We have her eye exam and she has no diabetic retinopathy on exam from 7/9/2020      BG Diary:  Patient did not bring her BG meter    Weight has been stable    Diabetes Complications   Retinopathy: No known retinopathy.  Last eye exam: 7/9/2020 with Dr. Taylor  Neuropathy: Denies paresthesias or numbness in hands or feet. Denies any foot wounds.  Exercise: Minimal.  Diet: Fair.  Patient's medications, allergies, and social histories were reviewed and updated as appropriate.    ROS:     CONS:     No fever, no chills   EYES:     No diplopia, no blurry vision   CV:           No chest pain, no palpitations   PULM:     No SOB, no cough, no hemoptysis.   GI:            No nausea, no vomiting, no diarrhea, no constipation   ENDO:     No polyuria, no polydipsia, no heat intolerance, no cold intolerance       Past Medical History:  Problem List:  2020-06: Long-term insulin use (HCC)  2019-04: Left leg pain  2019-01: Influenza  vaccination given  2018-10: Lipoma of back  2018-10: History of shingles  2018: Obesity (BMI 30-39.9)  2018: GERD (gastroesophageal reflux disease)  2013: Controlled type 2 diabetes mellitus without complication,   with long-term current use of insulin (HCC)  2013: HTN (hypertension)  2013: Dyslipidemia      Past Surgical History:  Past Surgical History:   Procedure Laterality Date   • CATARACT EXTRACTION WITH IOL Bilateral    • HYSTERECTOMY, TOTAL ABDOMINAL     • US-NEEDLE CORE BX-BREAST PANEL          Allergies:  Milk products food allergy and Codeine     Social History:  Social History     Tobacco Use   • Smoking status: Former Smoker     Types: Cigarettes     Quit date: 1991     Years since quittin.6   • Smokeless tobacco: Never Used   Substance Use Topics   • Alcohol use: No   • Drug use: No        Family History:   family history includes Cancer in her mother; Heart Attack (age of onset: 38) in her maternal uncle; Heart Attack (age of onset: 40) in her maternal uncle; Heart Attack (age of onset: 50) in her maternal grandmother; No Known Problems in her father; Other in her brother and sister; Other (age of onset: 47) in her mother.      PHYSICAL EXAM:   OBJECTIVE:  Vital signs: /70 (BP Location: Left arm, Patient Position: Sitting, BP Cuff Size: Adult)   Pulse 88   Ht 1.524 m (5')   Wt 78.3 kg (172 lb 9.6 oz)   SpO2 98%   BMI 33.71 kg/m²   GENERAL: Well-developed, well-nourished in no apparent distress.   EYE:  No ocular asymmetry, PERRLA  HENT: Pink, moist mucous membranes.    NECK: No thyromegaly.   CARDIOVASCULAR:  No murmurs  LUNGS: Clear breath sounds  ABDOMEN: Soft, nontender   EXTREMITIES: No clubbing, cyanosis, or edema.   NEUROLOGICAL: No gross focal motor abnormalities   LYMPH: No cervical adenopathy palpated.   SKIN: No rashes, lesions.   Monofilament testing with a 10 gram force: sensation: intact bilaterally  Visual Inspection: Feet without maceration,  ulcers, or fissures.  Pedal pulses: intact bilaterally    Labs:  Lab Results   Component Value Date/Time    HBA1C 7.4 (H) 09/10/2020 09:42 AM        Lab Results   Component Value Date/Time    WBC 6.6 05/12/2016 06:49 AM    RBC 5.17 05/12/2016 06:49 AM    HEMOGLOBIN 14.8 05/12/2016 06:49 AM    MCV 87.2 05/12/2016 06:49 AM    MCH 28.6 05/12/2016 06:49 AM    MCHC 32.8 (L) 05/12/2016 06:49 AM    RDW 41.7 05/12/2016 06:49 AM    MPV 11.0 05/12/2016 06:49 AM       Lab Results   Component Value Date/Time    SODIUM 138 09/10/2020 09:42 AM    POTASSIUM 4.1 09/10/2020 09:42 AM    CHLORIDE 100 09/10/2020 09:42 AM    CO2 25 09/10/2020 09:42 AM    ANION 13.0 09/10/2020 09:42 AM    GLUCOSE 107 (H) 09/10/2020 09:42 AM    BUN 12 09/10/2020 09:42 AM    CREATININE 0.41 (L) 09/10/2020 09:42 AM    CALCIUM 9.0 09/10/2020 09:42 AM    ASTSGOT 33 09/10/2020 09:42 AM    ALTSGPT 37 09/10/2020 09:42 AM    TBILIRUBIN 0.6 09/10/2020 09:42 AM    ALBUMIN 4.1 09/10/2020 09:42 AM    TOTPROTEIN 7.2 09/10/2020 09:42 AM    GLOBULIN 3.1 09/10/2020 09:42 AM    AGRATIO 1.3 09/10/2020 09:42 AM       Lab Results   Component Value Date/Time    CHOLSTRLTOT 218 (H) 05/28/2020 1001    TRIGLYCERIDE 276 (H) 05/28/2020 1001    HDL 42 05/28/2020 1001     (H) 05/28/2020 1001       Lab Results   Component Value Date/Time    MALBCRT see below 05/28/2020 10:01 AM    MICROALBUR <1.2 05/28/2020 10:01 AM        Lab Results   Component Value Date/Time    TSHULTRASEN 1.580 05/12/2016 0649     No results found for: FREEDIR  No results found for: FREET3  No results found for: THYSTIMIG        ASSESSMENT/PLAN:     1. Controlled type 2 diabetes mellitus without complication, with long-term current use of insulin (HCC)  A1c is trending up  Increase Lantus to 30u daily  Continue Ozempic 1.0mg weekly  Recommend that she watch her carb intake  Reviewed plate method of eating  Recommend regular exercise  We will plan for follow-up in 3 months a repeat of her A1c    2.  Dyslipidemia  Uncontrolled due to noncompliance with her diet  Continue Crestor  Will check her fasting lipids    3. Long-term insulin use (HCC)  Patient is on long-term basal insulin therapy for type 2 diabetes      Return in about 3 months (around 12/23/2020).      Thank you kindly for allowing me to participate in the diabetes care plan for this patient.    Eris Almonte MD, [facility], [facility]  06/24/20    CC:   Yaya Frank P.A.-C.

## 2020-09-24 ENCOUNTER — APPOINTMENT (OUTPATIENT)
Dept: MEDICAL GROUP | Facility: MEDICAL CENTER | Age: 60
End: 2020-09-24
Payer: COMMERCIAL

## 2020-10-01 ENCOUNTER — APPOINTMENT (OUTPATIENT)
Dept: MEDICAL GROUP | Facility: MEDICAL CENTER | Age: 60
End: 2020-10-01
Payer: COMMERCIAL

## 2020-10-08 RX ORDER — IBUPROFEN 800 MG/1
800 TABLET ORAL EVERY 8 HOURS PRN
Qty: 30 TAB | Refills: 0 | Status: SHIPPED | OUTPATIENT
Start: 2020-10-08 | End: 2020-11-05

## 2020-10-09 NOTE — TELEPHONE ENCOUNTER
Received request via: Patient    Was the patient seen in the last year in this department? Yes    Does the patient have an active prescription (recently filled or refills available) for medication(s) requested? Takes as needed. Last written on 6/14

## 2020-11-05 RX ORDER — IBUPROFEN 800 MG/1
TABLET ORAL
Qty: 30 TAB | Refills: 0 | Status: SHIPPED | OUTPATIENT
Start: 2020-11-05 | End: 2021-01-31

## 2020-11-05 NOTE — TELEPHONE ENCOUNTER
Received request via: Pharmacy    Was the patient seen in the last year in this department? Yes    Does the patient have an active prescription (recently filled or refills available) for medication(s) requested? No. Last written by Dr. Reyna on 10/8. Takes as needed.

## 2020-11-29 DIAGNOSIS — I10 ESSENTIAL HYPERTENSION: ICD-10-CM

## 2020-11-30 RX ORDER — AMLODIPINE BESYLATE 5 MG/1
TABLET ORAL
Qty: 90 TAB | Refills: 0 | Status: SHIPPED | OUTPATIENT
Start: 2020-11-30 | End: 2021-03-04 | Stop reason: SDUPTHER

## 2020-11-30 NOTE — TELEPHONE ENCOUNTER
Received request via: Pharmacy    Was the patient seen in the last year in this department? Yes    Patients next Appointment:  3/18/2021     Requested Prescriptions     Pending Prescriptions Disp Refills   • amLODIPine (NORVASC) 5 MG Tab [Pharmacy Med Name: amLODIPine Besylate Oral Tablet 5 MG] 90 Tab 0     Sig: TAKE ONE TABLET BY MOUTH ONE TIME DAILY

## 2021-03-04 DIAGNOSIS — I10 ESSENTIAL HYPERTENSION: ICD-10-CM

## 2021-03-04 RX ORDER — AMLODIPINE BESYLATE 5 MG/1
5 TABLET ORAL DAILY
Qty: 90 TABLET | Refills: 1 | Status: SHIPPED | OUTPATIENT
Start: 2021-03-04 | End: 2021-08-27

## 2021-03-08 RX ORDER — ESTRADIOL 1 MG/1
TABLET ORAL
Qty: 90 TABLET | Refills: 0 | Status: SHIPPED | OUTPATIENT
Start: 2021-03-08 | End: 2021-06-06

## 2021-03-08 RX ORDER — OMEPRAZOLE 40 MG/1
CAPSULE, DELAYED RELEASE ORAL
Qty: 90 CAPSULE | Refills: 0 | Status: SHIPPED | OUTPATIENT
Start: 2021-03-08 | End: 2021-03-31 | Stop reason: SDUPTHER

## 2021-03-08 NOTE — TELEPHONE ENCOUNTER
Received request via: Pharmacy    Was the patient seen in the last year in this department? Yes    Patients next Appointment:  3/18/2021     Requested Prescriptions     Pending Prescriptions Disp Refills   • omeprazole (PRILOSEC) 40 MG delayed-release capsule [Pharmacy Med Name: Omeprazole Oral Capsule Delayed Release 40 MG] 90 capsule 0     Sig: TAKE ONE CAPSULE BY MOUTH ONE TIME DAILY   • estradiol (ESTRACE) 1 MG Tab [Pharmacy Med Name: Estradiol Oral Tablet 1 MG] 90 tablet 0     Sig: TAKE ONE TABLET BY MOUTH ONE TIME DAILY

## 2021-03-15 DIAGNOSIS — Z23 NEED FOR VACCINATION: ICD-10-CM

## 2021-03-23 ENCOUNTER — TELEPHONE (OUTPATIENT)
Dept: ENDOCRINOLOGY | Facility: MEDICAL CENTER | Age: 61
End: 2021-03-23

## 2021-03-24 ENCOUNTER — IMMUNIZATION (OUTPATIENT)
Dept: FAMILY PLANNING/WOMEN'S HEALTH CLINIC | Facility: IMMUNIZATION CENTER | Age: 61
End: 2021-03-24
Attending: INTERNAL MEDICINE
Payer: COMMERCIAL

## 2021-03-24 ENCOUNTER — HOSPITAL ENCOUNTER (OUTPATIENT)
Dept: LAB | Facility: MEDICAL CENTER | Age: 61
End: 2021-03-24
Attending: INTERNAL MEDICINE
Payer: COMMERCIAL

## 2021-03-24 DIAGNOSIS — Z23 ENCOUNTER FOR VACCINATION: Primary | ICD-10-CM

## 2021-03-24 DIAGNOSIS — Z79.4 CONTROLLED TYPE 2 DIABETES MELLITUS WITHOUT COMPLICATION, WITH LONG-TERM CURRENT USE OF INSULIN (HCC): ICD-10-CM

## 2021-03-24 DIAGNOSIS — E78.5 DYSLIPIDEMIA: ICD-10-CM

## 2021-03-24 DIAGNOSIS — Z23 NEED FOR VACCINATION: ICD-10-CM

## 2021-03-24 DIAGNOSIS — E11.9 CONTROLLED TYPE 2 DIABETES MELLITUS WITHOUT COMPLICATION, WITH LONG-TERM CURRENT USE OF INSULIN (HCC): ICD-10-CM

## 2021-03-24 LAB
CHOLEST SERPL-MCNC: 155 MG/DL (ref 100–199)
EST. AVERAGE GLUCOSE BLD GHB EST-MCNC: 154 MG/DL
FASTING STATUS PATIENT QL REPORTED: NORMAL
HBA1C MFR BLD: 7 % (ref 4–5.6)
HDLC SERPL-MCNC: 41 MG/DL
LDLC SERPL CALC-MCNC: 87 MG/DL
TRIGL SERPL-MCNC: 137 MG/DL (ref 0–149)

## 2021-03-24 PROCEDURE — 91300 PFIZER SARS-COV-2 VACCINE: CPT

## 2021-03-24 PROCEDURE — 80061 LIPID PANEL: CPT

## 2021-03-24 PROCEDURE — 36415 COLL VENOUS BLD VENIPUNCTURE: CPT

## 2021-03-24 PROCEDURE — 0001A PFIZER SARS-COV-2 VACCINE: CPT

## 2021-03-24 PROCEDURE — 83036 HEMOGLOBIN GLYCOSYLATED A1C: CPT

## 2021-03-31 ENCOUNTER — OFFICE VISIT (OUTPATIENT)
Dept: MEDICAL GROUP | Facility: MEDICAL CENTER | Age: 61
End: 2021-03-31
Payer: COMMERCIAL

## 2021-03-31 VITALS
BODY MASS INDEX: 33.77 KG/M2 | RESPIRATION RATE: 16 BRPM | WEIGHT: 172 LBS | TEMPERATURE: 97.8 F | HEART RATE: 78 BPM | OXYGEN SATURATION: 98 % | DIASTOLIC BLOOD PRESSURE: 74 MMHG | SYSTOLIC BLOOD PRESSURE: 126 MMHG | HEIGHT: 60 IN

## 2021-03-31 DIAGNOSIS — Z79.4 CONTROLLED TYPE 2 DIABETES MELLITUS WITHOUT COMPLICATION, WITH LONG-TERM CURRENT USE OF INSULIN (HCC): ICD-10-CM

## 2021-03-31 DIAGNOSIS — M76.60 ACHILLES TENDON PAIN: ICD-10-CM

## 2021-03-31 DIAGNOSIS — E11.9 CONTROLLED TYPE 2 DIABETES MELLITUS WITHOUT COMPLICATION, WITH LONG-TERM CURRENT USE OF INSULIN (HCC): ICD-10-CM

## 2021-03-31 DIAGNOSIS — E66.9 OBESITY (BMI 30-39.9): ICD-10-CM

## 2021-03-31 DIAGNOSIS — E78.5 DYSLIPIDEMIA: ICD-10-CM

## 2021-03-31 DIAGNOSIS — I10 ESSENTIAL HYPERTENSION: ICD-10-CM

## 2021-03-31 DIAGNOSIS — K21.9 GASTROESOPHAGEAL REFLUX DISEASE WITHOUT ESOPHAGITIS: ICD-10-CM

## 2021-03-31 PROCEDURE — 99214 OFFICE O/P EST MOD 30 MIN: CPT | Performed by: PHYSICIAN ASSISTANT

## 2021-03-31 RX ORDER — ROSUVASTATIN CALCIUM 40 MG/1
TABLET, COATED ORAL
Qty: 90 TABLET | Refills: 2 | Status: SHIPPED | OUTPATIENT
Start: 2021-03-31 | End: 2022-01-26 | Stop reason: SDUPTHER

## 2021-03-31 RX ORDER — OMEPRAZOLE 40 MG/1
CAPSULE, DELAYED RELEASE ORAL
Qty: 90 CAPSULE | Refills: 2 | Status: SHIPPED | OUTPATIENT
Start: 2021-03-31 | End: 2021-11-29

## 2021-03-31 RX ORDER — LISINOPRIL 20 MG/1
20 TABLET ORAL DAILY
Qty: 90 TABLET | Refills: 2 | Status: SHIPPED | OUTPATIENT
Start: 2021-03-31 | End: 2021-11-29

## 2021-03-31 ASSESSMENT — PATIENT HEALTH QUESTIONNAIRE - PHQ9: CLINICAL INTERPRETATION OF PHQ2 SCORE: 0

## 2021-03-31 NOTE — ASSESSMENT & PLAN NOTE
Chronic condition.  Recent cholesterol profile in great range.  Takes Crestor 40 mg in the evening.  Medication causes her drowsiness.

## 2021-03-31 NOTE — ASSESSMENT & PLAN NOTE
Chronic condition.  Believes she needs a refill of omeprazole.  She takes 40 mg daily.  Medication helpful.

## 2021-03-31 NOTE — ASSESSMENT & PLAN NOTE
This is a 60-year-old female complains of a 6-week history of right-sided lower leg pain on the backside.  Denies any known trauma.  May have been done at work but she does not specifically note.  Has a sharp pain occasionally typically after walking for long times.  Denies any swelling or bruising.  Has worn a brace.  Takes ibuprofen as needed.

## 2021-03-31 NOTE — ASSESSMENT & PLAN NOTE
Recent A1c at 7.0.  Last one at 7.4.  She has an appointment next month with her endocrinologist.  Had difficulty titrating her insulin.  Was told to go up to 40 mg.  At a high dose she was developing side effects.  At 30 mg she is without any side effects.  Also using Ozempic.

## 2021-03-31 NOTE — PROGRESS NOTES
Subjective:   Rosario Blevins is a 60 y.o. female here today for diabetes, Achilles tendon pain, dyslipidemia and GERD.    Controlled type 2 diabetes mellitus without complication, with long-term current use of insulin ([facility])  Recent A1c at 7.0.  Last one at 7.4.  She has an appointment next month with her endocrinologist.  Had difficulty titrating her insulin.  Was told to go up to 40 mg.  At a high dose she was developing side effects.  At 30 mg she is without any side effects.  Also using Ozempic.    Achilles tendon pain  This is a 60-year-old female complains of a 6-week history of right-sided lower leg pain on the backside.  Denies any known trauma.  May have been done at work but she does not specifically note.  Has a sharp pain occasionally typically after walking for long times.  Denies any swelling or bruising.  Has worn a brace.  Takes ibuprofen as needed.    Dyslipidemia  Chronic condition.  Recent cholesterol profile in great range.  Takes Crestor 40 mg in the evening.  Medication causes her drowsiness.    GERD (gastroesophageal reflux disease)  Chronic condition.  Believes she needs a refill of omeprazole.  She takes 40 mg daily.  Medication helpful.       Current medicines (including changes today)  Current Outpatient Medications   Medication Sig Dispense Refill   • rosuvastatin (CRESTOR) 40 MG tablet TAKE ONE TABLET BY MOUTH ONE TIME DAILY 90 tablet 2   • omeprazole (PRILOSEC) 40 MG delayed-release capsule TAKE ONE CAPSULE BY MOUTH ONE TIME DAILY 90 capsule 2   • lisinopril (PRINIVIL) 20 MG Tab Take 1 tablet by mouth every day. 90 tablet 2   • estradiol (ESTRACE) 1 MG Tab TAKE ONE TABLET BY MOUTH ONE TIME DAILY  90 tablet 0   • amLODIPine (NORVASC) 5 MG Tab Take 1 tablet by mouth every day. 90 tablet 1   • ibuprofen (MOTRIN) 800 MG Tab TAKE ONE TABLET BY MOUTH EVERY EIGHT HOURS AS NEEDED  60 Tab 1   • insulin glargine (LANTUS SOLOSTAR) 100 UNIT/ML Solution Pen-injector injection Inject 40 Units as  instructed every evening. 5 Each 11   • Semaglutide, 1 MG/DOSE, (OZEMPIC, 1 MG/DOSE,) 2 MG/1.5ML Solution Pen-injector Inject 1 mg as instructed every 7 days. 2 PEN 11   • Multiple Vitamins-Minerals (CENTRUM SILVER PO) Take  by mouth every day.       No current facility-administered medications for this visit.     She  has a past medical history of Diabetes, Hyperlipidemia, and Hypertension.    Social History and Family History were reviewed and updated.    ROS   No chest pain, no shortness of breath, no abdominal pain and all other systems were reviewed and are negative.       Objective:     /74   Pulse 78   Temp 36.6 °C (97.8 °F) (Temporal)   Resp 16   Ht 1.524 m (5')   Wt 78 kg (172 lb)   SpO2 98%  Body mass index is 33.59 kg/m².   Physical Exam:  Constitutional: Alert, no distress.  Skin: Warm, dry, good turgor, no rashes in visible areas.  Eye: Equal, round and reactive, conjunctiva clear, lids normal.  ENMT: Lips without lesions, good dentition, oropharynx clear.  Neck: Trachea midline, no masses.   Lymph: No cervical or supraclavicular lymphadenopathy  Respiratory: Unlabored respiratory effort, lungs appear clear, no wheezes.  Cardiovascular: N regular rate rhythm.  Musculoskeletal: Bilateral legs are symmetrical.  No Achilles swelling noted.  No erythema or ecchymosis noted.  Range of motion is good.  Psych: Alert and oriented x3, normal affect and mood.        Assessment and Plan:   The following treatment plan was discussed    1. Achilles tendon pain  Acute, new onset condition.  No rupture noted.  Likely strain.  Advised continue take ibuprofen.  Wear shoes with good support.  Offered referral to orthopedics but she declined.  Also suggested if symptoms continue to go to one of the urgent care orthopedic clinics.  Expect her symptoms to improve in time.    2. Controlled type 2 diabetes mellitus without complication, with long-term current use of insulin (HCC)  Chronic condition.  Stable.   Controlled.  Continue medications as directed.  Follow with endocrinology next month.    3. Dyslipidemia  Chronic condition.  Well-controlled.  Renewed Crestor 40 mg.  - rosuvastatin (CRESTOR) 40 MG tablet; TAKE ONE TABLET BY MOUTH ONE TIME DAILY  Dispense: 90 tablet; Refill: 2    4. Gastroesophageal reflux disease without esophagitis  Chronic condition.  Stable.  Renewed omeprazole.  - omeprazole (PRILOSEC) 40 MG delayed-release capsule; TAKE ONE CAPSULE BY MOUTH ONE TIME DAILY  Dispense: 90 capsule; Refill: 2    5. Essential hypertension  Chronic condition.  Renewed lisinopril.  - lisinopril (PRINIVIL) 20 MG Tab; Take 1 tablet by mouth every day.  Dispense: 90 tablet; Refill: 2      Followup: Return in about 6 months (around 9/30/2021), or if symptoms worsen or fail to improve.    Please note that this dictation was created using voice recognition software. I have made every reasonable attempt to correct obvious errors, but I expect that there are errors of grammar and possibly content that I did not discover before finalizing the note.

## 2021-04-14 ENCOUNTER — OFFICE VISIT (OUTPATIENT)
Dept: ENDOCRINOLOGY | Facility: MEDICAL CENTER | Age: 61
End: 2021-04-14
Attending: INTERNAL MEDICINE
Payer: COMMERCIAL

## 2021-04-14 VITALS
SYSTOLIC BLOOD PRESSURE: 128 MMHG | BODY MASS INDEX: 34.08 KG/M2 | WEIGHT: 173.6 LBS | OXYGEN SATURATION: 96 % | HEIGHT: 60 IN | HEART RATE: 85 BPM | DIASTOLIC BLOOD PRESSURE: 76 MMHG

## 2021-04-14 DIAGNOSIS — Z79.4 CONTROLLED TYPE 2 DIABETES MELLITUS WITHOUT COMPLICATION, WITH LONG-TERM CURRENT USE OF INSULIN (HCC): ICD-10-CM

## 2021-04-14 DIAGNOSIS — Z79.4 LONG-TERM INSULIN USE (HCC): ICD-10-CM

## 2021-04-14 DIAGNOSIS — E78.5 DYSLIPIDEMIA: ICD-10-CM

## 2021-04-14 DIAGNOSIS — E11.9 CONTROLLED TYPE 2 DIABETES MELLITUS WITHOUT COMPLICATION, WITH LONG-TERM CURRENT USE OF INSULIN (HCC): ICD-10-CM

## 2021-04-14 PROCEDURE — 99214 OFFICE O/P EST MOD 30 MIN: CPT | Performed by: INTERNAL MEDICINE

## 2021-04-14 PROCEDURE — 99211 OFF/OP EST MAY X REQ PHY/QHP: CPT | Performed by: INTERNAL MEDICINE

## 2021-04-14 RX ORDER — SEMAGLUTIDE 1.34 MG/ML
1 INJECTION, SOLUTION SUBCUTANEOUS
Qty: 3 ML | Refills: 11 | Status: SHIPPED | OUTPATIENT
Start: 2021-04-14 | End: 2021-07-02 | Stop reason: SDUPTHER

## 2021-04-14 RX ORDER — INSULIN GLARGINE 100 [IU]/ML
40 INJECTION, SOLUTION SUBCUTANEOUS EVERY EVENING
Qty: 5 EACH | Refills: 11 | Status: SHIPPED | OUTPATIENT
Start: 2021-04-14 | End: 2021-10-06 | Stop reason: SDUPTHER

## 2021-04-14 NOTE — PROGRESS NOTES
CHIEF COMPLAINT: Patient is here for follow up of Type 2 Diabetes Mellitus    HPI:     Rosario Blevins is a 59 y.o. female with Type 2 Diabetes Mellitus here for follow up.    Labs from March 24, 2021 show A1c is down to 7%  Labs from 5/28/2020 show HbA1c was 7.2%      She has a history of intolerance of Metformin because of diarrhea and she had recurrent yeast infections with an SGLT2 inhibitor specifically Farxiga    She is taking Lantus 30u daily, and Ozempic 1.0 mg once a week    She reports that she is happy that her sugars are better  In the past she had pseudohypoglycemia symptoms when her sugars were down to 100      She has no history of CAD  She has hyperlipidemia and is on generic Crestor 40mg daily  She denies symptoms of statin related myopathy  Her last LDL cholesterol was 87 with triglycerides of 137 on March 24, 2021      She has essential hypertension is taking lisinopril  She is tolerating her medication fairly well  She does not have diabetic nephropathy and last urine microalbumin was normal on 5/2020  We do not have an updated urine microalbumin on hand      We have her eye exam and she has no diabetic retinopathy on exam from 7/9/2020  She is only today that she is getting an eye exam next week        BG Diary:  Patient did not bring her BG meter    Weight has been stable    Diabetes Complications   Retinopathy: No known retinopathy.  Last eye exam: 7/9/2020 with Dr. Taylor  Neuropathy: Denies paresthesias or numbness in hands or feet. Denies any foot wounds.  Exercise: Minimal.  Diet: Fair.  Patient's medications, allergies, and social histories were reviewed and updated as appropriate.    ROS:     CONS:     No fever, no chills   EYES:     No diplopia, no blurry vision   CV:           No chest pain, no palpitations   PULM:     No SOB, no cough, no hemoptysis.   GI:            No nausea, no vomiting, no diarrhea, no constipation   ENDO:     No polyuria, no polydipsia, no heat intolerance,  no cold intolerance       Past Medical History:  Problem List:  2021: Achilles tendon pain  2020: Long-term insulin use ([facility])  2019: Left leg pain  2019: Influenza vaccination given  2018-10: Lipoma of back  2018-10: History of shingles  2018: Obesity (BMI 30-39.9)  2018: GERD (gastroesophageal reflux disease)  2013: Controlled type 2 diabetes mellitus without complication,   with long-term current use of insulin ([facility])  2013: HTN (hypertension)  2013: Dyslipidemia      Past Surgical History:  Past Surgical History:   Procedure Laterality Date   • CATARACT EXTRACTION WITH IOL Bilateral    • HYSTERECTOMY, TOTAL ABDOMINAL     • US-NEEDLE CORE BX-BREAST PANEL          Allergies:  Milk products food allergy and Codeine     Social History:  Social History     Tobacco Use   • Smoking status: Former Smoker     Types: Cigarettes     Quit date: 1991     Years since quittin.1   • Smokeless tobacco: Never Used   Substance Use Topics   • Alcohol use: No   • Drug use: No        Family History:   family history includes Cancer in her mother; Heart Attack (age of onset: 38) in her maternal uncle; Heart Attack (age of onset: 40) in her maternal uncle; Heart Attack (age of onset: 50) in her maternal grandmother; No Known Problems in her father; Other in her brother and sister; Other (age of onset: 47) in her mother.      PHYSICAL EXAM:   OBJECTIVE:  Vital signs: /76 (BP Location: Left arm, Patient Position: Sitting, BP Cuff Size: Adult)   Pulse 85   Ht 1.524 m (5')   Wt 78.7 kg (173 lb 9.6 oz)   SpO2 96%   BMI 33.90 kg/m²   GENERAL: Well-developed, well-nourished in no apparent distress.   EYE:  No ocular asymmetry, PERRLA  HENT: Pink, moist mucous membranes.    NECK: No thyromegaly.   CARDIOVASCULAR:  No murmurs  LUNGS: Clear breath sounds  ABDOMEN: Soft, nontender   EXTREMITIES: No clubbing, cyanosis, or edema.   NEUROLOGICAL: No gross focal motor abnormalities   LYMPH: No  cervical adenopathy palpated.   SKIN: No rashes, lesions.   Monofilament testing with a 10 gram force: sensation: intact bilaterally  Visual Inspection: Feet without maceration, ulcers, or fissures.  Pedal pulses: intact bilaterally    Labs:  Lab Results   Component Value Date/Time    HBA1C 7.0 (H) 03/24/2021 07:55 AM        Lab Results   Component Value Date/Time    WBC 6.6 05/12/2016 06:49 AM    RBC 5.17 05/12/2016 06:49 AM    HEMOGLOBIN 14.8 05/12/2016 06:49 AM    MCV 87.2 05/12/2016 06:49 AM    MCH 28.6 05/12/2016 06:49 AM    MCHC 32.8 (L) 05/12/2016 06:49 AM    RDW 41.7 05/12/2016 06:49 AM    MPV 11.0 05/12/2016 06:49 AM       Lab Results   Component Value Date/Time    SODIUM 138 09/10/2020 09:42 AM    POTASSIUM 4.1 09/10/2020 09:42 AM    CHLORIDE 100 09/10/2020 09:42 AM    CO2 25 09/10/2020 09:42 AM    ANION 13.0 09/10/2020 09:42 AM    GLUCOSE 107 (H) 09/10/2020 09:42 AM    BUN 12 09/10/2020 09:42 AM    CREATININE 0.41 (L) 09/10/2020 09:42 AM    CALCIUM 9.0 09/10/2020 09:42 AM    ASTSGOT 33 09/10/2020 09:42 AM    ALTSGPT 37 09/10/2020 09:42 AM    TBILIRUBIN 0.6 09/10/2020 09:42 AM    ALBUMIN 4.1 09/10/2020 09:42 AM    TOTPROTEIN 7.2 09/10/2020 09:42 AM    GLOBULIN 3.1 09/10/2020 09:42 AM    AGRATIO 1.3 09/10/2020 09:42 AM       Lab Results   Component Value Date/Time    CHOLSTRLTOT 218 (H) 05/28/2020 1001    TRIGLYCERIDE 276 (H) 05/28/2020 1001    HDL 42 05/28/2020 1001     (H) 05/28/2020 1001       Lab Results   Component Value Date/Time    MALBCRT see below 05/28/2020 10:01 AM    MICROALBUR <1.2 05/28/2020 10:01 AM        Lab Results   Component Value Date/Time    TSHROBERTO 1.580 05/12/2016 0649     No results found for: FREEDIR  No results found for: FREET3  No results found for: THYSTIMIG        ASSESSMENT/PLAN:     1. Controlled type 2 diabetes mellitus without complication, with long-term current use of insulin (HCC)  A1c is better at 7%  Continue Lantus 30 units daily  Continue Ozempic 1.0mg  weekly  Recommend that she watch her carb intake  Reviewed plate method of eating  Recommend regular exercise  I will see her again in 6 months repeat of her A1c I recommend that she get a copy of her eye exam    2. Dyslipidemia  Improved control  Continue Crestor   repeat fasting lipids in 12 months    3. Long-term insulin use (HCC)  Patient is on long-term basal insulin therapy for type 2 diabetes      Return in about 6 months (around 10/14/2021).      Thank you kindly for allowing me to participate in the diabetes care plan for this patient.    Eris Almonte MD, FACE, [facility]  06/24/20    CC:   Yaya Frank P.A.-C.

## 2021-04-15 ENCOUNTER — IMMUNIZATION (OUTPATIENT)
Dept: FAMILY PLANNING/WOMEN'S HEALTH CLINIC | Facility: IMMUNIZATION CENTER | Age: 61
End: 2021-04-15
Attending: INTERNAL MEDICINE
Payer: COMMERCIAL

## 2021-04-15 DIAGNOSIS — Z23 ENCOUNTER FOR VACCINATION: Primary | ICD-10-CM

## 2021-04-15 PROCEDURE — 91300 PFIZER SARS-COV-2 VACCINE: CPT

## 2021-04-15 PROCEDURE — 0001A PFIZER SARS-COV-2 VACCINE: CPT

## 2021-06-06 RX ORDER — ESTRADIOL 1 MG/1
TABLET ORAL
Qty: 90 TABLET | Refills: 0 | Status: SHIPPED | OUTPATIENT
Start: 2021-06-06 | End: 2021-08-30

## 2021-06-23 ENCOUNTER — HOSPITAL ENCOUNTER (OUTPATIENT)
Dept: RADIOLOGY | Facility: MEDICAL CENTER | Age: 61
End: 2021-06-23
Attending: PHYSICIAN ASSISTANT
Payer: COMMERCIAL

## 2021-06-23 DIAGNOSIS — Z12.31 ENCOUNTER FOR SCREENING MAMMOGRAM FOR BREAST CANCER: ICD-10-CM

## 2021-06-23 PROCEDURE — 77063 BREAST TOMOSYNTHESIS BI: CPT

## 2021-07-02 DIAGNOSIS — Z79.4 CONTROLLED TYPE 2 DIABETES MELLITUS WITHOUT COMPLICATION, WITH LONG-TERM CURRENT USE OF INSULIN (HCC): ICD-10-CM

## 2021-07-02 DIAGNOSIS — E11.9 CONTROLLED TYPE 2 DIABETES MELLITUS WITHOUT COMPLICATION, WITH LONG-TERM CURRENT USE OF INSULIN (HCC): ICD-10-CM

## 2021-07-06 RX ORDER — SEMAGLUTIDE 1.34 MG/ML
1 INJECTION, SOLUTION SUBCUTANEOUS
Qty: 3 ML | Refills: 11 | Status: SHIPPED
Start: 2021-07-06 | End: 2021-10-06

## 2021-07-06 RX ORDER — SEMAGLUTIDE 1.34 MG/ML
INJECTION, SOLUTION SUBCUTANEOUS
Qty: 3 ML | Refills: 0 | Status: SHIPPED
Start: 2021-07-06 | End: 2021-10-06

## 2021-07-06 NOTE — TELEPHONE ENCOUNTER
Received request via: Pharmacy    Was the patient seen in the last year in this department? Yes    Does the patient have an active prescription (recently filled or refills available) for medication(s) requested? No       REQUESTED MEDICATION:   Requested Prescriptions     Pending Prescriptions Disp Refills   • Semaglutide, 1 MG/DOSE, (OZEMPIC, 1 MG/DOSE,) 2 MG/1.5ML Solution Pen-injector 3 mL 11     Sig: Inject 1 mg under the skin every 7 days.

## 2021-07-06 NOTE — TELEPHONE ENCOUNTER
Received request via: Pharmacy    Was the patient seen in the last year in this department? Yes    Does the patient have an active prescription (recently filled or refills available) for medication(s) requested? No      REQUESTED MEDICATION:   Requested Prescriptions     Pending Prescriptions Disp Refills   • OZEMPIC, 1 MG/DOSE, 4 MG/3ML Solution Pen-injector [Pharmacy Med Name: Ozempic (1 MG/DOSE) Subcutaneous Solution Pen-injector 4 MG/3ML] 3 mL 0     Sig: INJECT 1MG AS DIRECTED EVERY 7 DAYS

## 2021-07-30 ENCOUNTER — OFFICE VISIT (OUTPATIENT)
Dept: MEDICAL GROUP | Facility: MEDICAL CENTER | Age: 61
End: 2021-07-30
Payer: COMMERCIAL

## 2021-07-30 VITALS
BODY MASS INDEX: 34 KG/M2 | TEMPERATURE: 98.8 F | HEART RATE: 100 BPM | OXYGEN SATURATION: 98 % | DIASTOLIC BLOOD PRESSURE: 72 MMHG | SYSTOLIC BLOOD PRESSURE: 122 MMHG | WEIGHT: 173.2 LBS | HEIGHT: 60 IN

## 2021-07-30 DIAGNOSIS — J01.40 ACUTE NON-RECURRENT PANSINUSITIS: ICD-10-CM

## 2021-07-30 PROCEDURE — 99214 OFFICE O/P EST MOD 30 MIN: CPT | Performed by: PHYSICIAN ASSISTANT

## 2021-07-30 RX ORDER — AMOXICILLIN 875 MG/1
875 TABLET, COATED ORAL 2 TIMES DAILY
Qty: 14 TABLET | Refills: 0 | Status: SHIPPED | OUTPATIENT
Start: 2021-07-30 | End: 2021-08-06

## 2021-07-30 NOTE — PROGRESS NOTES
Subjective:   Rosario Blevins is a 60 y.o. female here today for cold symptoms for approximately 1 week.    Acute pansinusitis  This is a pleasant 60-year-old female complains of a 1 week history of sinus congestion and drainage. Fatigue. Feeling feverish. Some coughing. Ear pain worse on the left side. Her father had similar symptoms and tested negative for Covid. She is unsure what she can take being a diabetic. She is taking Sudafed but he has some side effects and also may raise her BP. She denies any shortness of breath. No chest pain.       Current medicines (including changes today)  Current Outpatient Medications   Medication Sig Dispense Refill   • amoxicillin (AMOXIL) 875 MG tablet Take 1 tablet by mouth 2 times a day for 7 days. 14 tablet 0   • OZEMPIC, 1 MG/DOSE, 4 MG/3ML Solution Pen-injector INJECT 1MG AS DIRECTED EVERY 7 DAYS 3 mL 0   • Semaglutide, 1 MG/DOSE, (OZEMPIC, 1 MG/DOSE,) 2 MG/1.5ML Solution Pen-injector Inject 1 mg under the skin every 7 days. 3 mL 11   • estradiol (ESTRACE) 1 MG Tab TAKE ONE TABLET BY MOUTH ONE TIME DAILY  90 tablet 0   • insulin glargine (LANTUS SOLOSTAR) 100 UNIT/ML Solution Pen-injector injection Inject 40 Units under the skin every evening. 5 Each 11   • rosuvastatin (CRESTOR) 40 MG tablet TAKE ONE TABLET BY MOUTH ONE TIME DAILY 90 tablet 2   • omeprazole (PRILOSEC) 40 MG delayed-release capsule TAKE ONE CAPSULE BY MOUTH ONE TIME DAILY 90 capsule 2   • lisinopril (PRINIVIL) 20 MG Tab Take 1 tablet by mouth every day. 90 tablet 2   • amLODIPine (NORVASC) 5 MG Tab Take 1 tablet by mouth every day. 90 tablet 1   • ibuprofen (MOTRIN) 800 MG Tab TAKE ONE TABLET BY MOUTH EVERY EIGHT HOURS AS NEEDED  60 Tab 1   • Multiple Vitamins-Minerals (CENTRUM SILVER PO) Take  by mouth every day.       No current facility-administered medications for this visit.     She  has a past medical history of Diabetes, Hyperlipidemia, and Hypertension.    Social History and Family History  were reviewed and updated.    ROS   No chest pain, no shortness of breath, no abdominal pain and all other systems were reviewed and are negative.       Objective:     /72   Pulse 100   Temp 37.1 °C (98.8 °F) (Temporal)   Ht 1.524 m (5')   Wt 78.6 kg (173 lb 3.2 oz)   SpO2 98%  Body mass index is 33.83 kg/m².   Physical Exam:  Constitutional: Alert, no distress.  Skin: Warm, dry, good turgor, no rashes in visible areas.  Eye: Equal, round and reactive, conjunctiva clear, lids normal.  ENMT: Lips without lesions, good dentition, oropharynx clear. Bilateral TMs are clear.  Neck: Trachea midline, no masses.   Lymph: No cervical or supraclavicular lymphadenopathy  Respiratory: Unlabored respiratory effort, lungs clear to auscultation, no wheezes, no ronchi.  Cardiovascular: Normal S1, S2, no murmur, no edema.  Psych: Alert and oriented x3, normal affect and mood.        Assessment and Plan:   The following treatment plan was discussed    1. Acute non-recurrent pansinusitis  Acute, new onset condition. Discussed likely viral URI. Push fluids. Take ibuprofen or Tylenol as needed. May take over-the-counter cold medications including DayQuil and NyQuil. Consider tablet form products. Will provide amoxicillin for her sinusitis. Advise though the antibiotics are used for bacterial infections. The antibiotic may help clear up her sinus congestion. Also provided a work note to miss work tomorrow. Expect her symptoms to gradually resolve over the next week. Follow-up with the ER or contact me with any worsening concerns such as fever, shortness of breath or chest pain.  - amoxicillin (AMOXIL) 875 MG tablet; Take 1 tablet by mouth 2 times a day for 7 days.  Dispense: 14 tablet; Refill: 0         Followup: Return in about 3 months (around 10/30/2021), or if symptoms worsen or fail to improve.    Please note that this dictation was created using voice recognition software. I have made every reasonable attempt to correct  obvious errors, but I expect that there are errors of grammar and possibly content that I did not discover before finalizing the note.

## 2021-07-30 NOTE — ASSESSMENT & PLAN NOTE
This is a pleasant 60-year-old female complains of a 1 week history of sinus congestion and drainage. Fatigue. Feeling feverish. Some coughing. Ear pain worse on the left side. Her father had similar symptoms and tested negative for Covid. She is unsure what she can take being a diabetic. She is taking Sudafed but he has some side effects and also may raise her BP. She denies any shortness of breath. No chest pain.

## 2021-07-30 NOTE — LETTER
July 30, 2021         Patient: Rosario Blevins   YOB: 1960   Date of Visit: 7/30/2021           To Whom it May Concern:    Rosario Blevins was seen in my clinic on 7/30/2021. She may return to work on 8/1/2021.    If you have any questions or concerns, please don't hesitate to call.        Sincerely,           Yaya Frank P.A.-C.  Electronically Signed

## 2021-08-30 RX ORDER — ESTRADIOL 1 MG/1
TABLET ORAL
Qty: 90 TABLET | Refills: 0 | Status: SHIPPED | OUTPATIENT
Start: 2021-08-30 | End: 2021-11-28

## 2021-08-30 NOTE — TELEPHONE ENCOUNTER
Received request via: Pharmacy    Was the patient seen in the last year in this department? Yes 7/30/21    Does the patient have an active prescription (recently filled or refills available) for medication(s) requested? No

## 2021-09-27 DIAGNOSIS — E11.9 CONTROLLED TYPE 2 DIABETES MELLITUS WITHOUT COMPLICATION, WITH LONG-TERM CURRENT USE OF INSULIN (HCC): ICD-10-CM

## 2021-09-27 DIAGNOSIS — Z79.4 CONTROLLED TYPE 2 DIABETES MELLITUS WITHOUT COMPLICATION, WITH LONG-TERM CURRENT USE OF INSULIN (HCC): ICD-10-CM

## 2021-10-06 ENCOUNTER — TELEPHONE (OUTPATIENT)
Dept: MEDICAL GROUP | Facility: MEDICAL CENTER | Age: 61
End: 2021-10-06

## 2021-10-06 ENCOUNTER — OFFICE VISIT (OUTPATIENT)
Dept: ENDOCRINOLOGY | Facility: MEDICAL CENTER | Age: 61
End: 2021-10-06
Attending: INTERNAL MEDICINE
Payer: COMMERCIAL

## 2021-10-06 VITALS
HEART RATE: 90 BPM | DIASTOLIC BLOOD PRESSURE: 80 MMHG | BODY MASS INDEX: 32.98 KG/M2 | OXYGEN SATURATION: 99 % | WEIGHT: 168 LBS | HEIGHT: 60 IN | SYSTOLIC BLOOD PRESSURE: 132 MMHG

## 2021-10-06 DIAGNOSIS — E11.9 CONTROLLED TYPE 2 DIABETES MELLITUS WITHOUT COMPLICATION, WITH LONG-TERM CURRENT USE OF INSULIN (HCC): ICD-10-CM

## 2021-10-06 DIAGNOSIS — E78.5 DYSLIPIDEMIA: ICD-10-CM

## 2021-10-06 DIAGNOSIS — Z79.4 LONG-TERM INSULIN USE (HCC): ICD-10-CM

## 2021-10-06 DIAGNOSIS — Z79.4 CONTROLLED TYPE 2 DIABETES MELLITUS WITHOUT COMPLICATION, WITH LONG-TERM CURRENT USE OF INSULIN (HCC): ICD-10-CM

## 2021-10-06 LAB
HBA1C MFR BLD: 6.8 % (ref 0–5.6)
INT CON NEG: NEGATIVE
INT CON POS: POSITIVE

## 2021-10-06 PROCEDURE — 99212 OFFICE O/P EST SF 10 MIN: CPT | Performed by: INTERNAL MEDICINE

## 2021-10-06 PROCEDURE — 99214 OFFICE O/P EST MOD 30 MIN: CPT | Performed by: INTERNAL MEDICINE

## 2021-10-06 PROCEDURE — 83036 HEMOGLOBIN GLYCOSYLATED A1C: CPT | Performed by: INTERNAL MEDICINE

## 2021-10-06 RX ORDER — SEMAGLUTIDE 1.34 MG/ML
1 INJECTION, SOLUTION SUBCUTANEOUS
Qty: 3 ML | Refills: 11 | Status: SHIPPED | OUTPATIENT
Start: 2021-10-06 | End: 2021-10-06 | Stop reason: SDUPTHER

## 2021-10-06 RX ORDER — INSULIN GLARGINE 100 [IU]/ML
40 INJECTION, SOLUTION SUBCUTANEOUS EVERY EVENING
Qty: 45 ML | Refills: 3 | Status: SHIPPED | OUTPATIENT
Start: 2021-10-06 | End: 2021-10-20 | Stop reason: SDUPTHER

## 2021-10-06 RX ORDER — SEMAGLUTIDE 1.34 MG/ML
1 INJECTION, SOLUTION SUBCUTANEOUS
Qty: 9 ML | Refills: 3 | Status: SHIPPED | OUTPATIENT
Start: 2021-10-06 | End: 2021-10-20 | Stop reason: SDUPTHER

## 2021-10-06 NOTE — TELEPHONE ENCOUNTER
1. Name: Rosario Blevins      Call Back Number: [phone] (home)         How would the patient prefer to be contacted with a response: phone    Pt came in person and said she has upcoming appt on 10/20/2021. Wants to go get blood work done and wants to know if Yaya can send to lab so she can have those done.     Thank you

## 2021-10-06 NOTE — PROGRESS NOTES
CHIEF COMPLAINT: Patient is here for follow up of Type 2 Diabetes Mellitus    HPI:     Rosario Blevins is a 59 y.o. female with Type 2 Diabetes Mellitus here for follow up.    Labs from October 6, 2021 show A1c is 6.8%  Labs from March 24, 2021 show A1c was 7%  Labs from 5/28/2020 show A1c was 7.2%      She has a history of intolerance of Metformin because of diarrhea and she had recurrent yeast infections with an SGLT2 inhibitor specifically Farxiga        She is taking Lantus 30u daily, and Ozempic 1.0 mg once a week    She reports progressive weight loss with Ozempic  She denies SE with her meds      She has no history of CAD  She has hyperlipidemia and is on generic Crestor 40mg daily  She denies symptoms of statin related myopathy  Her last LDL cholesterol was 87 with triglycerides of 137 on March 24, 2021      She has essential hypertension is taking lisinopril  She is tolerating her medication fairly well  She does not have diabetic nephropathy   Her last urine microalbumin was normal on 5/2020  We do not have an updated urine microalbumin on hand      We have her eye exam and she has no diabetic retinopathy   Eye exam was on 4/2021 by Dr. Taylor        BG Diary:  Patient did not bring her BG meter    Weight has decreased 2-4 pounds over last 3 months    Diabetes Complications   Retinopathy: No known retinopathy.  Last eye exam: 4/2021 with Dr. Taylor  Neuropathy: Denies paresthesias or numbness in hands or feet. Denies any foot wounds.  Exercise: Minimal.  Diet: Fair.  Patient's medications, allergies, and social histories were reviewed and updated as appropriate.    ROS:     CONS:     No fever, no chills   EYES:     No diplopia, no blurry vision   CV:           No chest pain, no palpitations   PULM:     No SOB, no cough, no hemoptysis.   GI:            No nausea, no vomiting, no diarrhea, no constipation   ENDO:     No polyuria, no polydipsia, no heat intolerance, no cold intolerance       Past Medical  History:  Problem List:  2021: Acute pansinusitis  2021: Achilles tendon pain  2020: Long-term insulin use ([facility])  2019: Left leg pain  2019: Influenza vaccination given  2018-10: Lipoma of back  2018-10: History of shingles  2018: Obesity (BMI 30-39.9)  2018: GERD (gastroesophageal reflux disease)  2013: Controlled type 2 diabetes mellitus without complication,   with long-term current use of insulin ([facility])  2013: HTN (hypertension)  2013: Dyslipidemia      Past Surgical History:  Past Surgical History:   Procedure Laterality Date   • CATARACT EXTRACTION WITH IOL Bilateral    • HYSTERECTOMY, TOTAL ABDOMINAL     • US-NEEDLE CORE BX-BREAST PANEL          Allergies:  Milk products food allergy and Codeine     Social History:  Social History     Tobacco Use   • Smoking status: Former Smoker     Types: Cigarettes     Quit date: 1991     Years since quittin.6   • Smokeless tobacco: Never Used   Vaping Use   • Vaping Use: Never used   Substance Use Topics   • Alcohol use: No   • Drug use: No        Family History:   family history includes Cancer in her mother; Heart Attack (age of onset: 38) in her maternal uncle; Heart Attack (age of onset: 40) in her maternal uncle; Heart Attack (age of onset: 50) in her maternal grandmother; No Known Problems in her father; Other in her brother and sister; Other (age of onset: 47) in her mother.      PHYSICAL EXAM:   OBJECTIVE:  Vital signs: /80 (BP Location: Left arm, Patient Position: Sitting, BP Cuff Size: Adult)   Pulse 90   Ht 1.524 m (5')   Wt 76.2 kg (168 lb)   SpO2 99%   BMI 32.81 kg/m²   GENERAL: Well-developed, well-nourished in no apparent distress.   EYE:  No ocular asymmetry, PERRLA  HENT: Pink, moist mucous membranes.    NECK: No thyromegaly.   CARDIOVASCULAR:  No murmurs  LUNGS: Clear breath sounds  ABDOMEN: Soft, nontender   EXTREMITIES: No clubbing, cyanosis, or edema.   NEUROLOGICAL: No gross focal motor  abnormalities   LYMPH: No cervical adenopathy palpated.   SKIN: No rashes, she has a palpable soft, movable, lipoma on her upper back near the midline    Labs:  Lab Results   Component Value Date/Time    HBA1C 7.0 (H) 03/24/2021 07:55 AM        Lab Results   Component Value Date/Time    WBC 6.6 05/12/2016 06:49 AM    RBC 5.17 05/12/2016 06:49 AM    HEMOGLOBIN 14.8 05/12/2016 06:49 AM    MCV 87.2 05/12/2016 06:49 AM    MCH 28.6 05/12/2016 06:49 AM    MCHC 32.8 (L) 05/12/2016 06:49 AM    RDW 41.7 05/12/2016 06:49 AM    MPV 11.0 05/12/2016 06:49 AM       Lab Results   Component Value Date/Time    SODIUM 138 09/10/2020 09:42 AM    POTASSIUM 4.1 09/10/2020 09:42 AM    CHLORIDE 100 09/10/2020 09:42 AM    CO2 25 09/10/2020 09:42 AM    ANION 13.0 09/10/2020 09:42 AM    GLUCOSE 107 (H) 09/10/2020 09:42 AM    BUN 12 09/10/2020 09:42 AM    CREATININE 0.41 (L) 09/10/2020 09:42 AM    CALCIUM 9.0 09/10/2020 09:42 AM    ASTSGOT 33 09/10/2020 09:42 AM    ALTSGPT 37 09/10/2020 09:42 AM    TBILIRUBIN 0.6 09/10/2020 09:42 AM    ALBUMIN 4.1 09/10/2020 09:42 AM    TOTPROTEIN 7.2 09/10/2020 09:42 AM    GLOBULIN 3.1 09/10/2020 09:42 AM    AGRATIO 1.3 09/10/2020 09:42 AM       Lab Results   Component Value Date/Time    CHOLSTRLTOT 218 (H) 05/28/2020 1001    TRIGLYCERIDE 276 (H) 05/28/2020 1001    HDL 42 05/28/2020 1001     (H) 05/28/2020 1001       Lab Results   Component Value Date/Time    MALBCRT see below 05/28/2020 10:01 AM    MICROALBUR <1.2 05/28/2020 10:01 AM        Lab Results   Component Value Date/Time    TSHULTRASEN 1.580 05/12/2016 0649     No results found for: FREEDIR  No results found for: FREET3  No results found for: THYSTIMIG        ASSESSMENT/PLAN:     1. Controlled type 2 diabetes mellitus without complication, with long-term current use of insulin (HCC)  Controlled  A1c is 6.8%  Continue Lantus 30 units daily  Continue Ozempic 1.0mg weekly  Reminded patient to bring glucose meter  I am changing her prescriptions  to [facility] to help her save money  I recommend that she complete a urine microalbumin today to complete her required annual labs  We have copies of her eye exam  She is up-to-date with her foot exam  Recommend follow-up in 6 months    2. Dyslipidemia  Stable  Continue Crestor   Repeat fasting lipids next year    3. Long-term insulin use (HCC)  Patient is on long-term basal insulin therapy for type 2 diabetes      Return in about 6 months (around 4/6/2022).      Thank you kindly for allowing me to participate in the diabetes care plan for this patient.    Eris Almonte MD, F[facility], [facility]  06/24/20    CC:   Yaya Frank P.A.-C.

## 2021-10-14 ENCOUNTER — TELEPHONE (OUTPATIENT)
Dept: MEDICAL GROUP | Facility: MEDICAL CENTER | Age: 61
End: 2021-10-14

## 2021-10-14 ENCOUNTER — HOSPITAL ENCOUNTER (OUTPATIENT)
Dept: LAB | Facility: MEDICAL CENTER | Age: 61
End: 2021-10-14
Attending: INTERNAL MEDICINE
Payer: COMMERCIAL

## 2021-10-14 DIAGNOSIS — Z79.4 CONTROLLED TYPE 2 DIABETES MELLITUS WITHOUT COMPLICATION, WITH LONG-TERM CURRENT USE OF INSULIN (HCC): ICD-10-CM

## 2021-10-14 DIAGNOSIS — Z79.4 LONG-TERM INSULIN USE (HCC): ICD-10-CM

## 2021-10-14 DIAGNOSIS — E11.9 CONTROLLED TYPE 2 DIABETES MELLITUS WITHOUT COMPLICATION, WITH LONG-TERM CURRENT USE OF INSULIN (HCC): ICD-10-CM

## 2021-10-14 DIAGNOSIS — E78.5 DYSLIPIDEMIA: ICD-10-CM

## 2021-10-14 LAB
ALBUMIN SERPL BCP-MCNC: 4.4 G/DL (ref 3.2–4.9)
ALBUMIN/GLOB SERPL: 1.3 G/DL
ALP SERPL-CCNC: 53 U/L (ref 30–99)
ALT SERPL-CCNC: 40 U/L (ref 2–50)
ANION GAP SERPL CALC-SCNC: 10 MMOL/L (ref 7–16)
AST SERPL-CCNC: 34 U/L (ref 12–45)
BILIRUB SERPL-MCNC: 0.5 MG/DL (ref 0.1–1.5)
BUN SERPL-MCNC: 15 MG/DL (ref 8–22)
CALCIUM SERPL-MCNC: 9.6 MG/DL (ref 8.4–10.2)
CHLORIDE SERPL-SCNC: 99 MMOL/L (ref 96–112)
CO2 SERPL-SCNC: 26 MMOL/L (ref 20–33)
CREAT SERPL-MCNC: 0.52 MG/DL (ref 0.5–1.4)
CREAT UR-MCNC: 33.21 MG/DL
FASTING STATUS PATIENT QL REPORTED: NORMAL
GLOBULIN SER CALC-MCNC: 3.3 G/DL (ref 1.9–3.5)
GLUCOSE SERPL-MCNC: 139 MG/DL (ref 65–99)
MICROALBUMIN UR-MCNC: <1.2 MG/DL
MICROALBUMIN/CREAT UR: NORMAL MG/G (ref 0–30)
POTASSIUM SERPL-SCNC: 4.7 MMOL/L (ref 3.6–5.5)
PROT SERPL-MCNC: 7.7 G/DL (ref 6–8.2)
SODIUM SERPL-SCNC: 135 MMOL/L (ref 135–145)

## 2021-10-14 PROCEDURE — 82043 UR ALBUMIN QUANTITATIVE: CPT

## 2021-10-14 PROCEDURE — 36415 COLL VENOUS BLD VENIPUNCTURE: CPT

## 2021-10-14 PROCEDURE — 80053 COMPREHEN METABOLIC PANEL: CPT

## 2021-10-14 PROCEDURE — 82570 ASSAY OF URINE CREATININE: CPT

## 2021-10-14 NOTE — TELEPHONE ENCOUNTER
1. Name: Rosario Blevins      Call Back Number: [phone] (home)         How would the patient prefer to be contacted with a response: phone    Pt Rosario came in person and asked if Yaya had put in lab work for her advised just Dr Almonte. She said she would go to do Lab work and she needed to know why Yaya didn't add any for her. Please call her with response.     Thank you

## 2021-10-20 ENCOUNTER — OFFICE VISIT (OUTPATIENT)
Dept: MEDICAL GROUP | Facility: MEDICAL CENTER | Age: 61
End: 2021-10-20
Payer: COMMERCIAL

## 2021-10-20 VITALS
OXYGEN SATURATION: 97 % | DIASTOLIC BLOOD PRESSURE: 70 MMHG | HEART RATE: 90 BPM | BODY MASS INDEX: 33.96 KG/M2 | HEIGHT: 60 IN | SYSTOLIC BLOOD PRESSURE: 136 MMHG | TEMPERATURE: 98.3 F | WEIGHT: 173 LBS

## 2021-10-20 DIAGNOSIS — Z23 INFLUENZA VACCINE NEEDED: ICD-10-CM

## 2021-10-20 DIAGNOSIS — Z79.4 CONTROLLED TYPE 2 DIABETES MELLITUS WITHOUT COMPLICATION, WITH LONG-TERM CURRENT USE OF INSULIN (HCC): ICD-10-CM

## 2021-10-20 DIAGNOSIS — E11.9 CONTROLLED TYPE 2 DIABETES MELLITUS WITHOUT COMPLICATION, WITH LONG-TERM CURRENT USE OF INSULIN (HCC): ICD-10-CM

## 2021-10-20 PROCEDURE — 90686 IIV4 VACC NO PRSV 0.5 ML IM: CPT | Performed by: PHYSICIAN ASSISTANT

## 2021-10-20 PROCEDURE — 90471 IMMUNIZATION ADMIN: CPT | Performed by: PHYSICIAN ASSISTANT

## 2021-10-20 PROCEDURE — 99214 OFFICE O/P EST MOD 30 MIN: CPT | Mod: 25 | Performed by: PHYSICIAN ASSISTANT

## 2021-10-20 RX ORDER — INSULIN GLARGINE 100 [IU]/ML
40 INJECTION, SOLUTION SUBCUTANEOUS EVERY EVENING
Qty: 45 ML | Refills: 3 | Status: SHIPPED | OUTPATIENT
Start: 2021-10-20 | End: 2022-01-21 | Stop reason: SDUPTHER

## 2021-10-20 RX ORDER — SEMAGLUTIDE 1.34 MG/ML
1 INJECTION, SOLUTION SUBCUTANEOUS
Qty: 9 ML | Refills: 3 | Status: SHIPPED | OUTPATIENT
Start: 2021-10-20 | End: 2022-07-29 | Stop reason: SDUPTHER

## 2021-10-20 NOTE — PROGRESS NOTES
AndSubjective:   Rosario Blevins is a 60 y.o. female here today for controlled diabetes and dyslipidemia.    Controlled type 2 diabetes mellitus without complication, with long-term current use of insulin (HCC)  This is a pleasant 60-year-old female here today to follow-up on her health.  Recently had labs done and unfortunately they did not order her cholesterol or thyroid labs.  She is disappointed because she was fasting for 12 hours.  She does take Crestor 40 mg and her cholesterol profile has been in normal range.  She has no history of any thyroid condition.  Her last TSH was in normal range which was over a year ago.  Overall she is doing well.  She is requesting her influenza vaccination.  Wants to see if she is eligible for her Pfizer Covid booster vaccine.       Current medicines (including changes today)  Current Outpatient Medications   Medication Sig Dispense Refill   • insulin glargine (LANTUS SOLOSTAR) 100 UNIT/ML Solution Pen-injector injection Inject 40 Units under the skin every evening. 45 mL 3   • Semaglutide, 1 MG/DOSE, (OZEMPIC, 1 MG/DOSE,) 4 MG/3ML Solution Pen-injector Inject 1 mg under the skin every 7 days. 9 mL 3   • estradiol (ESTRACE) 1 MG Tab TAKE ONE TABLET BY MOUTH ONE TIME DAILY 90 Tablet 0   • amLODIPine (NORVASC) 5 MG Tab TAKE ONE TABLET BY MOUTH ONE TIME DAILY 90 Tablet 2   • rosuvastatin (CRESTOR) 40 MG tablet TAKE ONE TABLET BY MOUTH ONE TIME DAILY 90 tablet 2   • omeprazole (PRILOSEC) 40 MG delayed-release capsule TAKE ONE CAPSULE BY MOUTH ONE TIME DAILY 90 capsule 2   • lisinopril (PRINIVIL) 20 MG Tab Take 1 tablet by mouth every day. 90 tablet 2   • ibuprofen (MOTRIN) 800 MG Tab TAKE ONE TABLET BY MOUTH EVERY EIGHT HOURS AS NEEDED  60 Tab 1   • Multiple Vitamins-Minerals (CENTRUM SILVER PO) Take  by mouth every day.       No current facility-administered medications for this visit.     She  has a past medical history of Diabetes, Hyperlipidemia, and  Hypertension.    Social History and Family History were reviewed and updated.    ROS   No chest pain, no shortness of breath, no abdominal pain and all other systems were reviewed and are negative.       Objective:     /70 (BP Location: Right arm, Patient Position: Sitting, BP Cuff Size: Adult)   Pulse 90   Temp 36.8 °C (98.3 °F) (Temporal)   Ht 1.524 m (5')   Wt 78.5 kg (173 lb)   SpO2 97%  Body mass index is 33.79 kg/m².   Physical Exam:  Constitutional: Alert, no distress.  Skin: Warm, dry, good turgor, no rashes in visible areas.  Eye: Equal, round and reactive, conjunctiva clear, lids normal.  ENMT: Lips without lesions, good dentition, oropharynx clear.  Neck: Trachea midline, no masses.   Lymph: No cervical or supraclavicular lymphadenopathy  Respiratory: Unlabored respiratory effort.  Psych: Alert and oriented x3, normal affect and mood.        Assessment and Plan:   The following treatment plan was discussed    1. Controlled type 2 diabetes mellitus without complication, with long-term current use of insulin ([facility])  Chronic condition.  Stable.  Controlled.  Follow with endocrinology.  Renewed Lantus and Ozempic.  Sent to [facility].  Unable to be prescribed through [facility].  Reviewed her labs.  Will defer cholesterol profile until next spring.  Cholesterol has been well controlled and she is on a high dose of Crestor.  - insulin glargine (LANTUS SOLOSTAR) 100 UNIT/ML Solution Pen-injector injection; Inject 40 Units under the skin every evening.  Dispense: 45 mL; Refill: 3  - Semaglutide, 1 MG/DOSE, (OZEMPIC, 1 MG/DOSE,) 4 MG/3ML Solution Pen-injector; Inject 1 mg under the skin every 7 days.  Dispense: 9 mL; Refill: 3    2. Influenza vaccine needed  Administer without complaints.  Advised to follow-up at [facility] or [facility] for her booster Covid vaccination.  She is due for that.  Discussed possible side effects.  - INFLUENZA VACCINE QUAD INJ (PF)         Followup: Return in about 6 months (around  4/20/2022), or if symptoms worsen or fail to improve.    Please note that this dictation was created using voice recognition software. I have made every reasonable attempt to correct obvious errors, but I expect that there are errors of grammar and possibly content that I did not discover before finalizing the note.

## 2021-10-20 NOTE — ASSESSMENT & PLAN NOTE
This is a pleasant 60-year-old female here today to follow-up on her health.  Recently had labs done and unfortunately they did not order her cholesterol or thyroid labs.  She is disappointed because she was fasting for 12 hours.  She does take Crestor 40 mg and her cholesterol profile has been in normal range.  She has no history of any thyroid condition.  Her last TSH was in normal range which was over a year ago.  Overall she is doing well.  She is requesting her influenza vaccination.  Wants to see if she is eligible for her Pfizer Covid booster vaccine.

## 2021-11-28 RX ORDER — ESTRADIOL 1 MG/1
TABLET ORAL
Qty: 90 TABLET | Refills: 0 | Status: SHIPPED | OUTPATIENT
Start: 2021-11-28 | End: 2022-03-03

## 2021-11-28 RX ORDER — IBUPROFEN 800 MG/1
TABLET ORAL
Qty: 60 TABLET | Refills: 0 | Status: SHIPPED | OUTPATIENT
Start: 2021-11-28 | End: 2021-11-29

## 2021-11-29 DIAGNOSIS — I10 ESSENTIAL HYPERTENSION: ICD-10-CM

## 2021-11-29 DIAGNOSIS — K21.9 GASTROESOPHAGEAL REFLUX DISEASE WITHOUT ESOPHAGITIS: ICD-10-CM

## 2021-11-29 RX ORDER — IBUPROFEN 800 MG/1
TABLET ORAL
Qty: 60 TABLET | Refills: 0 | Status: SHIPPED | OUTPATIENT
Start: 2021-11-29 | End: 2022-04-13 | Stop reason: SDUPTHER

## 2021-11-29 RX ORDER — LISINOPRIL 20 MG/1
TABLET ORAL
Qty: 90 TABLET | Refills: 0 | Status: SHIPPED | OUTPATIENT
Start: 2021-11-29 | End: 2022-02-23

## 2021-11-29 RX ORDER — OMEPRAZOLE 40 MG/1
CAPSULE, DELAYED RELEASE ORAL
Qty: 90 CAPSULE | Refills: 0 | Status: SHIPPED | OUTPATIENT
Start: 2021-11-29 | End: 2022-04-11

## 2021-11-29 NOTE — TELEPHONE ENCOUNTER
Received request via: Pharmacy    Was the patient seen in the last year in this department? Yes    Does the patient have an active prescription (recently filled or refills available) for medication(s) requested? No     Requested Prescriptions     Pending Prescriptions Disp Refills   • lisinopril (PRINIVIL) 20 MG Tab [Pharmacy Med Name: Lisinopril Oral Tablet 20 MG] 90 Tablet 0     Sig: TAKE ONE TABLET BY MOUTH ONE TIME DAILY   • omeprazole (PRILOSEC) 40 MG delayed-release capsule [Pharmacy Med Name: Omeprazole Oral Capsule Delayed Release 40 MG] 90 Capsule 0     Sig: TAKE ONE CAPSULE BY MOUTH ONE TIME DAILY   • ibuprofen (MOTRIN) 800 MG Tab [Pharmacy Med Name: Ibuprofen Oral Tablet 800 MG] 60 Tablet 0     Sig: TAKE ONE TABLET BY MOUTH EVERY EIGHT HOURS AS NEEDED

## 2022-01-21 DIAGNOSIS — Z79.4 CONTROLLED TYPE 2 DIABETES MELLITUS WITHOUT COMPLICATION, WITH LONG-TERM CURRENT USE OF INSULIN (HCC): ICD-10-CM

## 2022-01-21 DIAGNOSIS — E11.9 CONTROLLED TYPE 2 DIABETES MELLITUS WITHOUT COMPLICATION, WITH LONG-TERM CURRENT USE OF INSULIN (HCC): ICD-10-CM

## 2022-01-21 RX ORDER — INSULIN GLARGINE 100 [IU]/ML
40 INJECTION, SOLUTION SUBCUTANEOUS EVERY EVENING
Qty: 45 ML | Refills: 3 | Status: SHIPPED
Start: 2022-01-21 | End: 2022-01-24

## 2022-01-24 DIAGNOSIS — Z79.4 CONTROLLED TYPE 2 DIABETES MELLITUS WITHOUT COMPLICATION, WITH LONG-TERM CURRENT USE OF INSULIN (HCC): ICD-10-CM

## 2022-01-24 DIAGNOSIS — E11.9 CONTROLLED TYPE 2 DIABETES MELLITUS WITHOUT COMPLICATION, WITH LONG-TERM CURRENT USE OF INSULIN (HCC): ICD-10-CM

## 2022-01-24 RX ORDER — INSULIN GLARGINE 100 [IU]/ML
40 INJECTION, SOLUTION SUBCUTANEOUS EVERY EVENING
Qty: 36 ML | Refills: 1 | Status: SHIPPED
Start: 2022-01-24 | End: 2022-02-11

## 2022-01-26 DIAGNOSIS — E78.5 DYSLIPIDEMIA: ICD-10-CM

## 2022-01-26 RX ORDER — ROSUVASTATIN CALCIUM 40 MG/1
TABLET, COATED ORAL
Qty: 90 TABLET | Refills: 2 | Status: SHIPPED | OUTPATIENT
Start: 2022-01-26 | End: 2022-10-21

## 2022-01-26 NOTE — TELEPHONE ENCOUNTER
Received request via: Pharmacy    Was the patient seen in the last year in this department? Yes    Does the patient have an active prescription (recently filled or refills available) for medication(s) requested? No     Requested Prescriptions     Pending Prescriptions Disp Refills   • rosuvastatin (CRESTOR) 40 MG tablet 90 Tablet 2     Sig: TAKE ONE TABLET BY MOUTH ONE TIME DAILY

## 2022-02-11 DIAGNOSIS — E11.9 CONTROLLED TYPE 2 DIABETES MELLITUS WITHOUT COMPLICATION, WITH LONG-TERM CURRENT USE OF INSULIN (HCC): ICD-10-CM

## 2022-02-11 DIAGNOSIS — Z79.4 CONTROLLED TYPE 2 DIABETES MELLITUS WITHOUT COMPLICATION, WITH LONG-TERM CURRENT USE OF INSULIN (HCC): ICD-10-CM

## 2022-02-11 RX ORDER — INSULIN DEGLUDEC 200 U/ML
40 INJECTION, SOLUTION SUBCUTANEOUS EVERY EVENING
Qty: 36 ML | Refills: 1 | Status: SHIPPED | OUTPATIENT
Start: 2022-02-11 | End: 2022-04-06 | Stop reason: SDUPTHER

## 2022-02-23 DIAGNOSIS — I10 ESSENTIAL HYPERTENSION: ICD-10-CM

## 2022-02-23 RX ORDER — LISINOPRIL 20 MG/1
TABLET ORAL
Qty: 90 TABLET | Refills: 0 | Status: SHIPPED | OUTPATIENT
Start: 2022-02-23 | End: 2022-04-06 | Stop reason: SDUPTHER

## 2022-03-03 RX ORDER — ESTRADIOL 1 MG/1
TABLET ORAL
Qty: 90 TABLET | Refills: 0 | Status: SHIPPED | OUTPATIENT
Start: 2022-03-03 | End: 2022-04-13 | Stop reason: SDUPTHER

## 2022-03-31 ENCOUNTER — HOSPITAL ENCOUNTER (OUTPATIENT)
Dept: LAB | Facility: MEDICAL CENTER | Age: 62
End: 2022-03-31
Attending: INTERNAL MEDICINE
Payer: COMMERCIAL

## 2022-03-31 DIAGNOSIS — Z79.4 CONTROLLED TYPE 2 DIABETES MELLITUS WITHOUT COMPLICATION, WITH LONG-TERM CURRENT USE OF INSULIN (HCC): ICD-10-CM

## 2022-03-31 DIAGNOSIS — E11.9 CONTROLLED TYPE 2 DIABETES MELLITUS WITHOUT COMPLICATION, WITH LONG-TERM CURRENT USE OF INSULIN (HCC): ICD-10-CM

## 2022-03-31 DIAGNOSIS — E78.5 DYSLIPIDEMIA: ICD-10-CM

## 2022-03-31 DIAGNOSIS — Z79.4 LONG-TERM INSULIN USE (HCC): ICD-10-CM

## 2022-03-31 LAB
ALBUMIN SERPL BCP-MCNC: 4.1 G/DL (ref 3.2–4.9)
ALBUMIN/GLOB SERPL: 1.5 G/DL
ALP SERPL-CCNC: 54 U/L (ref 30–99)
ALT SERPL-CCNC: 42 U/L (ref 2–50)
ANION GAP SERPL CALC-SCNC: 9 MMOL/L (ref 7–16)
AST SERPL-CCNC: 33 U/L (ref 12–45)
BILIRUB SERPL-MCNC: 0.4 MG/DL (ref 0.1–1.5)
BUN SERPL-MCNC: 14 MG/DL (ref 8–22)
CALCIUM SERPL-MCNC: 9.2 MG/DL (ref 8.4–10.2)
CHLORIDE SERPL-SCNC: 101 MMOL/L (ref 96–112)
CHOLEST SERPL-MCNC: 177 MG/DL (ref 100–199)
CO2 SERPL-SCNC: 25 MMOL/L (ref 20–33)
CREAT SERPL-MCNC: 0.51 MG/DL (ref 0.5–1.4)
CREAT UR-MCNC: 22.65 MG/DL
FASTING STATUS PATIENT QL REPORTED: NORMAL
GFR SERPLBLD CREATININE-BSD FMLA CKD-EPI: 106 ML/MIN/1.73 M 2
GLOBULIN SER CALC-MCNC: 2.8 G/DL (ref 1.9–3.5)
GLUCOSE SERPL-MCNC: 137 MG/DL (ref 65–99)
HDLC SERPL-MCNC: 39 MG/DL
LDLC SERPL CALC-MCNC: 78 MG/DL
MICROALBUMIN UR-MCNC: <1.2 MG/DL
MICROALBUMIN/CREAT UR: NORMAL MG/G (ref 0–30)
POTASSIUM SERPL-SCNC: 4.3 MMOL/L (ref 3.6–5.5)
PROT SERPL-MCNC: 6.9 G/DL (ref 6–8.2)
SODIUM SERPL-SCNC: 135 MMOL/L (ref 135–145)
T4 FREE SERPL-MCNC: 1.09 NG/DL (ref 0.93–1.7)
TRIGL SERPL-MCNC: 300 MG/DL (ref 0–149)
TSH SERPL DL<=0.005 MIU/L-ACNC: 1.03 UIU/ML (ref 0.38–5.33)

## 2022-03-31 PROCEDURE — 84439 ASSAY OF FREE THYROXINE: CPT

## 2022-03-31 PROCEDURE — 80053 COMPREHEN METABOLIC PANEL: CPT

## 2022-03-31 PROCEDURE — 82043 UR ALBUMIN QUANTITATIVE: CPT

## 2022-03-31 PROCEDURE — 80061 LIPID PANEL: CPT

## 2022-03-31 PROCEDURE — 82570 ASSAY OF URINE CREATININE: CPT

## 2022-03-31 PROCEDURE — 36415 COLL VENOUS BLD VENIPUNCTURE: CPT

## 2022-03-31 PROCEDURE — 84443 ASSAY THYROID STIM HORMONE: CPT

## 2022-04-06 ENCOUNTER — OFFICE VISIT (OUTPATIENT)
Dept: ENDOCRINOLOGY | Facility: MEDICAL CENTER | Age: 62
End: 2022-04-06
Attending: INTERNAL MEDICINE
Payer: COMMERCIAL

## 2022-04-06 VITALS
WEIGHT: 176.9 LBS | DIASTOLIC BLOOD PRESSURE: 80 MMHG | HEIGHT: 59 IN | OXYGEN SATURATION: 97 % | SYSTOLIC BLOOD PRESSURE: 130 MMHG | HEART RATE: 89 BPM | BODY MASS INDEX: 35.66 KG/M2

## 2022-04-06 DIAGNOSIS — E11.9 CONTROLLED TYPE 2 DIABETES MELLITUS WITHOUT COMPLICATION, WITH LONG-TERM CURRENT USE OF INSULIN (HCC): ICD-10-CM

## 2022-04-06 DIAGNOSIS — Z79.4 CONTROLLED TYPE 2 DIABETES MELLITUS WITHOUT COMPLICATION, WITH LONG-TERM CURRENT USE OF INSULIN (HCC): ICD-10-CM

## 2022-04-06 DIAGNOSIS — E13.9 DIABETES 1.5, MANAGED AS TYPE 2 (HCC): ICD-10-CM

## 2022-04-06 DIAGNOSIS — I10 ESSENTIAL HYPERTENSION: ICD-10-CM

## 2022-04-06 DIAGNOSIS — Z79.4 LONG-TERM INSULIN USE (HCC): ICD-10-CM

## 2022-04-06 DIAGNOSIS — E11.65 TYPE 2 DIABETES MELLITUS WITH HYPERGLYCEMIA, WITH LONG-TERM CURRENT USE OF INSULIN (HCC): ICD-10-CM

## 2022-04-06 DIAGNOSIS — E78.5 DYSLIPIDEMIA: ICD-10-CM

## 2022-04-06 DIAGNOSIS — Z79.4 TYPE 2 DIABETES MELLITUS WITH HYPERGLYCEMIA, WITH LONG-TERM CURRENT USE OF INSULIN (HCC): ICD-10-CM

## 2022-04-06 LAB
HBA1C MFR BLD: 8.3 % (ref 0–5.6)
INT CON NEG: NEGATIVE
INT CON POS: POSITIVE

## 2022-04-06 PROCEDURE — 99212 OFFICE O/P EST SF 10 MIN: CPT | Performed by: INTERNAL MEDICINE

## 2022-04-06 PROCEDURE — 99214 OFFICE O/P EST MOD 30 MIN: CPT | Performed by: INTERNAL MEDICINE

## 2022-04-06 PROCEDURE — 83036 HEMOGLOBIN GLYCOSYLATED A1C: CPT | Performed by: INTERNAL MEDICINE

## 2022-04-06 RX ORDER — LISINOPRIL 20 MG/1
20 TABLET ORAL DAILY
Qty: 90 TABLET | Refills: 2 | Status: SHIPPED | OUTPATIENT
Start: 2022-04-06 | End: 2023-01-30 | Stop reason: SDUPTHER

## 2022-04-06 RX ORDER — INSULIN DEGLUDEC 200 U/ML
40 INJECTION, SOLUTION SUBCUTANEOUS EVERY EVENING
Qty: 30 ML | Refills: 6 | Status: SHIPPED | OUTPATIENT
Start: 2022-04-06 | End: 2022-07-05

## 2022-04-06 ASSESSMENT — PATIENT HEALTH QUESTIONNAIRE - PHQ9: CLINICAL INTERPRETATION OF PHQ2 SCORE: 0

## 2022-04-06 NOTE — PROGRESS NOTES
CHIEF COMPLAINT: Patient is here for follow up of Type 2 Diabetes Mellitus    HPI:     Rosario Blevins is a 61 y.o. female with Type 2 Diabetes Mellitus here for follow up.    Labs from 4/6/2022 show a1c is 8.0%  Labs from October 6, 2021 show A1c was 6.8%  Labs from March 24, 2021 show A1c was 7%  Labs from 5/28/2020 show A1c was 7.2%      She has a history of intolerance of Metformin because of diarrhea and she had recurrent yeast infections with an SGLT2 inhibitor specifically Farxiga        She is taking Tresiba 30u daily, and Ozempic 1.0 mg once a week    Lantus had to be replaced with Tresiba due to insurance coverage issues  She claims that when she took 40u of Tresiba she felt tired and so she lowered her dose  She admits to eating a lot of carbs recently  She admits to noncompliance with SMBG  She reports fasting BG are > 160          She has no history of CAD  She has hyperlipidemia and is on generic Crestor 40mg daily  She denies symptoms of statin related myopathy  Her last LDL cholesterol was 78 with triglycerides of 300 on March 31, 2022      She has essential hypertension is taking lisinopril  She is tolerating her medication fairly well  She does not have diabetic nephropathy   UACR was < 30 on 3/31/2022      She has no diabetic retinopathy   Eye exam was on 4/2021 by Dr. Taylor        BG Diary:  Patient did not bring her BG meter despite repeated requests to do so     Weight has been stable    Diabetes Complications   Retinopathy: No known retinopathy.  Last eye exam: 4/2021 with Dr. Taylor  Neuropathy: Denies paresthesias or numbness in hands or feet. Denies any foot wounds.  Exercise: Minimal.  Diet: Fair.  Patient's medications, allergies, and social histories were reviewed and updated as appropriate.    ROS:     CONS:     No fever, no chills   EYES:     No diplopia, no blurry vision   CV:           No chest pain, no palpitations   PULM:     No SOB, no cough, no hemoptysis.   GI:             "No nausea, no vomiting, no diarrhea, no constipation   ENDO:     No polyuria, no polydipsia, no heat intolerance, no cold intolerance       Past Medical History:  Problem List:  2021: Acute pansinusitis  2021: Achilles tendon pain  2020: Long-term insulin use ([facility])  2019: Left leg pain  2019: Influenza vaccination given  2018-10: Lipoma of back  2018-10: History of shingles  2018: Obesity (BMI 30-39.9)  2018: GERD (gastroesophageal reflux disease)  2013: Controlled type 2 diabetes mellitus without complication,   with long-term current use of insulin ([facility])  2013: HTN (hypertension)  2013: Dyslipidemia      Past Surgical History:  Past Surgical History:   Procedure Laterality Date   • CATARACT EXTRACTION WITH IOL Bilateral    • HYSTERECTOMY, TOTAL ABDOMINAL     • US-NEEDLE CORE BX-BREAST PANEL          Allergies:  Milk products food allergy and Codeine     Social History:  Social History     Tobacco Use   • Smoking status: Former Smoker     Types: Cigarettes     Quit date: 1991     Years since quittin.1   • Smokeless tobacco: Never Used   Vaping Use   • Vaping Use: Never used   Substance Use Topics   • Alcohol use: No   • Drug use: No        Family History:   family history includes Cancer in her mother; Heart Attack (age of onset: 38) in her maternal uncle; Heart Attack (age of onset: 40) in her maternal uncle; Heart Attack (age of onset: 50) in her maternal grandmother; No Known Problems in her father; Other in her brother and sister; Other (age of onset: 47) in her mother.      PHYSICAL EXAM:   OBJECTIVE:  Vital signs: /80 (BP Location: Left arm, Patient Position: Sitting, BP Cuff Size: Adult)   Pulse 89   Ht 1.499 m (4' 11\")   Wt 80.2 kg (176 lb 14.4 oz)   SpO2 97%   BMI 35.73 kg/m²   GENERAL: Well-developed, well-nourished in no apparent distress.   EYE:  No ocular asymmetry, PERRLA  HENT: Pink, moist mucous membranes.    NECK: No thyromegaly. "   CARDIOVASCULAR:  No murmurs  LUNGS: Clear breath sounds  ABDOMEN: Soft, nontender   EXTREMITIES: No clubbing, cyanosis, or edema.   NEUROLOGICAL: No gross focal motor abnormalities   LYMPH: No cervical adenopathy palpated.   SKIN: No rashes, she has a palpable soft, movable, lipoma on her upper back near the midline    Labs:  Lab Results   Component Value Date/Time    HBA1C 8.3 (A) 04/06/2022 07:55 AM        Lab Results   Component Value Date/Time    WBC 6.6 05/12/2016 06:49 AM    RBC 5.17 05/12/2016 06:49 AM    HEMOGLOBIN 14.8 05/12/2016 06:49 AM    MCV 87.2 05/12/2016 06:49 AM    MCH 28.6 05/12/2016 06:49 AM    MCHC 32.8 (L) 05/12/2016 06:49 AM    RDW 41.7 05/12/2016 06:49 AM    MPV 11.0 05/12/2016 06:49 AM       Lab Results   Component Value Date/Time    SODIUM 135 03/31/2022 08:01 AM    POTASSIUM 4.3 03/31/2022 08:01 AM    CHLORIDE 101 03/31/2022 08:01 AM    CO2 25 03/31/2022 08:01 AM    ANION 9.0 03/31/2022 08:01 AM    GLUCOSE 137 (H) 03/31/2022 08:01 AM    BUN 14 03/31/2022 08:01 AM    CREATININE 0.51 03/31/2022 08:01 AM    CALCIUM 9.2 03/31/2022 08:01 AM    ASTSGOT 33 03/31/2022 08:01 AM    ALTSGPT 42 03/31/2022 08:01 AM    TBILIRUBIN 0.4 03/31/2022 08:01 AM    ALBUMIN 4.1 03/31/2022 08:01 AM    TOTPROTEIN 6.9 03/31/2022 08:01 AM    GLOBULIN 2.8 03/31/2022 08:01 AM    AGRATIO 1.5 03/31/2022 08:01 AM       Lab Results   Component Value Date/Time    CHOLSTRLTOT 218 (H) 05/28/2020 1001    TRIGLYCERIDE 276 (H) 05/28/2020 1001    HDL 42 05/28/2020 1001     (H) 05/28/2020 1001       Lab Results   Component Value Date/Time    MALBCRT see below 03/31/2022 08:01 AM    MICROALBUR <1.2 03/31/2022 08:01 AM        Lab Results   Component Value Date/Time    TSHULTRASEN 1.580 05/12/2016 0649     No results found for: FREEDIR  No results found for: FREET3  No results found for: THYSTIMIG        ASSESSMENT/PLAN:     1. Type 2 diabetes mellitus with hyperglycemia, with long-term current use of insulin  (HCC)  Uncontrolled due to hyperglycemia  a1c is 8.0%  Increase Tresiba to 34 then to 38 then 40 until FBG is at goal  Explained to patient that uncontrolled sugars actually causes fatigue and her fatigue is not from her insulin  Ozempic 1mg weekly  I want her to watch her carb intake and exercise regularly  She is up to date with her labs  Follow up in 3 mos with repeat Edy A1c in the office    2. Dyslipidemia  Unstable  LDL-C is at goal  Triglycerides are high due to uncontrolled diabetes  - I expect them to improve with better BG control   Continue Crestor   We should try to repeat her fasting lipids in 3 to 6 months    3. Essential hypertension  Controlled  Repeat urine albumin next year    4. Long-term insulin use (HCC)  Patient is on long-term basal insulin therapy for type 2 diabetes      Return in about 3 months (around 7/6/2022).      Thank you kindly for allowing me to participate in the diabetes care plan for this patient.    Eris Almonte MD, FACE, [facility]  06/24/20    CC:   Yaya Frank P.A.-C.

## 2022-04-11 DIAGNOSIS — K21.9 GASTROESOPHAGEAL REFLUX DISEASE WITHOUT ESOPHAGITIS: ICD-10-CM

## 2022-04-11 RX ORDER — OMEPRAZOLE 40 MG/1
CAPSULE, DELAYED RELEASE ORAL
Qty: 90 CAPSULE | Refills: 0 | Status: SHIPPED | OUTPATIENT
Start: 2022-04-11 | End: 2022-04-13 | Stop reason: SDUPTHER

## 2022-04-13 ENCOUNTER — OFFICE VISIT (OUTPATIENT)
Dept: MEDICAL GROUP | Facility: MEDICAL CENTER | Age: 62
End: 2022-04-13
Payer: COMMERCIAL

## 2022-04-13 VITALS
RESPIRATION RATE: 16 BRPM | SYSTOLIC BLOOD PRESSURE: 124 MMHG | TEMPERATURE: 97.5 F | HEART RATE: 96 BPM | DIASTOLIC BLOOD PRESSURE: 78 MMHG | OXYGEN SATURATION: 99 % | BODY MASS INDEX: 35.32 KG/M2 | WEIGHT: 179.9 LBS | HEIGHT: 60 IN

## 2022-04-13 DIAGNOSIS — K21.9 GASTROESOPHAGEAL REFLUX DISEASE WITHOUT ESOPHAGITIS: ICD-10-CM

## 2022-04-13 DIAGNOSIS — Z79.890 HORMONE REPLACEMENT THERAPY (HRT): ICD-10-CM

## 2022-04-13 DIAGNOSIS — E66.9 OBESITY (BMI 30-39.9): ICD-10-CM

## 2022-04-13 DIAGNOSIS — M79.605 LEFT LEG PAIN: ICD-10-CM

## 2022-04-13 DIAGNOSIS — Z79.4 TYPE 2 DIABETES MELLITUS WITH HYPERGLYCEMIA, WITH LONG-TERM CURRENT USE OF INSULIN (HCC): ICD-10-CM

## 2022-04-13 DIAGNOSIS — E11.65 TYPE 2 DIABETES MELLITUS WITH HYPERGLYCEMIA, WITH LONG-TERM CURRENT USE OF INSULIN (HCC): ICD-10-CM

## 2022-04-13 DIAGNOSIS — M79.602 LEFT ARM PAIN: ICD-10-CM

## 2022-04-13 PROBLEM — J01.40 ACUTE PANSINUSITIS: Status: RESOLVED | Noted: 2021-07-30 | Resolved: 2022-04-13

## 2022-04-13 PROCEDURE — 99214 OFFICE O/P EST MOD 30 MIN: CPT | Performed by: PHYSICIAN ASSISTANT

## 2022-04-13 RX ORDER — OMEPRAZOLE 40 MG/1
40 CAPSULE, DELAYED RELEASE ORAL DAILY
Qty: 90 CAPSULE | Refills: 2 | Status: SHIPPED | OUTPATIENT
Start: 2022-04-13 | End: 2023-01-30 | Stop reason: SDUPTHER

## 2022-04-13 RX ORDER — IBUPROFEN 800 MG/1
800 TABLET ORAL EVERY 8 HOURS PRN
Qty: 60 TABLET | Refills: 1 | Status: SHIPPED | OUTPATIENT
Start: 2022-04-13 | End: 2023-04-19 | Stop reason: SDUPTHER

## 2022-04-13 RX ORDER — ESTRADIOL 1 MG/1
1 TABLET ORAL DAILY
Qty: 90 TABLET | Refills: 2 | Status: SHIPPED | OUTPATIENT
Start: 2022-04-13 | End: 2023-01-16

## 2022-04-13 NOTE — ASSESSMENT & PLAN NOTE
Chronic condition.  Has had pain now for approximately 6 years in the lateral thigh of the leg.  Symptoms are worse as the day progresses and she is at work.  Takes ibuprofen as needed.

## 2022-04-13 NOTE — ASSESSMENT & PLAN NOTE
This is a pleasant 61-year-old female recently was seen by Dr. Almonte.  Unfortunately her A1c was increased to 8.3.  Prior to that it was below 7.  She is currently on Ozempic 1 mg weekly.  Also on Tresiba which was increased to 40 units nightly.  She has gone up to 40 mg and is doing well.  Denies any fatigue issues.  Glucose levels are in the 140 region.  Improved from 180 or above.  She was sure to make an appointment with Dr. Almonte instead of one of the midlevel's that work in his office.  Her labs recently show an elevation in her triglycerides at 300.  Her previous levels were in normal range.

## 2022-04-13 NOTE — PROGRESS NOTES
Subjective:   Rosario Blevins is a 61 y.o. female here today for diabetes, left arm pain, left leg pain and general evaluation on health.    Type 2 diabetes mellitus with hyperglycemia, with long-term current use of insulin ([facility])  This is a pleasant 61-year-old female recently was seen by Dr. Almonte.  Unfortunately her A1c was increased to 8.3.  Prior to that it was below 7.  She is currently on Ozempic 1 mg weekly.  Also on Tresiba which was increased to 40 units nightly.  She has gone up to 40 mg and is doing well.  Denies any fatigue issues.  Glucose levels are in the 140 region.  Improved from 180 or above.  She was sure to make an appointment with Dr. Almonte instead of one of the midlevel's that work in his office.  Her labs recently show an elevation in her triglycerides at 300.  Her previous levels were in normal range.    Left arm pain  Complains of a chronic history of left arm pain.  Pain is in the forearm.  She states she had an injury to the wrist area in 2017.  Will have pain in the posterior aspect.  Pain is worse with movement.  Prevents her from being able to left upper arm above 90 degrees.  She denies any shoulder pain.  Is concerned that possibly a lipoma of the left scapula is causing the pain.    Left leg pain  Chronic condition.  Has had pain now for approximately 6 years in the lateral thigh of the leg.  Symptoms are worse as the day progresses and she is at work.  Takes ibuprofen as needed.       Current medicines (including changes today)  Current Outpatient Medications   Medication Sig Dispense Refill   • estradiol (ESTRACE) 1 MG Tab Take 1 Tablet by mouth every day. 90 Tablet 2   • ibuprofen (MOTRIN) 800 MG Tab Take 1 Tablet by mouth every 8 hours as needed. 60 Tablet 1   • omeprazole (PRILOSEC) 40 MG delayed-release capsule Take 1 Capsule by mouth every day. 90 Capsule 2   • lisinopril (PRINIVIL) 20 MG Tab Take 1 Tablet by mouth every day. 90 Tablet 2   • Insulin Degludec (TRESIBA  FLEXTOUCH) 200 UNIT/ML Solution Pen-injector Inject 40 Units under the skin every evening for 90 days. 30 mL 6   • rosuvastatin (CRESTOR) 40 MG tablet TAKE ONE TABLET BY MOUTH ONE TIME DAILY 90 Tablet 2   • Semaglutide, 1 MG/DOSE, (OZEMPIC, 1 MG/DOSE,) 4 MG/3ML Solution Pen-injector Inject 1 mg under the skin every 7 days. 9 mL 3   • amLODIPine (NORVASC) 5 MG Tab TAKE ONE TABLET BY MOUTH ONE TIME DAILY 90 Tablet 2   • Multiple Vitamins-Minerals (CENTRUM SILVER PO) Take  by mouth every day.       No current facility-administered medications for this visit.     She  has a past medical history of Diabetes, Hyperlipidemia, and Hypertension.    Social History and Family History were reviewed and updated.    ROS   No chest pain, no shortness of breath, no abdominal pain and all other systems were reviewed and are negative.       Objective:     /78   Pulse 96   Temp 36.4 °C (97.5 °F)   Resp 16   Ht 1.524 m (5')   Wt 81.6 kg (179 lb 14.3 oz)   SpO2 99%  Body mass index is 35.13 kg/m².   Physical Exam:  Constitutional: Alert, no distress.  Skin: Warm, dry, good turgor, no rashes in visible areas.  Eye: Equal, round and reactive, conjunctiva clear, lids normal.  ENMT: Lips without lesions, good dentition, oropharynx clear.  Neck: Trachea midline, no masses.   Lymph: No cervical or supraclavicular lymphadenopathy  Respiratory: Unlabored respiratory effort, lungs appear clear, no wheezes.  Cardiovascular: Regular rate and rhythm.  Psych: Alert and oriented x3, normal affect and mood.        Assessment and Plan:   The following treatment plan was discussed    1. Type 2 diabetes mellitus with hyperglycemia, with long-term current use of insulin (HCC)  Chronic condition.  A1c above 8.0 at 8.3 recently.  Considered uncontrolled.  Expect improvement though currently as she has initiated Tresiba at 40 units in the evenings.  Continue Ozempic as directed.  Follow-up with endocrinology in July.  Reviewed labs.    2.  Gastroesophageal reflux disease without esophagitis  Chronic condition.  Stable.  She needs a renewal of omeprazole.  Sent it to [facility].  - omeprazole (PRILOSEC) 40 MG delayed-release capsule; Take 1 Capsule by mouth every day.  Dispense: 90 Capsule; Refill: 2    3. Left leg pain  Chronic condition.  Stable.  Renewed ibuprofen.  We will continue to monitor.  - ibuprofen (MOTRIN) 800 MG Tab; Take 1 Tablet by mouth every 8 hours as needed.  Dispense: 60 Tablet; Refill: 1    4. Hormone replacement therapy (HRT)  Chronic use.  Renewed estradiol as directed.  - estradiol (ESTRACE) 1 MG Tab; Take 1 Tablet by mouth every day.  Dispense: 90 Tablet; Refill: 2    5. Left arm pain  New condition noted in chart but chronic.  Status post injury in 2017.  X-ray negative for acute injury but did show mild arthritis.  We will continue to monitor symptoms.  Deferred imaging.  Advise Rosario the wrist pain was not secondary to the lipoma of the her back.  It is likely secondary to the fall.      Followup: Return in about 6 months (around 10/13/2022), or if symptoms worsen or fail to improve.    Please note that this dictation was created using voice recognition software. I have made every reasonable attempt to correct obvious errors, but I expect that there are errors of grammar and possibly content that I did not discover before finalizing the note.

## 2022-04-13 NOTE — ASSESSMENT & PLAN NOTE
Complains of a chronic history of left arm pain.  Pain is in the forearm.  She states she had an injury to the wrist area in 2017.  Will have pain in the posterior aspect.  Pain is worse with movement.  Prevents her from being able to left upper arm above 90 degrees.  She denies any shoulder pain.  Is concerned that possibly a lipoma of the left scapula is causing the pain.

## 2022-06-29 ENCOUNTER — HOSPITAL ENCOUNTER (OUTPATIENT)
Dept: RADIOLOGY | Facility: MEDICAL CENTER | Age: 62
End: 2022-06-29
Attending: PHYSICIAN ASSISTANT
Payer: COMMERCIAL

## 2022-06-29 DIAGNOSIS — Z12.31 VISIT FOR SCREENING MAMMOGRAM: ICD-10-CM

## 2022-06-29 PROCEDURE — 77063 BREAST TOMOSYNTHESIS BI: CPT

## 2022-07-20 ENCOUNTER — OFFICE VISIT (OUTPATIENT)
Dept: ENDOCRINOLOGY | Facility: MEDICAL CENTER | Age: 62
End: 2022-07-20
Attending: INTERNAL MEDICINE
Payer: COMMERCIAL

## 2022-07-20 VITALS
HEART RATE: 96 BPM | DIASTOLIC BLOOD PRESSURE: 72 MMHG | BODY MASS INDEX: 38.83 KG/M2 | SYSTOLIC BLOOD PRESSURE: 120 MMHG | OXYGEN SATURATION: 94 % | HEIGHT: 57 IN | WEIGHT: 180 LBS

## 2022-07-20 DIAGNOSIS — I10 ESSENTIAL HYPERTENSION: ICD-10-CM

## 2022-07-20 DIAGNOSIS — Z79.4 LONG-TERM INSULIN USE (HCC): ICD-10-CM

## 2022-07-20 DIAGNOSIS — Z79.4 TYPE 2 DIABETES MELLITUS WITH HYPERGLYCEMIA, WITH LONG-TERM CURRENT USE OF INSULIN (HCC): ICD-10-CM

## 2022-07-20 DIAGNOSIS — E11.65 TYPE 2 DIABETES MELLITUS WITH HYPERGLYCEMIA, WITH LONG-TERM CURRENT USE OF INSULIN (HCC): ICD-10-CM

## 2022-07-20 DIAGNOSIS — E78.5 DYSLIPIDEMIA: ICD-10-CM

## 2022-07-20 LAB
HBA1C MFR BLD: 7.3 % (ref 0–5.6)
INT CON NEG: NEGATIVE
INT CON POS: POSITIVE

## 2022-07-20 PROCEDURE — 92250 FUNDUS PHOTOGRAPHY W/I&R: CPT | Performed by: INTERNAL MEDICINE

## 2022-07-20 PROCEDURE — 99214 OFFICE O/P EST MOD 30 MIN: CPT | Performed by: INTERNAL MEDICINE

## 2022-07-20 PROCEDURE — 99212 OFFICE O/P EST SF 10 MIN: CPT | Performed by: INTERNAL MEDICINE

## 2022-07-20 PROCEDURE — 83036 HEMOGLOBIN GLYCOSYLATED A1C: CPT | Performed by: INTERNAL MEDICINE

## 2022-07-20 NOTE — PROGRESS NOTES
CHIEF COMPLAINT: Patient is here for follow up of Type 2 Diabetes Mellitus    HPI:     Rosario Blevins is a 61 y.o. female with Type 2 Diabetes Mellitus here for follow up.      Labs from 7/20/2022 show A1c is 7.3%  Labs from 4/6/2022 show a1c was 8.3%  Labs from October 6, 2021 show A1c was 6.8%  Labs from March 24, 2021 show A1c was 7%  Labs from 5/28/2020 show A1c was 7.2%      She has a history of intolerance of Metformin because of diarrhea and she had recurrent yeast infections with an SGLT2 inhibitor specifically Farxiga        She is taking Tresiba 50u daily, and   Ozempic 1.0 mg once a week      She is very pleased that her A1c is better but wants to get it down lower  She denies SE with her meds  She still thinks Tresiba makes her sleep  We discussed going up on Ozempic           She has no history of CAD  She has hyperlipidemia and is on generic Crestor 40mg daily  She denies symptoms of statin related myopathy  Her last LDL cholesterol was 78 with triglycerides of 300 on March 31, 2022  We discussed getting repeat triglycerides in 3 to 4 months      She has essential hypertension is taking lisinopril  She is tolerating her medication fairly well  She does not have diabetic nephropathy   UACR was < 30 on 3/31/2022      She has no diabetic retinopathy   Eye exam was on 4/2021 by Dr. Taylor        BG Diary:  Patient did not bring her BG meter despite repeated requests to do so     Weight has been stable    Diabetes Complications   Retinopathy: No known retinopathy.  Last eye exam: 4/2021 with Dr. Taylor  Neuropathy: Denies paresthesias or numbness in hands or feet. Denies any foot wounds.  Exercise: Minimal.  Diet: Fair.  Patient's medications, allergies, and social histories were reviewed and updated as appropriate.    ROS:     CONS:     No fever, no chills   EYES:     No diplopia, no blurry vision   CV:           No chest pain, no palpitations   PULM:     No SOB, no cough, no hemoptysis.   GI:          "   No nausea, no vomiting, no diarrhea, no constipation   ENDO:     No polyuria, no polydipsia, no heat intolerance, no cold intolerance       Past Medical History:  Problem List:  2022: Left arm pain  2021: Acute pansinusitis  2021: Achilles tendon pain  2020: Long-term insulin use ([facility])  2019: Left leg pain  2019: Influenza vaccination given  2018-10: Lipoma of back  2018-10: History of shingles  2018: Obesity (BMI 30-39.9)  2018: GERD (gastroesophageal reflux disease)  2013: Type 2 diabetes mellitus with hyperglycemia, with long-term   current use of insulin ([facility])  2013: Essential hypertension  2013: Dyslipidemia      Past Surgical History:  Past Surgical History:   Procedure Laterality Date   • CATARACT EXTRACTION WITH IOL Bilateral    • HYSTERECTOMY, TOTAL ABDOMINAL     • US-NEEDLE CORE BX-BREAST PANEL          Allergies:  Milk products food allergy and Codeine     Social History:  Social History     Tobacco Use   • Smoking status: Former Smoker     Types: Cigarettes     Quit date: 1991     Years since quittin.4   • Smokeless tobacco: Never Used   Vaping Use   • Vaping Use: Never used   Substance Use Topics   • Alcohol use: No   • Drug use: No        Family History:   family history includes Cancer in her mother; Heart Attack (age of onset: 38) in her maternal uncle; Heart Attack (age of onset: 40) in her maternal uncle; Heart Attack (age of onset: 50) in her maternal grandmother; No Known Problems in her father; Other in her brother and sister; Other (age of onset: 47) in her mother.      PHYSICAL EXAM:   OBJECTIVE:  Vital signs: /72 (BP Location: Left arm, Patient Position: Sitting, BP Cuff Size: Adult)   Pulse 96   Ht 1.448 m (4' 9\")   Wt 81.6 kg (180 lb)   SpO2 94%   BMI 38.95 kg/m²   GENERAL: Well-developed, well-nourished in no apparent distress.   EYE:  No ocular asymmetry, PERRLA  HENT: Pink, moist mucous membranes.    NECK: No thyromegaly. "   CARDIOVASCULAR:  No murmurs  LUNGS: Clear breath sounds  ABDOMEN: Soft, nontender   EXTREMITIES: No clubbing, cyanosis, or edema.   NEUROLOGICAL: No gross focal motor abnormalities   LYMPH: No cervical adenopathy palpated.   SKIN: No rashes, she has a palpable soft, movable, lipoma on her upper back near the midline    Labs:  Lab Results   Component Value Date/Time    HBA1C 8.3 (A) 04/06/2022 07:55 AM        Lab Results   Component Value Date/Time    WBC 6.6 05/12/2016 06:49 AM    RBC 5.17 05/12/2016 06:49 AM    HEMOGLOBIN 14.8 05/12/2016 06:49 AM    MCV 87.2 05/12/2016 06:49 AM    MCH 28.6 05/12/2016 06:49 AM    MCHC 32.8 (L) 05/12/2016 06:49 AM    RDW 41.7 05/12/2016 06:49 AM    MPV 11.0 05/12/2016 06:49 AM       Lab Results   Component Value Date/Time    SODIUM 135 03/31/2022 08:01 AM    POTASSIUM 4.3 03/31/2022 08:01 AM    CHLORIDE 101 03/31/2022 08:01 AM    CO2 25 03/31/2022 08:01 AM    ANION 9.0 03/31/2022 08:01 AM    GLUCOSE 137 (H) 03/31/2022 08:01 AM    BUN 14 03/31/2022 08:01 AM    CREATININE 0.51 03/31/2022 08:01 AM    CALCIUM 9.2 03/31/2022 08:01 AM    ASTSGOT 33 03/31/2022 08:01 AM    ALTSGPT 42 03/31/2022 08:01 AM    TBILIRUBIN 0.4 03/31/2022 08:01 AM    ALBUMIN 4.1 03/31/2022 08:01 AM    TOTPROTEIN 6.9 03/31/2022 08:01 AM    GLOBULIN 2.8 03/31/2022 08:01 AM    AGRATIO 1.5 03/31/2022 08:01 AM       Lab Results   Component Value Date/Time    CHOLSTRLTOT 218 (H) 05/28/2020 1001    TRIGLYCERIDE 276 (H) 05/28/2020 1001    HDL 42 05/28/2020 1001     (H) 05/28/2020 1001       Lab Results   Component Value Date/Time    MALBCRT see below 03/31/2022 08:01 AM    MICROALBUR <1.2 03/31/2022 08:01 AM        Lab Results   Component Value Date/Time    TSHULTRASEN 1.580 05/12/2016 0649     No results found for: FREEDIR  No results found for: FREET3  No results found for: THYSTIMIG        ASSESSMENT/PLAN:     1. Type 2 diabetes mellitus with hyperglycemia, with long-term current use of insulin  (HCC)  Uncontrolled due to hyperglycemia  A1c is better at 7.3%  Continue Tresiba 50u daily  Increase  Ozempic 2mg weekly  We will get an eye exam in the office today  She is up to date with her labs  Follow-up with me in 4 months    2. Dyslipidemia  Stable   LDL-C is at goal  I am expecting that her triglycerides will be better with her next labs as long as she stays on track  Continue Crestor   Repeat fasting lipids in 4 months    3. Essential hypertension  Controlled  Repeat urine albumin next year    4. Long-term insulin use (HCC)  Patient is on long-term basal insulin therapy for type 2 diabetes      Return in about 4 months (around 11/20/2022).      Thank you kindly for allowing me to participate in the diabetes care plan for this patient.    Eris Almonte MD, FACE, [facility]  06/24/20    CC:   Yaya Frank P.A.-C.

## 2022-07-29 DIAGNOSIS — E11.9 CONTROLLED TYPE 2 DIABETES MELLITUS WITHOUT COMPLICATION, WITH LONG-TERM CURRENT USE OF INSULIN (HCC): ICD-10-CM

## 2022-07-29 DIAGNOSIS — Z79.4 CONTROLLED TYPE 2 DIABETES MELLITUS WITHOUT COMPLICATION, WITH LONG-TERM CURRENT USE OF INSULIN (HCC): ICD-10-CM

## 2022-07-31 RX ORDER — SEMAGLUTIDE 1.34 MG/ML
1 INJECTION, SOLUTION SUBCUTANEOUS
Qty: 9 ML | Refills: 3 | Status: SHIPPED
Start: 2022-07-31 | End: 2022-08-17

## 2022-08-01 LAB — RETINAL SCREEN: NEGATIVE

## 2022-08-17 ENCOUNTER — TELEPHONE (OUTPATIENT)
Dept: ENDOCRINOLOGY | Facility: MEDICAL CENTER | Age: 62
End: 2022-08-17
Payer: COMMERCIAL

## 2022-08-17 DIAGNOSIS — Z79.4 TYPE 2 DIABETES MELLITUS WITH HYPERGLYCEMIA, WITH LONG-TERM CURRENT USE OF INSULIN (HCC): ICD-10-CM

## 2022-08-17 DIAGNOSIS — E11.65 TYPE 2 DIABETES MELLITUS WITH HYPERGLYCEMIA, WITH LONG-TERM CURRENT USE OF INSULIN (HCC): ICD-10-CM

## 2022-08-17 RX ORDER — SEMAGLUTIDE 2.68 MG/ML
2 INJECTION, SOLUTION SUBCUTANEOUS
Qty: 3 ML | Refills: 5 | Status: SHIPPED | OUTPATIENT
Start: 2022-08-17 | End: 2022-11-15 | Stop reason: SDUPTHER

## 2022-08-17 NOTE — TELEPHONE ENCOUNTER
VOICEMAIL  1. Caller Name: Rosario Blevins                        Call Back Number: [phone] (home)      2. Message: Patient called and left a message and stated that she is suppose to be on 2mg of Ozempic but we did not sent it over to the pharmacy. Please sent in new RX to the pharmacy for patient.     Thank you.     3. Patient approves office to leave a detailed voicemail/MyChart message: yes

## 2022-10-13 ENCOUNTER — HOSPITAL ENCOUNTER (OUTPATIENT)
Dept: LAB | Facility: MEDICAL CENTER | Age: 62
End: 2022-10-13
Attending: INTERNAL MEDICINE
Payer: COMMERCIAL

## 2022-10-13 DIAGNOSIS — E78.5 DYSLIPIDEMIA: ICD-10-CM

## 2022-10-13 DIAGNOSIS — I10 ESSENTIAL HYPERTENSION: ICD-10-CM

## 2022-10-13 DIAGNOSIS — E11.65 TYPE 2 DIABETES MELLITUS WITH HYPERGLYCEMIA, WITH LONG-TERM CURRENT USE OF INSULIN (HCC): ICD-10-CM

## 2022-10-13 DIAGNOSIS — Z79.4 TYPE 2 DIABETES MELLITUS WITH HYPERGLYCEMIA, WITH LONG-TERM CURRENT USE OF INSULIN (HCC): ICD-10-CM

## 2022-10-13 LAB
ALBUMIN SERPL BCP-MCNC: 4.2 G/DL (ref 3.2–4.9)
ALBUMIN/GLOB SERPL: 1.4 G/DL
ALP SERPL-CCNC: 45 U/L (ref 30–99)
ALT SERPL-CCNC: 30 U/L (ref 2–50)
ANION GAP SERPL CALC-SCNC: 9 MMOL/L (ref 7–16)
AST SERPL-CCNC: 25 U/L (ref 12–45)
BILIRUB SERPL-MCNC: 0.5 MG/DL (ref 0.1–1.5)
BUN SERPL-MCNC: 17 MG/DL (ref 8–22)
CALCIUM SERPL-MCNC: 9.5 MG/DL (ref 8.5–10.5)
CHLORIDE SERPL-SCNC: 101 MMOL/L (ref 96–112)
CHOLEST SERPL-MCNC: 170 MG/DL (ref 100–199)
CO2 SERPL-SCNC: 27 MMOL/L (ref 20–33)
CREAT SERPL-MCNC: 0.45 MG/DL (ref 0.5–1.4)
FASTING STATUS PATIENT QL REPORTED: NORMAL
GFR SERPLBLD CREATININE-BSD FMLA CKD-EPI: 109 ML/MIN/1.73 M 2
GLOBULIN SER CALC-MCNC: 3 G/DL (ref 1.9–3.5)
GLUCOSE SERPL-MCNC: 104 MG/DL (ref 65–99)
HDLC SERPL-MCNC: 38 MG/DL
LDLC SERPL CALC-MCNC: 86 MG/DL
POTASSIUM SERPL-SCNC: 4.1 MMOL/L (ref 3.6–5.5)
PROT SERPL-MCNC: 7.2 G/DL (ref 6–8.2)
SODIUM SERPL-SCNC: 137 MMOL/L (ref 135–145)
TRIGL SERPL-MCNC: 231 MG/DL (ref 0–149)

## 2022-10-13 PROCEDURE — 36415 COLL VENOUS BLD VENIPUNCTURE: CPT

## 2022-10-13 PROCEDURE — 80061 LIPID PANEL: CPT

## 2022-10-13 PROCEDURE — 80053 COMPREHEN METABOLIC PANEL: CPT

## 2022-10-19 ENCOUNTER — OFFICE VISIT (OUTPATIENT)
Dept: MEDICAL GROUP | Facility: MEDICAL CENTER | Age: 62
End: 2022-10-19
Payer: COMMERCIAL

## 2022-10-19 VITALS
TEMPERATURE: 97.9 F | HEIGHT: 57 IN | DIASTOLIC BLOOD PRESSURE: 70 MMHG | OXYGEN SATURATION: 98 % | WEIGHT: 174.8 LBS | BODY MASS INDEX: 37.71 KG/M2 | SYSTOLIC BLOOD PRESSURE: 132 MMHG | HEART RATE: 87 BPM

## 2022-10-19 DIAGNOSIS — Z91.89 PNEUMOCOCCAL VACCINATION INDICATED: ICD-10-CM

## 2022-10-19 DIAGNOSIS — K21.9 GASTROESOPHAGEAL REFLUX DISEASE WITHOUT ESOPHAGITIS: ICD-10-CM

## 2022-10-19 DIAGNOSIS — R51.9 SINUS HEADACHE: ICD-10-CM

## 2022-10-19 DIAGNOSIS — Z79.4 TYPE 2 DIABETES MELLITUS WITH HYPERGLYCEMIA, WITH LONG-TERM CURRENT USE OF INSULIN (HCC): ICD-10-CM

## 2022-10-19 DIAGNOSIS — Z23 NEED FOR IMMUNIZATION AGAINST INFLUENZA: ICD-10-CM

## 2022-10-19 DIAGNOSIS — E11.65 TYPE 2 DIABETES MELLITUS WITH HYPERGLYCEMIA, WITH LONG-TERM CURRENT USE OF INSULIN (HCC): ICD-10-CM

## 2022-10-19 PROBLEM — M76.60 ACHILLES TENDON PAIN: Status: RESOLVED | Noted: 2021-03-31 | Resolved: 2022-10-19

## 2022-10-19 PROCEDURE — 99214 OFFICE O/P EST MOD 30 MIN: CPT | Mod: 25 | Performed by: PHYSICIAN ASSISTANT

## 2022-10-19 PROCEDURE — 90686 IIV4 VACC NO PRSV 0.5 ML IM: CPT | Performed by: PHYSICIAN ASSISTANT

## 2022-10-19 PROCEDURE — 90471 IMMUNIZATION ADMIN: CPT | Performed by: PHYSICIAN ASSISTANT

## 2022-10-19 RX ORDER — INSULIN DEGLUDEC 200 U/ML
50 INJECTION, SOLUTION SUBCUTANEOUS DAILY
COMMUNITY
Start: 2022-10-10 | End: 2022-11-15 | Stop reason: SDUPTHER

## 2022-10-19 NOTE — ASSESSMENT & PLAN NOTE
Complains of an intermittent sinus headache recently.  Has been taking over-the-counter Sudafed.  It has been improving.  Denies any worsening symptoms.

## 2022-10-19 NOTE — PROGRESS NOTES
Subjective:   Rosario Blevins is a 61 y.o. female here today for diabetes, GERD and vaccination status.    Type 2 diabetes mellitus with hyperglycemia, with long-term current use of insulin ([facility])  This is a pleasant 61-year-old female here today to follow-up on her health.  Recent A1c at 7.3.  Down one-point.  Needs an updated referral to endocrinology.  States that she may run out as Ozempic.  It is in short supply through "[employer].  Her Tresiba was also increased recently to 50 units daily.  She also had a retinal screen performed upstairs and it was normal.  She does follow with ophthalmology anyway.    GERD (gastroesophageal reflux disease)  Chronic condition.  Doing well.  Takes omeprazole daily.  If she does not take the medication she will have symptoms.    Sinus headache  Complains of an intermittent sinus headache recently.  Has been taking over-the-counter Sudafed.  It has been improving.  Denies any worsening symptoms.       Current medicines (including changes today)  Current Outpatient Medications   Medication Sig Dispense Refill    pneumococcal vaccine (PNEUMOVAX-23) 25 MCG/0.5ML Injection Inject 0.5 mL into the shoulder, thigh, or buttocks one time for 1 dose. 0.5 mL 0    TRESIBA FLEXTOUCH 200 UNIT/ML Solution Pen-injector Inject 50 Units as directed every day.      Semaglutide, 2 MG/DOSE, (OZEMPIC, 2 MG/DOSE,) 8 MG/3ML Solution Pen-injector Inject 2 mg under the skin every 7 days. 3 mL 5    amLODIPine (NORVASC) 5 MG Tab TAKE ONE TABLET BY MOUTH ONE TIME DAILY 90 Tablet 1    estradiol (ESTRACE) 1 MG Tab Take 1 Tablet by mouth every day. 90 Tablet 2    ibuprofen (MOTRIN) 800 MG Tab Take 1 Tablet by mouth every 8 hours as needed. 60 Tablet 1    omeprazole (PRILOSEC) 40 MG delayed-release capsule Take 1 Capsule by mouth every day. 90 Capsule 2    lisinopril (PRINIVIL) 20 MG Tab Take 1 Tablet by mouth every day. 90 Tablet 2    rosuvastatin (CRESTOR) 40 MG tablet TAKE ONE TABLET BY MOUTH ONE TIME DAILY  "90 Tablet 2    Multiple Vitamins-Minerals (CENTRUM SILVER PO) Take  by mouth every day.       No current facility-administered medications for this visit.     She  has a past medical history of Diabetes, Hyperlipidemia, and Hypertension.    Social History and Family History were reviewed and updated.    ROS   No chest pain, no shortness of breath, no abdominal pain and all other systems were reviewed and are negative.       Objective:     /70 (BP Location: Right arm, Patient Position: Sitting, BP Cuff Size: Adult)   Pulse 87   Temp 36.6 °C (97.9 °F) (Temporal)   Ht 1.448 m (4' 9\")   Wt 79.3 kg (174 lb 12.8 oz)   SpO2 98%  Body mass index is 37.83 kg/m².   Physical Exam:  Constitutional: Alert, no distress.  Skin: Warm, dry, good turgor, no rashes in visible areas.  Eye: Equal, round and reactive, conjunctiva clear, lids normal.  ENMT: Lips without lesions, good dentition, oropharynx clear.  Neck: Trachea midline, no masses.   Lymph: No cervical or supraclavicular lymphadenopathy  Respiratory: Unlabored respiratory effort, lungs appear clear.  Psych: Alert and oriented x3, normal affect and mood.        Assessment and Plan:   The following treatment plan was discussed    1. Type 2 diabetes mellitus with hyperglycemia, with long-term current use of insulin (HCC)  Chronic condition.  Stable.  Referral performed to endocrinology.  Continue medications as directed.  She is a candidate for the pneumonia vaccination, Pneumovax 23, given her chronic disease processes.  Ordered and sent to [facility].  Hopefully her medication will be available to her.  - Referral to Endocrinology  - pneumococcal vaccine (PNEUMOVAX-23) 25 MCG/0.5ML Injection; Inject 0.5 mL into the shoulder, thigh, or buttocks one time for 1 dose.  Dispense: 0.5 mL; Refill: 0    2. Gastroesophageal reflux disease without esophagitis  Chronic condition.  Stable.  Continue omeprazole as directed.    3. Sinus headache  Acute, new onset condition.  " Stable.  Continue over-the-counter medications as needed.    4. Need for immunization against influenza  Administer without complaints.  - INFLUENZA VACCINE QUAD INJ (PF)    5. Pneumococcal vaccination indicated  Sent a prescription over for the vaccine to her pharmacy.  - pneumococcal vaccine (PNEUMOVAX-23) 25 MCG/0.5ML Injection; Inject 0.5 mL into the shoulder, thigh, or buttocks one time for 1 dose.  Dispense: 0.5 mL; Refill: 0         Followup: Return in about 6 months (around 4/19/2023), or if symptoms worsen or fail to improve.    Please note that this dictation was created using voice recognition software. I have made every reasonable attempt to correct obvious errors, but I expect that there are errors of grammar and possibly content that I did not discover before finalizing the note.

## 2022-10-19 NOTE — ASSESSMENT & PLAN NOTE
This is a pleasant 61-year-old female here today to follow-up on her health.  Recent A1c at 7.3.  Down one-point.  Needs an updated referral to endocrinology.  States that she may run out as Ozempic.  It is in short supply through [employer].  Her Tresiba was also increased recently to 50 units daily.  She also had a retinal screen performed upstairs and it was normal.  She does follow with ophthalmology anyway.

## 2022-10-19 NOTE — ASSESSMENT & PLAN NOTE
Chronic condition.  Doing well.  Takes omeprazole daily.  If she does not take the medication she will have symptoms.

## 2022-11-02 ENCOUNTER — OFFICE VISIT (OUTPATIENT)
Dept: ENDOCRINOLOGY | Facility: MEDICAL CENTER | Age: 62
End: 2022-11-02
Attending: INTERNAL MEDICINE
Payer: COMMERCIAL

## 2022-11-02 VITALS
OXYGEN SATURATION: 96 % | SYSTOLIC BLOOD PRESSURE: 120 MMHG | WEIGHT: 174 LBS | HEART RATE: 76 BPM | BODY MASS INDEX: 37.54 KG/M2 | DIASTOLIC BLOOD PRESSURE: 82 MMHG | HEIGHT: 57 IN

## 2022-11-02 DIAGNOSIS — E11.9 CONTROLLED TYPE 2 DIABETES MELLITUS WITHOUT COMPLICATION, WITH LONG-TERM CURRENT USE OF INSULIN (HCC): ICD-10-CM

## 2022-11-02 DIAGNOSIS — Z79.4 LONG-TERM INSULIN USE (HCC): ICD-10-CM

## 2022-11-02 DIAGNOSIS — I10 ESSENTIAL HYPERTENSION: ICD-10-CM

## 2022-11-02 DIAGNOSIS — E78.5 DYSLIPIDEMIA: ICD-10-CM

## 2022-11-02 DIAGNOSIS — Z79.4 CONTROLLED TYPE 2 DIABETES MELLITUS WITHOUT COMPLICATION, WITH LONG-TERM CURRENT USE OF INSULIN (HCC): ICD-10-CM

## 2022-11-02 DIAGNOSIS — E55.9 VITAMIN D DEFICIENCY: ICD-10-CM

## 2022-11-02 LAB
HBA1C MFR BLD: 6.4 % (ref 0–5.6)
INT CON NEG: NEGATIVE
INT CON POS: POSITIVE

## 2022-11-02 PROCEDURE — 83036 HEMOGLOBIN GLYCOSYLATED A1C: CPT | Performed by: INTERNAL MEDICINE

## 2022-11-02 PROCEDURE — 99212 OFFICE O/P EST SF 10 MIN: CPT | Performed by: INTERNAL MEDICINE

## 2022-11-02 PROCEDURE — 99214 OFFICE O/P EST MOD 30 MIN: CPT | Performed by: INTERNAL MEDICINE

## 2022-11-02 NOTE — PROGRESS NOTES
CHIEF COMPLAINT: Patient is here for follow up of Type 2 Diabetes Mellitus    HPI:     Rosario Blevins is a 61 y.o. female with Type 2 Diabetes Mellitus here for follow up.    Labs from 11/2/2022 show a1c is 6.4%  Labs from 7/20/2022 show A1c was 7.3%  Labs from 4/6/2022 show a1c was 8.3%  Labs from October 6, 2021 show A1c was 6.8%  Labs from March 24, 2021 show A1c was 7%  Labs from 5/28/2020 show A1c was 7.2%      She has a history of intolerance of Metformin because of diarrhea and she had recurrent yeast infections with an SGLT2 inhibitor specifically Farxiga        She is taking Tresiba 50u daily, and   Ozempic 2.0 mg once a week      She denies SE with her meds  She is out of Ozempic due to supply chain issues          She has no history of CAD  She has hyperlipidemia and is on generic Crestor 40mg daily  She denies symptoms of statin related myopathy  Her last LDL cholesterol was 86 on 10/13/2022        She has essential hypertension is taking lisinopril  She is tolerating her medication fairly well  She does not have diabetic nephropathy   UACR was < 30 on 3/31/2022      She has no diabetic retinopathy   Eye exam was on 8/2022        BG Diary:  Patient forgot her meter  Weight has been stable    Diabetes Complications   Retinopathy: No known retinopathy.  Last eye exam: 8/2022 with Dr. Taylor  Neuropathy: Denies paresthesias or numbness in hands or feet. Denies any foot wounds.  Exercise: Minimal.  Diet: Fair.  Patient's medications, allergies, and social histories were reviewed and updated as appropriate.    ROS:     CONS:     No fever, no chills   EYES:     No diplopia, no blurry vision   CV:           No chest pain, no palpitations   PULM:     No SOB, no cough, no hemoptysis.   GI:            No nausea, no vomiting, no diarrhea, no constipation   ENDO:     No polyuria, no polydipsia, no heat intolerance, no cold intolerance       Past Medical History:  Problem List:  2022-10: Sinus headache  2022-04:  "Left arm pain  2021: Acute pansinusitis  2021: Achilles tendon pain  2020: Long-term insulin use ([facility])  2019: Left leg pain  2019: Influenza vaccination given  2018-10: Lipoma of back  2018-10: History of shingles  2018: Obesity (BMI 30-39.9)  2018: GERD (gastroesophageal reflux disease)  2013: Type 2 diabetes mellitus with hyperglycemia, with long-term   current use of insulin ([facility])  2013: Essential hypertension  2013: Dyslipidemia    Past Surgical History:  Past Surgical History:   Procedure Laterality Date    CATARACT EXTRACTION WITH IOL Bilateral 2020    HYSTERECTOMY, TOTAL ABDOMINAL      US-NEEDLE CORE BX-BREAST PANEL          Allergies:  Milk products food allergy and Codeine     Social History:  Social History     Tobacco Use    Smoking status: Former     Types: Cigarettes     Quit date: 1991     Years since quittin.7    Smokeless tobacco: Never   Vaping Use    Vaping Use: Never used   Substance Use Topics    Alcohol use: No    Drug use: No        Family History:   family history includes Cancer in her mother; Heart Attack (age of onset: 38) in her maternal uncle; Heart Attack (age of onset: 40) in her maternal uncle; Heart Attack (age of onset: 50) in her maternal grandmother; No Known Problems in her father; Other in her brother and sister; Other (age of onset: 47) in her mother.      PHYSICAL EXAM:   OBJECTIVE:  Vital signs: /82   Pulse 76   Ht 1.448 m (4' 9\")   Wt 78.9 kg (174 lb)   SpO2 96%   BMI 37.65 kg/m²   GENERAL: Well-developed, well-nourished in no apparent distress.   EYE:  No ocular asymmetry, PERRLA  HENT: Pink, moist mucous membranes.    NECK: No thyromegaly.   CARDIOVASCULAR:  No murmurs  LUNGS: Clear breath sounds  ABDOMEN: Soft, nontender   EXTREMITIES: No clubbing, cyanosis, or edema.   NEUROLOGICAL: No gross focal motor abnormalities   LYMPH: No cervical adenopathy palpated.   SKIN: No rashes, she has a palpable soft, movable, lipoma on " her upper back near the midline    Labs:  Lab Results   Component Value Date/Time    HBA1C 6.4 (A) 11/02/2022 10:02 AM        Lab Results   Component Value Date/Time    WBC 6.6 05/12/2016 06:49 AM    RBC 5.17 05/12/2016 06:49 AM    HEMOGLOBIN 14.8 05/12/2016 06:49 AM    MCV 87.2 05/12/2016 06:49 AM    MCH 28.6 05/12/2016 06:49 AM    MCHC 32.8 (L) 05/12/2016 06:49 AM    RDW 41.7 05/12/2016 06:49 AM    MPV 11.0 05/12/2016 06:49 AM       Lab Results   Component Value Date/Time    SODIUM 137 10/13/2022 07:23 AM    POTASSIUM 4.1 10/13/2022 07:23 AM    CHLORIDE 101 10/13/2022 07:23 AM    CO2 27 10/13/2022 07:23 AM    ANION 9.0 10/13/2022 07:23 AM    GLUCOSE 104 (H) 10/13/2022 07:23 AM    BUN 17 10/13/2022 07:23 AM    CREATININE 0.45 (L) 10/13/2022 07:23 AM    CALCIUM 9.5 10/13/2022 07:23 AM    ASTSGOT 25 10/13/2022 07:23 AM    ALTSGPT 30 10/13/2022 07:23 AM    TBILIRUBIN 0.5 10/13/2022 07:23 AM    ALBUMIN 4.2 10/13/2022 07:23 AM    TOTPROTEIN 7.2 10/13/2022 07:23 AM    GLOBULIN 3.0 10/13/2022 07:23 AM    AGRATIO 1.4 10/13/2022 07:23 AM       Lab Results   Component Value Date/Time    CHOLSTRLTOT 218 (H) 05/28/2020 1001    TRIGLYCERIDE 276 (H) 05/28/2020 1001    HDL 42 05/28/2020 1001     (H) 05/28/2020 1001       Lab Results   Component Value Date/Time    MALBCRT see below 03/31/2022 08:01 AM    MICROALBUR <1.2 03/31/2022 08:01 AM        Lab Results   Component Value Date/Time    TSHULTRASEN 1.580 05/12/2016 0649     No results found for: FREEDIR  No results found for: FREET3  No results found for: THYSTIMIG        ASSESSMENT/PLAN:     1. Type 2 diabetes mellitus with hyperglycemia, with long-term current use of insulin (HCC)  Improved control  A1c is better at 6.4%  Continue Tresiba 50u daily  Continue Ozempic 2mg weekly  Follow up in 6 mos    2. Dyslipidemia  Stable   LDL-C is at goal  Continue Crestor   Repeat fasting lipids in 6 months    3. Essential hypertension  Controlled  Repeat urine albumin in 6  mos    4. Long-term insulin use (HCC)  Patient is on long-term basal insulin therapy for type 2 diabetes      Return in about 6 months (around 5/2/2023).      Thank you kindly for allowing me to participate in the diabetes care plan for this patient.    Eris Almonte MD, [facility], [facility]  06/24/20    CC:   Yaya Frank P.A.-C.

## 2022-11-15 DIAGNOSIS — Z79.4 TYPE 2 DIABETES MELLITUS WITH HYPERGLYCEMIA, WITH LONG-TERM CURRENT USE OF INSULIN (HCC): ICD-10-CM

## 2022-11-15 DIAGNOSIS — E11.65 TYPE 2 DIABETES MELLITUS WITH HYPERGLYCEMIA, WITH LONG-TERM CURRENT USE OF INSULIN (HCC): ICD-10-CM

## 2022-11-15 RX ORDER — SEMAGLUTIDE 2.68 MG/ML
2 INJECTION, SOLUTION SUBCUTANEOUS
Qty: 3 ML | Refills: 5 | Status: SHIPPED | OUTPATIENT
Start: 2022-11-15 | End: 2023-05-10 | Stop reason: SDUPTHER

## 2022-11-15 RX ORDER — INSULIN DEGLUDEC 200 U/ML
50 INJECTION, SOLUTION SUBCUTANEOUS DAILY
Qty: 27 ML | Refills: 3 | Status: SHIPPED | OUTPATIENT
Start: 2022-11-15 | End: 2023-05-10 | Stop reason: SDUPTHER

## 2022-11-18 DIAGNOSIS — I10 ESSENTIAL HYPERTENSION: ICD-10-CM

## 2022-11-18 RX ORDER — AMLODIPINE BESYLATE 5 MG/1
5 TABLET ORAL DAILY
Qty: 90 TABLET | Refills: 1 | Status: SHIPPED | OUTPATIENT
Start: 2022-11-18 | End: 2023-04-19 | Stop reason: SDUPTHER

## 2023-01-14 DIAGNOSIS — Z79.890 HORMONE REPLACEMENT THERAPY (HRT): ICD-10-CM

## 2023-01-16 RX ORDER — ESTRADIOL 1 MG/1
TABLET ORAL
Qty: 90 TABLET | Refills: 0 | Status: SHIPPED | OUTPATIENT
Start: 2023-01-16 | End: 2023-01-30 | Stop reason: SDUPTHER

## 2023-01-30 ENCOUNTER — OFFICE VISIT (OUTPATIENT)
Dept: MEDICAL GROUP | Facility: MEDICAL CENTER | Age: 63
End: 2023-01-30
Payer: COMMERCIAL

## 2023-01-30 VITALS
TEMPERATURE: 98.2 F | OXYGEN SATURATION: 98 % | DIASTOLIC BLOOD PRESSURE: 70 MMHG | HEIGHT: 57 IN | SYSTOLIC BLOOD PRESSURE: 132 MMHG | BODY MASS INDEX: 39.4 KG/M2 | HEART RATE: 89 BPM | WEIGHT: 182.6 LBS

## 2023-01-30 DIAGNOSIS — Z79.4 TYPE 2 DIABETES MELLITUS WITH HYPERGLYCEMIA, WITH LONG-TERM CURRENT USE OF INSULIN (HCC): ICD-10-CM

## 2023-01-30 DIAGNOSIS — I10 ESSENTIAL HYPERTENSION: ICD-10-CM

## 2023-01-30 DIAGNOSIS — K21.9 GASTROESOPHAGEAL REFLUX DISEASE WITHOUT ESOPHAGITIS: ICD-10-CM

## 2023-01-30 DIAGNOSIS — Z79.890 HORMONE REPLACEMENT THERAPY (HRT): ICD-10-CM

## 2023-01-30 DIAGNOSIS — E11.65 TYPE 2 DIABETES MELLITUS WITH HYPERGLYCEMIA, WITH LONG-TERM CURRENT USE OF INSULIN (HCC): ICD-10-CM

## 2023-01-30 DIAGNOSIS — J01.11 ACUTE RECURRENT FRONTAL SINUSITIS: ICD-10-CM

## 2023-01-30 PROCEDURE — 99214 OFFICE O/P EST MOD 30 MIN: CPT | Performed by: PHYSICIAN ASSISTANT

## 2023-01-30 RX ORDER — METHYLPREDNISOLONE 4 MG/1
TABLET ORAL
Qty: 21 TABLET | Refills: 0 | Status: SHIPPED | OUTPATIENT
Start: 2023-01-30 | End: 2023-03-24

## 2023-01-30 RX ORDER — LISINOPRIL 20 MG/1
20 TABLET ORAL DAILY
Qty: 90 TABLET | Refills: 2 | Status: SHIPPED | OUTPATIENT
Start: 2023-01-30 | End: 2023-12-07 | Stop reason: SDUPTHER

## 2023-01-30 RX ORDER — AMOXICILLIN 875 MG/1
875 TABLET, COATED ORAL 2 TIMES DAILY
Qty: 14 TABLET | Refills: 0 | Status: SHIPPED | OUTPATIENT
Start: 2023-01-30 | End: 2023-02-06

## 2023-01-30 RX ORDER — ESTRADIOL 1 MG/1
1 TABLET ORAL DAILY
Qty: 90 TABLET | Refills: 2 | Status: SHIPPED | OUTPATIENT
Start: 2023-01-30 | End: 2023-11-14 | Stop reason: SDUPTHER

## 2023-01-30 RX ORDER — OMEPRAZOLE 40 MG/1
40 CAPSULE, DELAYED RELEASE ORAL DAILY
Qty: 90 CAPSULE | Refills: 2 | Status: SHIPPED | OUTPATIENT
Start: 2023-01-30 | End: 2023-11-13 | Stop reason: SDUPTHER

## 2023-01-30 NOTE — ASSESSMENT & PLAN NOTE
Chronic condition.  Doing well with her medications.  Currently on 2 mg of Ozempic weekly.  Last A1c was at 6.4.  She has labs pending to be done and her appointment with endocrinology is in May.

## 2023-01-30 NOTE — ASSESSMENT & PLAN NOTE
This is a pleasant 62-year-old female complains of a 3-week history of sinus pain and pressure of her frontal sinuses.  Associated purulent discharge.  Has been taking over-the-counter cold medication.  Taking decongestions.  No improvement.  Denies any fevers.  She has had similar issues in the past.

## 2023-01-30 NOTE — PROGRESS NOTES
Subjective:   Rosario Blevins is a 62 y.o. female here today for acute sinusitis and diabetes.    Acute recurrent frontal sinusitis  This is a pleasant 62-year-old female complains of a 3-week history of sinus pain and pressure of her frontal sinuses.  Associated purulent discharge.  Has been taking over-the-counter cold medication.  Taking decongestions.  No improvement.  Denies any fevers.  She has had similar issues in the past.    Type 2 diabetes mellitus with hyperglycemia, with long-term current use of insulin ([facility])  Chronic condition.  Doing well with her medications.  Currently on 2 mg of Ozempic weekly.  Last A1c was at 6.4.  She has labs pending to be done and her appointment with endocrinology is in May.       Current medicines (including changes today)  Current Outpatient Medications   Medication Sig Dispense Refill    amoxicillin (AMOXIL) 875 MG tablet Take 1 Tablet by mouth 2 times a day for 7 days. 14 Tablet 0    methylPREDNISolone (MEDROL DOSEPAK) 4 MG Tablet Therapy Pack As directed on the packaging label. 21 Tablet 0    lisinopril (PRINIVIL) 20 MG Tab Take 1 Tablet by mouth every day. 90 Tablet 2    omeprazole (PRILOSEC) 40 MG delayed-release capsule Take 1 Capsule by mouth every day. 90 Capsule 2    estradiol (ESTRACE) 1 MG Tab Take 1 Tablet by mouth every day. 90 Tablet 2    amLODIPine (NORVASC) 5 MG Tab Take 1 Tablet by mouth every day. 90 Tablet 1    TRESIBA FLEXTOUCH 200 UNIT/ML Solution Pen-injector Inject 50 Units as directed every day. 27 mL 3    Semaglutide, 2 MG/DOSE, (OZEMPIC, 2 MG/DOSE,) 8 MG/3ML Solution Pen-injector Inject 2 mg under the skin every 7 days. 3 mL 5    rosuvastatin (CRESTOR) 40 MG tablet TAKE ONE TABLET BY MOUTH ONE TIME DAILY 90 Tablet 1    ibuprofen (MOTRIN) 800 MG Tab Take 1 Tablet by mouth every 8 hours as needed. 60 Tablet 1    Multiple Vitamins-Minerals (CENTRUM SILVER PO) Take  by mouth every day.       No current facility-administered medications for this  "visit.     She  has a past medical history of Diabetes, Hyperlipidemia, and Hypertension.    Social History and Family History were reviewed and updated.    ROS   No chest pain, no shortness of breath, no abdominal pain and all other systems were reviewed and are negative.       Objective:     /70 (BP Location: Right arm, Patient Position: Sitting, BP Cuff Size: Adult)   Pulse 89   Temp 36.8 °C (98.2 °F) (Temporal)   Ht 1.448 m (4' 9\")   Wt 82.8 kg (182 lb 9.6 oz)   SpO2 98%  Body mass index is 39.51 kg/m².   Physical Exam:  Constitutional: Alert, no distress.  Skin: Warm, dry, good turgor, no rashes in visible areas.  Eye: Equal, round and reactive, conjunctiva clear, lids normal.  ENMT: Lips without lesions, good dentition, oropharynx clear.  Neck: Trachea midline, no masses.   Lymph: No cervical or supraclavicular lymphadenopathy  Respiratory: Unlabored respiratory effort, lungs appear clear.  Psych: Alert and oriented x3, normal affect and mood.        Assessment and Plan:   The following treatment plan was discussed    1. Acute recurrent frontal sinusitis  Acute, new onset condition.  Symptoms do appear to be chronic in nature.  Continue over-the-counter medications.  Prescribed both Medrol Dosepak and amoxicillin take as directed.  Expect symptoms to gradually improve.  If not improved after 1 week she should contact me through MyChart to change in medication.  - amoxicillin (AMOXIL) 875 MG tablet; Take 1 Tablet by mouth 2 times a day for 7 days.  Dispense: 14 Tablet; Refill: 0  - methylPREDNISolone (MEDROL DOSEPAK) 4 MG Tablet Therapy Pack; As directed on the packaging label.  Dispense: 21 Tablet; Refill: 0    2. Type 2 diabetes mellitus with hyperglycemia, with long-term current use of insulin (HCC)  Condition.  Well-controlled.  Reviewed medications.  Continue medications as directed.  Follow with endocrinology.    3. Essential hypertension  Chronic condition.  Controlled.  Renew lisinopril 20 mg " daily.  - lisinopril (PRINIVIL) 20 MG Tab; Take 1 Tablet by mouth every day.  Dispense: 90 Tablet; Refill: 2    4. Gastroesophageal reflux disease without esophagitis  Chronic condition.  Controlled.  Renewed Ozempic as directed.  - omeprazole (PRILOSEC) 40 MG delayed-release capsule; Take 1 Capsule by mouth every day.  Dispense: 90 Capsule; Refill: 2    5. Hormone replacement therapy (HRT)  Chronic condition.  Stable.  Renewed estradiol as directed.  - estradiol (ESTRACE) 1 MG Tab; Take 1 Tablet by mouth every day.  Dispense: 90 Tablet; Refill: 2         Followup: Return if symptoms worsen or fail to improve.    Please note that this dictation was created using voice recognition software. I have made every reasonable attempt to correct obvious errors, but I expect that there are errors of grammar and possibly content that I did not discover before finalizing the note.

## 2023-03-19 DIAGNOSIS — E78.5 DYSLIPIDEMIA: ICD-10-CM

## 2023-03-20 RX ORDER — ROSUVASTATIN CALCIUM 40 MG/1
TABLET, COATED ORAL
Qty: 90 TABLET | Refills: 0 | Status: SHIPPED | OUTPATIENT
Start: 2023-03-20 | End: 2023-04-19 | Stop reason: SDUPTHER

## 2023-03-24 ENCOUNTER — OFFICE VISIT (OUTPATIENT)
Dept: MEDICAL GROUP | Facility: MEDICAL CENTER | Age: 63
End: 2023-03-24
Payer: COMMERCIAL

## 2023-03-24 VITALS
RESPIRATION RATE: 18 BRPM | WEIGHT: 176.37 LBS | HEIGHT: 57 IN | OXYGEN SATURATION: 96 % | DIASTOLIC BLOOD PRESSURE: 70 MMHG | BODY MASS INDEX: 38.05 KG/M2 | HEART RATE: 96 BPM | SYSTOLIC BLOOD PRESSURE: 120 MMHG | TEMPERATURE: 98.6 F

## 2023-03-24 DIAGNOSIS — I10 ESSENTIAL HYPERTENSION: ICD-10-CM

## 2023-03-24 DIAGNOSIS — J01.11 ACUTE RECURRENT FRONTAL SINUSITIS: ICD-10-CM

## 2023-03-24 DIAGNOSIS — Z79.4 TYPE 2 DIABETES MELLITUS WITH HYPERGLYCEMIA, WITH LONG-TERM CURRENT USE OF INSULIN (HCC): ICD-10-CM

## 2023-03-24 DIAGNOSIS — E11.65 TYPE 2 DIABETES MELLITUS WITH HYPERGLYCEMIA, WITH LONG-TERM CURRENT USE OF INSULIN (HCC): ICD-10-CM

## 2023-03-24 PROCEDURE — 99214 OFFICE O/P EST MOD 30 MIN: CPT | Performed by: FAMILY MEDICINE

## 2023-03-24 RX ORDER — AMOXICILLIN AND CLAVULANATE POTASSIUM 875; 125 MG/1; MG/1
1 TABLET, FILM COATED ORAL 2 TIMES DAILY
Qty: 14 TABLET | Refills: 0 | Status: SHIPPED | OUTPATIENT
Start: 2023-03-24 | End: 2023-03-31

## 2023-03-24 ASSESSMENT — ENCOUNTER SYMPTOMS
SINUS PAIN: 1
ABDOMINAL PAIN: 0
SHORTNESS OF BREATH: 0
COUGH: 0
WHEEZING: 0
BLOOD IN STOOL: 0
DIARRHEA: 0
CHILLS: 0
CONSTIPATION: 0
PALPITATIONS: 0
FEVER: 0

## 2023-03-24 ASSESSMENT — PATIENT HEALTH QUESTIONNAIRE - PHQ9: CLINICAL INTERPRETATION OF PHQ2 SCORE: 0

## 2023-03-24 NOTE — LETTER
March 24, 2023       Patient: Rosario Blevins   YOB: 1960   Date of Visit: 3/24/2023         To Whom It May Concern:    Rosario Blevins was seen by me on 3/24/2023.  In my medical opinion, I recommend that Rosario Blevins remain out of work on 3/24/2023 and 3/25/2023 and valeria return to work on 3/26/2023 if she is feeling better.    If you have any questions or concerns, please don't hesitate to call [phone]          Sincerely,          Eamon Teresa M.D.  Electronically Signed

## 2023-03-24 NOTE — PROGRESS NOTES
FAMILY MEDICINE VISIT                                                               Chief complaint::Diagnoses of Acute recurrent frontal sinusitis and Type 2 diabetes mellitus with hyperglycemia, with long-term current use of insulin ([facility]) were pertinent to this visit.    History of present illness: Rosario Blevins is a 62 y.o. female who presented for sinus pressure, pain.      She has been experiencing frontal sinus pressure, pain, nasal congestion symptoms since last week.  She has not been feeling well also.  She denies any fever.  Reports that feels hot and cold.  Has history of diabetes, currently on Ozempic, reports that does not eat much as she is on Ozempic.  She was seen by PCP in January and was prescribed antibiotic and steroids for sinus infection.  She reports that she bought at the allergy medication Claritin.  She has Flonase medication at home.      Review of systems:     Review of Systems   Constitutional:  Positive for malaise/fatigue. Negative for chills and fever.   HENT:  Positive for congestion and sinus pain. Negative for ear discharge and ear pain.    Respiratory:  Negative for cough, shortness of breath and wheezing.    Cardiovascular:  Negative for chest pain, palpitations and leg swelling.   Gastrointestinal:  Negative for abdominal pain, blood in stool, constipation and diarrhea.      Medications and Allergies:     Current Outpatient Medications   Medication Sig Dispense Refill    amoxicillin-clavulanate (AUGMENTIN) 875-125 MG Tab Take 1 Tablet by mouth 2 times a day for 7 days. 14 Tablet 0    rosuvastatin (CRESTOR) 40 MG tablet TAKE ONE TABLET BY MOUTH ONE TIME DAILY 90 Tablet 0    lisinopril (PRINIVIL) 20 MG Tab Take 1 Tablet by mouth every day. 90 Tablet 2    omeprazole (PRILOSEC) 40 MG delayed-release capsule Take 1 Capsule by mouth every day. 90 Capsule 2    estradiol (ESTRACE) 1 MG Tab Take 1 Tablet by mouth every day. 90 Tablet 2    amLODIPine (NORVASC) 5 MG Tab Take 1 Tablet  "by mouth every day. 90 Tablet 1    TRESIBA FLEXTOUCH 200 UNIT/ML Solution Pen-injector Inject 50 Units as directed every day. 27 mL 3    Semaglutide, 2 MG/DOSE, (OZEMPIC, 2 MG/DOSE,) 8 MG/3ML Solution Pen-injector Inject 2 mg under the skin every 7 days. 3 mL 5    ibuprofen (MOTRIN) 800 MG Tab Take 1 Tablet by mouth every 8 hours as needed. 60 Tablet 1    Multiple Vitamins-Minerals (CENTRUM SILVER PO) Take  by mouth every day.       No current facility-administered medications for this visit.          Vitals:    /70   Pulse 96   Temp 37 °C (98.6 °F)   Resp 18   Ht 1.448 m (4' 9\")   Wt 80 kg (176 lb 5.9 oz)   SpO2 96%  Body mass index is 38.17 kg/m².    Physical Exam:     Physical Exam  Constitutional:       Appearance: Normal appearance.   HENT:      Head: Normocephalic and atraumatic.      Right Ear: Tympanic membrane, ear canal and external ear normal.      Left Ear: Tympanic membrane, ear canal and external ear normal.      Nose: Congestion present.      Right Sinus: Frontal sinus tenderness present.      Left Sinus: Frontal sinus tenderness present.      Mouth/Throat:      Pharynx: No oropharyngeal exudate or posterior oropharyngeal erythema.   Eyes:      Conjunctiva/sclera: Conjunctivae normal.   Cardiovascular:      Rate and Rhythm: Normal rate and regular rhythm.      Heart sounds: Normal heart sounds. No murmur heard.    No friction rub. No gallop.   Pulmonary:      Effort: Pulmonary effort is normal. No respiratory distress.      Breath sounds: Normal breath sounds. No wheezing or rales.   Musculoskeletal:         General: No deformity.      Cervical back: Neck supple.   Neurological:      Mental Status: She is alert.      Gait: Gait is intact.   Psychiatric:         Mood and Affect: Mood and affect normal.         Behavior: Behavior normal.          Assessment/Plan:         1. Acute recurrent frontal sinusitis  New problem, unstable, start Augmentin 1 tablet 2 times daily for 7 days.  " Recommended to use [place] pot and use Flonase nasal spray.  We are going to hold onto giving her Medrol Dosepak as she has history of type 2 diabetes mellitus and she did not did well with steroid pack last time  Work note given today.    - amoxicillin-clavulanate (AUGMENTIN) 875-125 MG Tab; Take 1 Tablet by mouth 2 times a day for 7 days.  Dispense: 14 Tablet; Refill: 0    2. Type 2 diabetes mellitus with hyperglycemia, with long-term current use of insulin ([facility])  Chronic problem, stable, continue follow-up with endocrinology.  Continue Ozempic 2 mg every 7 days and Tresiba 50 units at bedtime.    3.  Essential hypertension  Chronic problem, stable, blood pressure today 120/70.  Continue lisinopril 20 mg daily and amlodipine 5 mg daily.    Please note that this dictation was created using voice recognition software. I have made every reasonable attempt to correct obvious errors, but I expect that there are errors of grammar and possibly content that I did not discover before finalizing the note.    Follow up in 4/19/2023 with PCP for chronic medical conditions, sooner as needed if symptoms are not getting better

## 2023-04-19 ENCOUNTER — OFFICE VISIT (OUTPATIENT)
Dept: MEDICAL GROUP | Facility: MEDICAL CENTER | Age: 63
End: 2023-04-19
Payer: COMMERCIAL

## 2023-04-19 VITALS
TEMPERATURE: 98.1 F | BODY MASS INDEX: 38.62 KG/M2 | OXYGEN SATURATION: 98 % | DIASTOLIC BLOOD PRESSURE: 62 MMHG | HEIGHT: 57 IN | SYSTOLIC BLOOD PRESSURE: 120 MMHG | HEART RATE: 85 BPM | WEIGHT: 179.01 LBS

## 2023-04-19 DIAGNOSIS — E11.65 TYPE 2 DIABETES MELLITUS WITH HYPERGLYCEMIA, WITH LONG-TERM CURRENT USE OF INSULIN (HCC): ICD-10-CM

## 2023-04-19 DIAGNOSIS — M54.31 SCIATICA OF RIGHT SIDE: ICD-10-CM

## 2023-04-19 DIAGNOSIS — I10 ESSENTIAL HYPERTENSION: ICD-10-CM

## 2023-04-19 DIAGNOSIS — E78.5 DYSLIPIDEMIA: ICD-10-CM

## 2023-04-19 DIAGNOSIS — Z79.4 TYPE 2 DIABETES MELLITUS WITH HYPERGLYCEMIA, WITH LONG-TERM CURRENT USE OF INSULIN (HCC): ICD-10-CM

## 2023-04-19 DIAGNOSIS — M79.605 LEFT LEG PAIN: ICD-10-CM

## 2023-04-19 PROBLEM — J01.11 ACUTE RECURRENT FRONTAL SINUSITIS: Status: RESOLVED | Noted: 2022-10-19 | Resolved: 2023-04-19

## 2023-04-19 PROCEDURE — 99214 OFFICE O/P EST MOD 30 MIN: CPT | Performed by: PHYSICIAN ASSISTANT

## 2023-04-19 RX ORDER — IBUPROFEN 800 MG/1
800 TABLET ORAL EVERY 8 HOURS PRN
Qty: 90 TABLET | Refills: 1 | Status: SHIPPED | OUTPATIENT
Start: 2023-04-19 | End: 2023-05-19

## 2023-04-19 RX ORDER — AMLODIPINE BESYLATE 5 MG/1
5 TABLET ORAL DAILY
Qty: 90 TABLET | Refills: 3 | Status: SHIPPED | OUTPATIENT
Start: 2023-04-19

## 2023-04-19 RX ORDER — ROSUVASTATIN CALCIUM 40 MG/1
40 TABLET, COATED ORAL DAILY
Qty: 90 TABLET | Refills: 3 | Status: SHIPPED | OUTPATIENT
Start: 2023-04-19

## 2023-04-19 NOTE — PROGRESS NOTES
Subjective:   Rosario Blevins is a 62 y.o. female here today for right leg sciatica and diabetes.    Sciatica of right side  This is a pleasant 62-year-old female coming in today complaining of a 10-day history of right leg sciatica.  Denies any trauma.  Does have a history of an injury that caused her to have low back pain.  Denies any triggers recently.  States that getting off work would be helpful but she has been working nonetheless.  She is requesting a renewal of ibuprofen.  She ran out of that last week.  That medication is typically effective for her chronic left leg pain.  She denies any saddle anesthesia.  She is hoping with time that her symptoms will improve.  She has an appointment next month with endocrinology.  She will perform labs prior to her appointment.  She was doing well with her medications.       Current medicines (including changes today)  Current Outpatient Medications   Medication Sig Dispense Refill    ibuprofen (MOTRIN) 800 MG Tab Take 1 Tablet by mouth every 8 hours as needed for Moderate Pain for up to 30 days. 90 Tablet 1    rosuvastatin (CRESTOR) 40 MG tablet Take 1 Tablet by mouth every day. 90 Tablet 3    amLODIPine (NORVASC) 5 MG Tab Take 1 Tablet by mouth every day. 90 Tablet 3    lisinopril (PRINIVIL) 20 MG Tab Take 1 Tablet by mouth every day. 90 Tablet 2    omeprazole (PRILOSEC) 40 MG delayed-release capsule Take 1 Capsule by mouth every day. 90 Capsule 2    estradiol (ESTRACE) 1 MG Tab Take 1 Tablet by mouth every day. 90 Tablet 2    TRESIBA FLEXTOUCH 200 UNIT/ML Solution Pen-injector Inject 50 Units as directed every day. 27 mL 3    Semaglutide, 2 MG/DOSE, (OZEMPIC, 2 MG/DOSE,) 8 MG/3ML Solution Pen-injector Inject 2 mg under the skin every 7 days. 3 mL 5    Multiple Vitamins-Minerals (CENTRUM SILVER PO) Take  by mouth every day.       No current facility-administered medications for this visit.     She  has a past medical history of Diabetes, Hyperlipidemia, and  "Hypertension.    Social History and Family History were reviewed and updated.    ROS   No chest pain, no shortness of breath, no abdominal pain and all other systems were reviewed and are negative.       Objective:     /62 (BP Location: Left arm, Patient Position: Sitting, BP Cuff Size: Adult)   Pulse 85   Temp 36.7 °C (98.1 °F) (Temporal)   Ht 1.448 m (4' 9\")   Wt 81.2 kg (179 lb 0.2 oz)   SpO2 98%  Body mass index is 38.74 kg/m².   Physical Exam:  Constitutional: Alert, no distress.  Skin: Warm, dry, good turgor, no rashes in visible areas.  Eye: Equal, round and reactive, conjunctiva clear, lids normal.  ENMT: Lips without lesions, good dentition, oropharynx clear.  Neck: Trachea midline, no masses.   Lymph: No cervical or supraclavicular lymphadenopathy  Respiratory: Unlabored respiratory effort, lungs appear clear.  Psych: Alert and oriented x3, normal affect and mood.        Assessment and Plan:   The following treatment plan was discussed    1. Sciatica of right side  Acute, new onset condition.  Prescribed ibuprofen as directed.  Discussed stretching exercises.  We will hold off any referral to PT.  Discussed possible prescription with a steroid but that would cause her glucose levels to spike.  We will continue to monitor symptoms.  Hopefully in the next week or so her symptoms will resolve.  - ibuprofen (MOTRIN) 800 MG Tab; Take 1 Tablet by mouth every 8 hours as needed for Moderate Pain for up to 30 days.  Dispense: 90 Tablet; Refill: 1    2. Left leg pain  Chronic condition.  Stable.  Renewed ibuprofen as directed.    3. Type 2 diabetes mellitus with hyperglycemia, with long-term current use of insulin (HCC)  Chronic condition.  Stable.  Follow-up with endocrinology next month.  Reviewed medications list.  Follow-up with labs prior to endocrinology appointment.    4. Dyslipidemia  Chronic condition.  Likely well controlled.  Renewed Crestor as directed.  - rosuvastatin (CRESTOR) 40 MG tablet; " Take 1 Tablet by mouth every day.  Dispense: 90 Tablet; Refill: 3    5. Essential hypertension  Chronic condition.  Stable.  Continue lisinopril.  Renewed amlodipine.  Blood pressure well controlled today.  - amLODIPine (NORVASC) 5 MG Tab; Take 1 Tablet by mouth every day.  Dispense: 90 Tablet; Refill: 3         Followup: No follow-ups on file.    Please note that this dictation was created using voice recognition software. I have made every reasonable attempt to correct obvious errors, but I expect that there are errors of grammar and possibly content that I did not discover before finalizing the note.

## 2023-04-19 NOTE — ASSESSMENT & PLAN NOTE
This is a pleasant 62-year-old female coming in today complaining of a 10-day history of right leg sciatica.  Denies any trauma.  Does have a history of an injury that caused her to have low back pain.  Denies any triggers recently.  States that getting off work would be helpful but she has been working nonetheless.  She is requesting a renewal of ibuprofen.  She ran out of that last week.  That medication is typically effective for her chronic left leg pain.  She denies any saddle anesthesia.  She is hoping with time that her symptoms will improve.  She has an appointment next month with endocrinology.  She will perform labs prior to her appointment.  She was doing well with her medications.

## 2023-04-25 DIAGNOSIS — M54.31 SCIATICA OF RIGHT SIDE: ICD-10-CM

## 2023-04-25 RX ORDER — METHYLPREDNISOLONE 4 MG/1
TABLET ORAL
Qty: 21 TABLET | Refills: 0 | Status: SHIPPED
Start: 2023-04-25 | End: 2023-05-10

## 2023-05-04 LAB
25(OH)D3+25(OH)D2 SERPL-MCNC: 25.9 NG/ML (ref 30–100)
ALBUMIN SERPL-MCNC: 3.8 G/DL (ref 3.8–4.8)
ALBUMIN/CREAT UR: 31 MG/G CREAT (ref 0–29)
ALBUMIN/GLOB SERPL: 1.3 {RATIO} (ref 1.2–2.2)
ALP SERPL-CCNC: 50 IU/L (ref 44–121)
ALT SERPL-CCNC: 42 IU/L (ref 0–32)
AST SERPL-CCNC: 31 IU/L (ref 0–40)
BILIRUB SERPL-MCNC: 0.5 MG/DL (ref 0–1.2)
BUN SERPL-MCNC: 21 MG/DL (ref 8–27)
BUN/CREAT SERPL: 39 (ref 12–28)
CALCIUM SERPL-MCNC: 9.7 MG/DL (ref 8.7–10.3)
CHLORIDE SERPL-SCNC: 101 MMOL/L (ref 96–106)
CHOLEST SERPL-MCNC: 192 MG/DL (ref 100–199)
CO2 SERPL-SCNC: 23 MMOL/L (ref 20–29)
CREAT SERPL-MCNC: 0.54 MG/DL (ref 0.57–1)
CREAT UR-MCNC: 24.2 MG/DL
EGFRCR SERPLBLD CKD-EPI 2021: 104 ML/MIN/1.73
GLOBULIN SER CALC-MCNC: 2.9 G/DL (ref 1.5–4.5)
GLUCOSE SERPL-MCNC: 114 MG/DL (ref 70–99)
HDLC SERPL-MCNC: 46 MG/DL
LABORATORY COMMENT REPORT: ABNORMAL
LDLC SERPL CALC-MCNC: 117 MG/DL (ref 0–99)
MICROALBUMIN UR-MCNC: 7.5 UG/ML
POTASSIUM SERPL-SCNC: 4.6 MMOL/L (ref 3.5–5.2)
PROT SERPL-MCNC: 6.7 G/DL (ref 6–8.5)
SODIUM SERPL-SCNC: 136 MMOL/L (ref 134–144)
T4 FREE SERPL-MCNC: 1.55 NG/DL (ref 0.82–1.77)
TRIGL SERPL-MCNC: 162 MG/DL (ref 0–149)
TSH SERPL DL<=0.005 MIU/L-ACNC: 1.17 UIU/ML (ref 0.45–4.5)
VLDLC SERPL CALC-MCNC: 29 MG/DL (ref 5–40)

## 2023-05-10 ENCOUNTER — OFFICE VISIT (OUTPATIENT)
Dept: ENDOCRINOLOGY | Facility: MEDICAL CENTER | Age: 63
End: 2023-05-10
Attending: INTERNAL MEDICINE
Payer: COMMERCIAL

## 2023-05-10 ENCOUNTER — OFFICE VISIT (OUTPATIENT)
Dept: MEDICAL GROUP | Facility: MEDICAL CENTER | Age: 63
End: 2023-05-10
Payer: COMMERCIAL

## 2023-05-10 VITALS
HEART RATE: 90 BPM | RESPIRATION RATE: 17 BRPM | WEIGHT: 177 LBS | BODY MASS INDEX: 34.75 KG/M2 | DIASTOLIC BLOOD PRESSURE: 70 MMHG | HEIGHT: 60 IN | OXYGEN SATURATION: 98 % | SYSTOLIC BLOOD PRESSURE: 110 MMHG | TEMPERATURE: 97.9 F

## 2023-05-10 VITALS
BODY MASS INDEX: 34.32 KG/M2 | SYSTOLIC BLOOD PRESSURE: 120 MMHG | WEIGHT: 174.8 LBS | HEIGHT: 60 IN | OXYGEN SATURATION: 97 % | DIASTOLIC BLOOD PRESSURE: 68 MMHG | HEART RATE: 89 BPM

## 2023-05-10 DIAGNOSIS — E11.65 TYPE 2 DIABETES MELLITUS WITH HYPERGLYCEMIA, WITH LONG-TERM CURRENT USE OF INSULIN (HCC): ICD-10-CM

## 2023-05-10 DIAGNOSIS — E78.5 DYSLIPIDEMIA: ICD-10-CM

## 2023-05-10 DIAGNOSIS — Z79.4 LONG-TERM INSULIN USE (HCC): ICD-10-CM

## 2023-05-10 DIAGNOSIS — E11.9 CONTROLLED TYPE 2 DIABETES MELLITUS WITHOUT COMPLICATION, WITH LONG-TERM CURRENT USE OF INSULIN (HCC): ICD-10-CM

## 2023-05-10 DIAGNOSIS — E55.9 VITAMIN D DEFICIENCY: ICD-10-CM

## 2023-05-10 DIAGNOSIS — Z79.4 TYPE 2 DIABETES MELLITUS WITH HYPERGLYCEMIA, WITH LONG-TERM CURRENT USE OF INSULIN (HCC): ICD-10-CM

## 2023-05-10 DIAGNOSIS — Z79.4 CONTROLLED TYPE 2 DIABETES MELLITUS WITHOUT COMPLICATION, WITH LONG-TERM CURRENT USE OF INSULIN (HCC): ICD-10-CM

## 2023-05-10 DIAGNOSIS — M54.41 ACUTE RIGHT-SIDED LOW BACK PAIN WITH RIGHT-SIDED SCIATICA: ICD-10-CM

## 2023-05-10 DIAGNOSIS — M54.31 SCIATICA OF RIGHT SIDE: ICD-10-CM

## 2023-05-10 LAB
HBA1C MFR BLD: 6.7 % (ref ?–5.8)
POCT INT CON NEG: NEGATIVE
POCT INT CON POS: POSITIVE

## 2023-05-10 PROCEDURE — 83036 HEMOGLOBIN GLYCOSYLATED A1C: CPT | Performed by: INTERNAL MEDICINE

## 2023-05-10 PROCEDURE — 99212 OFFICE O/P EST SF 10 MIN: CPT | Performed by: INTERNAL MEDICINE

## 2023-05-10 PROCEDURE — 99214 OFFICE O/P EST MOD 30 MIN: CPT | Performed by: PHYSICIAN ASSISTANT

## 2023-05-10 PROCEDURE — 99214 OFFICE O/P EST MOD 30 MIN: CPT | Performed by: INTERNAL MEDICINE

## 2023-05-10 RX ORDER — INSULIN DEGLUDEC 200 U/ML
50 INJECTION, SOLUTION SUBCUTANEOUS DAILY
Qty: 27 ML | Refills: 3 | Status: SHIPPED
Start: 2023-05-10 | End: 2023-11-01

## 2023-05-10 RX ORDER — GABAPENTIN 100 MG/1
100 CAPSULE ORAL 3 TIMES DAILY
Qty: 90 CAPSULE | Refills: 2 | Status: SHIPPED | OUTPATIENT
Start: 2023-05-10 | End: 2023-06-09

## 2023-05-10 RX ORDER — KETOROLAC TROMETHAMINE 30 MG/ML
60 INJECTION, SOLUTION INTRAMUSCULAR; INTRAVENOUS ONCE
Status: DISCONTINUED | OUTPATIENT
Start: 2023-05-10 | End: 2023-05-10

## 2023-05-10 RX ORDER — KETOROLAC TROMETHAMINE 30 MG/ML
30 INJECTION, SOLUTION INTRAMUSCULAR; INTRAVENOUS ONCE
Status: COMPLETED | OUTPATIENT
Start: 2023-05-10 | End: 2023-05-10

## 2023-05-10 RX ORDER — SEMAGLUTIDE 2.68 MG/ML
2 INJECTION, SOLUTION SUBCUTANEOUS
Qty: 3 ML | Refills: 5 | Status: SHIPPED
Start: 2023-05-10 | End: 2023-11-01

## 2023-05-10 RX ADMIN — KETOROLAC TROMETHAMINE 30 MG: 30 INJECTION, SOLUTION INTRAMUSCULAR; INTRAVENOUS at 08:11

## 2023-05-10 NOTE — PROGRESS NOTES
CHIEF COMPLAINT: Patient is here for follow up of Type 2 Diabetes Mellitus    HPI:     Rosario Blevins is a 62 y.o. female with Type 2 Diabetes Mellitus here for follow up.    Labs from 5/10/2023 show a1c is 7.1%  Labs from 11/2/2022 show a1c was 6.4%  Labs from 7/20/2022 show a1c was 7.3%  Labs from 4/6/2022 show a1c was 8.3%  Labs from October 6, 2021 show A1c was 6.8%  Labs from March 24, 2021 show A1c was 7%  Labs from 5/28/2020 show A1c was 7.2%      She has a history of intolerance of Metformin because of diarrhea and she had recurrent yeast infections with an SGLT2 inhibitor specifically Farxiga        She is taking   Tresiba 50u daily, and   Ozempic 2.0 mg once a week      She denies SE with her meds  She didn't bring her meter but she reports fasting BG are less than 100   She has low back pain and got a toradol shot           She has no history of CAD  She has hyperlipidemia and is on generic Crestor 40mg daily  She denies symptoms of statin related myopathy  Her LDL-C was 117 on 5/2023  Her LDL cholesterol was 86 on 10/13/2022        She has essential hypertension is taking lisinopril  She is tolerating her medication fairly well  She does not have diabetic nephropathy at baseline  UACR was 31 on 5/2023  UACR was < 30 on 3/31/2022      She has no diabetic retinopathy   Eye exam was on 8/2022        BG Diary:  Patient forgot her meter    Weight has been stable    Diabetes Complications   Retinopathy: No known retinopathy.  Last eye exam: 8/2022 with Dr. Taylor  Neuropathy: Denies paresthesias or numbness in hands or feet. Denies any foot wounds.  Exercise: Minimal.  Diet: Fair.  Patient's medications, allergies, and social histories were reviewed and updated as appropriate.    ROS:     CONS:     No fever, no chills   EYES:     No diplopia, no blurry vision   CV:           No chest pain, no palpitations   PULM:     No SOB, no cough, no hemoptysis.   GI:            No nausea, no vomiting, no diarrhea, no  constipation   ENDO:     No polyuria, no polydipsia, no heat intolerance, no cold intolerance       Past Medical History:  Problem List:  2023: Acute right-sided low back pain with right-sided sciatica  2023: Sciatica of right side  2022-10: Acute recurrent frontal sinusitis  2022: Left arm pain  2021: Acute pansinusitis  2021: Achilles tendon pain  2020: Long-term insulin use ([facility])  2019: Left leg pain  2019: Influenza vaccination given  2018-10: Lipoma of back  2018-10: History of shingles  2018: Obesity (BMI 30-39.9)  2018: GERD (gastroesophageal reflux disease)  2013: Type 2 diabetes mellitus with hyperglycemia, with long-term   current use of insulin ([facility])  2013: Essential hypertension  2013: Dyslipidemia      Past Surgical History:  Past Surgical History:   Procedure Laterality Date    CATARACT EXTRACTION WITH IOL Bilateral 2020    HYSTERECTOMY, TOTAL ABDOMINAL      US-NEEDLE CORE BX-BREAST PANEL          Allergies:  Milk products food allergy and Codeine     Social History:  Social History     Tobacco Use    Smoking status: Former     Types: Cigarettes     Quit date: 1991     Years since quittin.2    Smokeless tobacco: Never   Vaping Use    Vaping Use: Never used   Substance Use Topics    Alcohol use: No    Drug use: No        Family History:   family history includes Cancer in her mother; Heart Attack (age of onset: 38) in her maternal uncle; Heart Attack (age of onset: 40) in her maternal uncle; Heart Attack (age of onset: 50) in her maternal grandmother; No Known Problems in her father; Other in her brother and sister; Other (age of onset: 47) in her mother.      PHYSICAL EXAM:   OBJECTIVE:  Vital signs: /68   Pulse 89   Ht 1.524 m (5')   Wt 79.3 kg (174 lb 12.8 oz)   SpO2 97%   BMI 34.14 kg/m²   GENERAL: Well-developed, well-nourished in no apparent distress.   EYE:  No ocular asymmetry, PERRLA  HENT: Pink, moist mucous membranes.    NECK: No  thyromegaly.   CARDIOVASCULAR:  No murmurs  LUNGS: Clear breath sounds  ABDOMEN: Soft, nontender   EXTREMITIES: No clubbing, cyanosis, or edema.   NEUROLOGICAL: No gross focal motor abnormalities   LYMPH: No cervical adenopathy palpated.   SKIN: No rashes, she has a palpable soft, movable, lipoma on her upper back near the midline    Labs:  Lab Results   Component Value Date/Time    HBA1C 6.4 (A) 11/02/2022 10:02 AM        Lab Results   Component Value Date/Time    WBC 6.6 05/12/2016 06:49 AM    RBC 5.17 05/12/2016 06:49 AM    HEMOGLOBIN 14.8 05/12/2016 06:49 AM    MCV 87.2 05/12/2016 06:49 AM    MCH 28.6 05/12/2016 06:49 AM    MCHC 32.8 (L) 05/12/2016 06:49 AM    RDW 41.7 05/12/2016 06:49 AM    MPV 11.0 05/12/2016 06:49 AM       Lab Results   Component Value Date/Time    SODIUM 136 05/03/2023 04:28 AM    SODIUM 137 10/13/2022 07:23 AM    POTASSIUM 4.6 05/03/2023 04:28 AM    POTASSIUM 4.1 10/13/2022 07:23 AM    CHLORIDE 101 05/03/2023 04:28 AM    CHLORIDE 101 10/13/2022 07:23 AM    CO2 23 05/03/2023 04:28 AM    CO2 27 10/13/2022 07:23 AM    ANION 9.0 10/13/2022 07:23 AM    GLUCOSE 114 (H) 05/03/2023 04:28 AM    GLUCOSE 104 (H) 10/13/2022 07:23 AM    BUN 21 05/03/2023 04:28 AM    BUN 17 10/13/2022 07:23 AM    CREATININE 0.54 (L) 05/03/2023 04:28 AM    CREATININE 0.45 (L) 10/13/2022 07:23 AM    CALCIUM 9.7 05/03/2023 04:28 AM    CALCIUM 9.5 10/13/2022 07:23 AM    ASTSGOT 31 05/03/2023 04:28 AM    ASTSGOT 25 10/13/2022 07:23 AM    ALTSGPT 42 (H) 05/03/2023 04:28 AM    ALTSGPT 30 10/13/2022 07:23 AM    TBILIRUBIN 0.5 05/03/2023 04:28 AM    TBILIRUBIN 0.5 10/13/2022 07:23 AM    ALBUMIN 3.8 05/03/2023 04:28 AM    ALBUMIN 4.2 10/13/2022 07:23 AM    TOTPROTEIN 6.7 05/03/2023 04:28 AM    TOTPROTEIN 7.2 10/13/2022 07:23 AM    GLOBULIN 2.9 05/03/2023 04:28 AM    GLOBULIN 3.0 10/13/2022 07:23 AM    AGRATIO 1.3 05/03/2023 04:28 AM    AGRATIO 1.4 10/13/2022 07:23 AM       Lab Results   Component Value Date/Time    CHOLSTRLTOT 218  (H) 05/28/2020 1001    TRIGLYCERIDE 276 (H) 05/28/2020 1001    HDL 42 05/28/2020 1001     (H) 05/28/2020 1001       Lab Results   Component Value Date/Time    MALBCRT see below 03/31/2022 08:01 AM    MICROALBUR <1.2 03/31/2022 08:01 AM    MICRALB 7.5 05/03/2023 04:28 AM        Lab Results   Component Value Date/Time    TSHULTRASEN 1.580 05/12/2016 0649     No results found for: FREEDIR  No results found for: FREET3  No results found for: THYSTIMIG        ASSESSMENT/PLAN:     1. Type 2 diabetes mellitus with hyperglycemia, with long-term current use of insulin (HCC)  Improved control  A1c is fair at 7.1%  Continue Tresiba 50u daily  Continue Ozempic 2mg weekly  She has to take vitamin D3 5000IU daily   Follow up in 6 mos with Mary with labs     2. Dyslipidemia  Stable   LDL-C is at goal  Continue Crestor   Repeat fasting lipids in 12 months    3. Essential hypertension  Controlled  Repeat urine albumin in 6 mos    4. Long-term insulin use (HCC)  Patient is on long-term basal insulin therapy for type 2 diabetes      Return in about 6 months (around 11/10/2023).      Thank you kindly for allowing me to participate in the diabetes care plan for this patient.    Eris Almonte MD, FACE, NU      CC:   Yaya Frank P.A.-C.

## 2023-05-10 NOTE — ASSESSMENT & PLAN NOTE
Currently on Crestor 40 mg.  Last cholesterol profile was elevated.  LDL at 116.  She has been on Crestor for many years.

## 2023-05-10 NOTE — ASSESSMENT & PLAN NOTE
This is a pleasant 62-year-old female here today to follow-up on her back pain.  Approximately 3 weeks of pain now.  I saw her initially.  She states her pain is manageable but very uncomfortable throughout the day.  Does worsen later on with her shift.  She must work though.  Has been taking ibuprofen 800 mg as needed.  May take it twice a day.  Breaks up the dosing though.  Pain is still on the right side radiating down to the leg.  Does have more mobility but sleeping is difficult and waking up in the morning it is very difficult to walk.

## 2023-05-10 NOTE — PROGRESS NOTES
Subjective:   Rosario Blevins is a 62 y.o. female here today for acute right-sided back pain with sciatica and dyslipidemia.    Acute right-sided low back pain with right-sided sciatica  This is a pleasant 62-year-old female here today to follow-up on her back pain.  Approximately 3 weeks of pain now.  I saw her initially.  She states her pain is manageable but very uncomfortable throughout the day.  Does worsen later on with her shift.  She must work though.  Has been taking ibuprofen 800 mg as needed.  May take it twice a day.  Breaks up the dosing though.  Pain is still on the right side radiating down to the leg.  Does have more mobility but sleeping is difficult and waking up in the morning it is very difficult to walk.    Dyslipidemia  Currently on Crestor 40 mg.  Last cholesterol profile was elevated.  LDL at 116.  She has been on Crestor for many years.       Current medicines (including changes today)  Current Outpatient Medications   Medication Sig Dispense Refill    gabapentin (NEURONTIN) 100 MG Cap Take 1 Capsule by mouth 3 times a day for 30 days. 90 Capsule 2    ibuprofen (MOTRIN) 800 MG Tab Take 1 Tablet by mouth every 8 hours as needed for Moderate Pain for up to 30 days. 90 Tablet 1    rosuvastatin (CRESTOR) 40 MG tablet Take 1 Tablet by mouth every day. 90 Tablet 3    amLODIPine (NORVASC) 5 MG Tab Take 1 Tablet by mouth every day. 90 Tablet 3    lisinopril (PRINIVIL) 20 MG Tab Take 1 Tablet by mouth every day. 90 Tablet 2    omeprazole (PRILOSEC) 40 MG delayed-release capsule Take 1 Capsule by mouth every day. 90 Capsule 2    estradiol (ESTRACE) 1 MG Tab Take 1 Tablet by mouth every day. 90 Tablet 2    TRESIBA FLEXTOUCH 200 UNIT/ML Solution Pen-injector Inject 50 Units as directed every day. 27 mL 3    Semaglutide, 2 MG/DOSE, (OZEMPIC, 2 MG/DOSE,) 8 MG/3ML Solution Pen-injector Inject 2 mg under the skin every 7 days. 3 mL 5    Multiple Vitamins-Minerals (CENTRUM SILVER PO) Take  by mouth every  day.       Current Facility-Administered Medications   Medication Dose Route Frequency Provider Last Rate Last Admin    ketorolac (TORADOL) injection 60 mg  60 mg Intramuscular Once Yaya Frank P.A.-C.         She  has a past medical history of Diabetes, Hyperlipidemia, and Hypertension.    Social History and Family History were reviewed and updated.    ROS   No chest pain, no shortness of breath, no abdominal pain and all other systems were reviewed and are negative.       Objective:     /70 (BP Location: Right arm, Patient Position: Sitting, BP Cuff Size: Adult)   Pulse 90   Temp 36.6 °C (97.9 °F) (Temporal)   Resp 17   Ht 1.524 m (5')   Wt 80.3 kg (177 lb)   SpO2 98%  Body mass index is 34.57 kg/m².   Physical Exam:  Constitutional: Alert, no distress.  Skin: Warm, dry, good turgor, no rashes in visible areas.  Eye: Equal, round and reactive, conjunctiva clear, lids normal.  ENMT: Lips without lesions, good dentition, oropharynx clear.  Neck: Trachea midline, no masses.   Lymph: No cervical or supraclavicular lymphadenopathy  Respiratory: Unlabored respiratory effort, lungs appear clear.  Psych: Alert and oriented x3, normal affect and mood.        Assessment and Plan:   The following treatment plan was discussed    1. Acute right-sided low back pain with right-sided sciatica  Acute, new onset condition.  Still with significant discomfort.  Does appear to be slightly improved from previous office visit.  Ordered x-ray of her spine.  Referred to physiatry.  We will start gabapentin.  She is aware of side effects.  Start with 100 mg prior to bedtime then increase as needed.  May take second tablet in the morning.  Provided Toradol injection today.  Maximum dosing at 60 mg.  Continue ibuprofen at 800 mg every 6 hours for the next 3 to 5 days.  Continue stretching exercises.  May need MRI in the future if symptoms do not improve.  - DX-LUMBAR SPINE-2 OR 3 VIEWS; Future  - Referral to Pain Clinic  -  gabapentin (NEURONTIN) 100 MG Cap; Take 1 Capsule by mouth 3 times a day for 30 days.  Dispense: 90 Capsule; Refill: 2  - ketorolac (TORADOL) injection 60 mg    2. Sciatica of right side  Acute, new onset condition.  Please see above.  - DX-LUMBAR SPINE-2 OR 3 VIEWS; Future  - Referral to Pain Clinic  - gabapentin (NEURONTIN) 100 MG Cap; Take 1 Capsule by mouth 3 times a day for 30 days.  Dispense: 90 Capsule; Refill: 2  - ketorolac (TORADOL) injection 60 mg    3. Dyslipidemia  Chronic condition.  Uncontrolled.  LDL still elevated despite Crestor 40 mg.  Discussed possible referral to vascular medicine.  She has an appointment today with endocrinology.         Followup: Return if symptoms worsen or fail to improve.    Please note that this dictation was created using voice recognition software. I have made every reasonable attempt to correct obvious errors, but I expect that there are errors of grammar and possibly content that I did not discover before finalizing the note.

## 2023-05-11 ENCOUNTER — HOSPITAL ENCOUNTER (OUTPATIENT)
Dept: RADIOLOGY | Facility: MEDICAL CENTER | Age: 63
End: 2023-05-11
Attending: PHYSICIAN ASSISTANT
Payer: COMMERCIAL

## 2023-05-11 DIAGNOSIS — M54.31 SCIATICA OF RIGHT SIDE: ICD-10-CM

## 2023-05-11 DIAGNOSIS — M54.41 ACUTE RIGHT-SIDED LOW BACK PAIN WITH RIGHT-SIDED SCIATICA: ICD-10-CM

## 2023-05-11 PROCEDURE — 72100 X-RAY EXAM L-S SPINE 2/3 VWS: CPT

## 2023-05-24 ENCOUNTER — OFFICE VISIT (OUTPATIENT)
Dept: PHYSICAL MEDICINE AND REHAB | Facility: MEDICAL CENTER | Age: 63
End: 2023-05-24
Payer: COMMERCIAL

## 2023-05-24 VITALS
WEIGHT: 174.8 LBS | DIASTOLIC BLOOD PRESSURE: 60 MMHG | OXYGEN SATURATION: 97 % | HEART RATE: 93 BPM | SYSTOLIC BLOOD PRESSURE: 114 MMHG | TEMPERATURE: 97.5 F | BODY MASS INDEX: 34.32 KG/M2 | HEIGHT: 60 IN

## 2023-05-24 DIAGNOSIS — M54.50 LOW BACK PAIN WITH RADIATION: ICD-10-CM

## 2023-05-24 DIAGNOSIS — E66.9 OBESITY (BMI 30-39.9): ICD-10-CM

## 2023-05-24 DIAGNOSIS — R26.9 GAIT ABNORMALITY: ICD-10-CM

## 2023-05-24 DIAGNOSIS — R29.898 WEAKNESS OF BOTH LOWER EXTREMITIES: ICD-10-CM

## 2023-05-24 DIAGNOSIS — M25.652 HIP JOINT STIFFNESS, BILATERAL: ICD-10-CM

## 2023-05-24 DIAGNOSIS — M25.651 HIP JOINT STIFFNESS, BILATERAL: ICD-10-CM

## 2023-05-24 PROCEDURE — 3074F SYST BP LT 130 MM HG: CPT | Performed by: PHYSICAL MEDICINE & REHABILITATION

## 2023-05-24 PROCEDURE — 3078F DIAST BP <80 MM HG: CPT | Performed by: PHYSICAL MEDICINE & REHABILITATION

## 2023-05-24 PROCEDURE — 1125F AMNT PAIN NOTED PAIN PRSNT: CPT | Performed by: PHYSICAL MEDICINE & REHABILITATION

## 2023-05-24 PROCEDURE — 99204 OFFICE O/P NEW MOD 45 MIN: CPT | Performed by: PHYSICAL MEDICINE & REHABILITATION

## 2023-05-24 ASSESSMENT — PAIN SCALES - GENERAL: PAINLEVEL: 8=MODERATE-SEVERE PAIN

## 2023-05-24 ASSESSMENT — PATIENT HEALTH QUESTIONNAIRE - PHQ9: CLINICAL INTERPRETATION OF PHQ2 SCORE: 0

## 2023-05-24 NOTE — PROGRESS NOTES
New patient note    Physiatry (physical medicine and  Rehabilitation), interventional spine and sports medicine    Date of Service: 5/24/2023    Chief complaint:   Chief Complaint   Patient presents with    Establish Care     Sciatica right side        HISTORY    HPI: Rosario Blevins 62 y.o. female who presents today for evaluation of low back and right leg pain.  She reports that she has had episodes of low back pain, but these usually resolve.  [place] time around [place], she started to have more low back pain with radiating to the right knee.  No significant paresthesias.  Reports that heat has not helped.  Taking ibuprofen 800mg twice a day helps.  Gabapentin does not seem to be helpful.    She has not had physical therapy, but has been doing home exercises given to her by MECHE Lund for the last five weeks.    She has been diabetic since about 5706-6258 and was uncontrolled for some time, improved control now.  Reports that her gait has been abnormal for a few years.       Medical records review:  I reviewed the note from the referring provider Yaya Frank P.A.-* dated 05/10/2023    Previous treatments:    Physical Therapy: No, provider exercises    Medications the patient is tried: ibuprofen    Previous interventions: none     Previous surgeries to relieve the above pain:  none       ROS:   Red Flags ROS:   Fever, Chills, Sweats: Denies  Involuntary Weight Loss: Denies  Bladder Incontinence: Denies  Bowel Incontinence: Denies  Saddle Anesthesia: Denies    All other systems reviewed and negative.       PMHx:   Past Medical History:   Diagnosis Date    Diabetes     Hyperlipidemia     Hypertension        PSHx:   Past Surgical History:   Procedure Laterality Date    CATARACT EXTRACTION WITH IOL Bilateral 2020    HYSTERECTOMY, TOTAL ABDOMINAL      US-NEEDLE CORE BX-BREAST PANEL         Family history   Family History   Problem Relation Age of Onset    Other Mother 47        Liver cancer    Cancer  Mother         Liver CA    Other Sister         6 months    Other Brother         at birth    Heart Attack Maternal Uncle 40    Heart Attack Maternal Grandmother 50    Heart Attack Maternal Uncle 38    No Known Problems Father          Medications:   Current Outpatient Medications   Medication    gabapentin (NEURONTIN) 100 MG Cap    Semaglutide, 2 MG/DOSE, (OZEMPIC, 2 MG/DOSE,) 8 MG/3ML Solution Pen-injector    TRESIBA FLEXTOUCH 200 UNIT/ML Solution Pen-injector    rosuvastatin (CRESTOR) 40 MG tablet    amLODIPine (NORVASC) 5 MG Tab    lisinopril (PRINIVIL) 20 MG Tab    omeprazole (PRILOSEC) 40 MG delayed-release capsule    estradiol (ESTRACE) 1 MG Tab    Multiple Vitamins-Minerals (CENTRUM SILVER PO)     No current facility-administered medications for this visit.       Allergies:   Allergies   Allergen Reactions    Codeine Unspecified     Unknown side effects.    Farxiga [Dapagliflozin] Diarrhea     Diarrhea and UTIs    Metformin Diarrhea     Diarrhea    Milk Products Food Allergy Vomiting       Social Hx:   Social History     Socioeconomic History    Marital status: Single     Spouse name: Not on file    Number of children: Not on file    Years of education: Not on file    Highest education level: Not on file   Occupational History    Not on file   Tobacco Use    Smoking status: Former     Types: Cigarettes     Quit date: 1991     Years since quittin.2    Smokeless tobacco: Never   Vaping Use    Vaping Use: Never used   Substance and Sexual Activity    Alcohol use: No    Drug use: No    Sexual activity: Not on file     Comment: ,17-year male , no children   Other Topics Concern    Not on file   Social History Narrative    Works at [email or web address]     Financial Resource Strain: Not on file   Food Insecurity: Not on file   Transportation Needs: Not on file   Physical Activity: Not on file   Stress: Not on file   Social Connections: Not on file   Intimate  Partner Violence: Not on file   Housing Stability: Not on file         EXAMINATION     Physical Exam:   Vitals: /60 (BP Location: Right arm, Patient Position: Sitting, BP Cuff Size: Adult)   Pulse 93   Temp 36.4 °C (97.5 °F) (Temporal)   Ht 1.524 m (5')   Wt 79.3 kg (174 lb 12.8 oz)   SpO2 97%     Constitutional:   Body Habitus: Body mass index is 34.14 kg/m².  Cooperation: Fully cooperates with exam  Appearance: Well-groomed, well-nourished, not disheveled, in no acute distress    Eyes: No scleral icterus, no proptosis     ENT -no obvious auditory deficits, no facial droop    Skin -no rashes or lesions noted     Respiratory-  breathing comfortable on room air, no audible wheezing    Cardiovascular- capillary refills less than 2 seconds. No lower extremity edema is noted.     Psychiatric- alert and oriented ×3. Normal affect.     Gait - trendeleberg gait, no use of ambulatory device, antalgic. The patient can toe walk with ease. The patient can heel walk with ease..     Musculoskeletal -   Cervical spine   Inspection: No deformities of the skin over the cervical spine. No rashes or lesions.    No signs of muscular atrophy in bilateral upper extremities     No tenderness to palpation of the cervical facets    Thoracic/Lumbar Spine/Sacral Spine/Hips   Inspection: No evidence of atrophy in bilateral lower extremities throughout     ROM: decreased AROM with flexion, extension, lateral flexion, and rotation bilaterally, with pain     Palpation:   No tenderness to palpation in midline at T1-T12 levels. No tenderness to palpation in the left and right of the midline T1-L5  palpation over SI joint: negative bilaterally    palpation over buttock: negative bilaterally    palpation in hip or over the greater trochanters: negative bilaterally      Lumbar spine Special tests  Neuro tension  Straight leg test positive right, negative left      HIP  Range of motion in the hips is decreased internal and external rotation  bilaterally    SI joint tests  Observation patient sits on one buttocks: Negative  NORIS test positive right, negative left         Neuro       Key points for the international standards for neurological classification of spinal cord injury (ISNCSCI) to light touch.     Dermatome R L   C4 2  2   C5 2 2   C6 2 2   C7 2 2   C8 2 2   T1 2 2   T2 2 2   L2 2 2   L3 2 2   L4 2 2   L5 2 2   S1 2 2   S2 2 2           Motor Exam Upper Extremities   ? Myotome R L   Shoulder flexion C5  5 5   Elbow flexion C5 5 5   Wrist extension C6 5 5   Elbow extension C7 5 5   Finger flexion C8 5 5   Finger abduction T1 5 5         Motor Exam Lower Extremities    ? Myotome R L   Hip flexion L2 4 4   Knee extension L3 4 4   Ankle dorsiflexion L4 4 4   Hip abduction L5 4 4   Toe extension L5 4 4   Ankle plantarflexion S1 4 4         Enrique’s sign negative bilaterally   Babinski sign negative bilaterally   Clonus of the ankle negative bilaterally     Reflexes  ?  R L   Biceps  2+ 2+   Brachioradialis  2+ 2+   Patella  2+ 2+   Achilles   2+ 2+       MEDICAL DECISION MAKING    Medical records review: see under HPI section.     DATA    Labs:   Lab Results   Component Value Date/Time    SODIUM 136 05/03/2023 04:28 AM    SODIUM 137 10/13/2022 07:23 AM    POTASSIUM 4.6 05/03/2023 04:28 AM    POTASSIUM 4.1 10/13/2022 07:23 AM    CHLORIDE 101 05/03/2023 04:28 AM    CHLORIDE 101 10/13/2022 07:23 AM    CO2 23 05/03/2023 04:28 AM    CO2 27 10/13/2022 07:23 AM    ANION 9.0 10/13/2022 07:23 AM    GLUCOSE 114 (H) 05/03/2023 04:28 AM    GLUCOSE 104 (H) 10/13/2022 07:23 AM    BUN 21 05/03/2023 04:28 AM    BUN 17 10/13/2022 07:23 AM    CREATININE 0.54 (L) 05/03/2023 04:28 AM    CREATININE 0.45 (L) 10/13/2022 07:23 AM    CALCIUM 9.7 05/03/2023 04:28 AM    CALCIUM 9.5 10/13/2022 07:23 AM    ASTSGOT 31 05/03/2023 04:28 AM    ASTSGOT 25 10/13/2022 07:23 AM    ALTSGPT 42 (H) 05/03/2023 04:28 AM    ALTSGPT 30 10/13/2022 07:23 AM    TBILIRUBIN 0.5 05/03/2023 04:28  AM    TBILIRUBIN 0.5 10/13/2022 07:23 AM    ALBUMIN 3.8 05/03/2023 04:28 AM    ALBUMIN 4.2 10/13/2022 07:23 AM    TOTPROTEIN 6.7 05/03/2023 04:28 AM    TOTPROTEIN 7.2 10/13/2022 07:23 AM    GLOBULIN 2.9 05/03/2023 04:28 AM    GLOBULIN 3.0 10/13/2022 07:23 AM    AGRATIO 1.3 05/03/2023 04:28 AM    AGRATIO 1.4 10/13/2022 07:23 AM       No results found for: PROTHROMBTM, INR     Lab Results   Component Value Date/Time    WBC 6.6 05/12/2016 06:49 AM    RBC 5.17 05/12/2016 06:49 AM    HEMOGLOBIN 14.8 05/12/2016 06:49 AM    HEMATOCRIT 45.1 05/12/2016 06:49 AM    MCV 87.2 05/12/2016 06:49 AM    MCH 28.6 05/12/2016 06:49 AM    MCHC 32.8 (L) 05/12/2016 06:49 AM    MPV 11.0 05/12/2016 06:49 AM    NEUTSPOLYS 44.70 10/07/2015 07:29 AM    LYMPHOCYTES 45.70 (H) 10/07/2015 07:29 AM    MONOCYTES 7.20 10/07/2015 07:29 AM    EOSINOPHILS 1.60 10/07/2015 07:29 AM    BASOPHILS 0.60 10/07/2015 07:29 AM    HYPOCHROMIA 1+ 02/05/2014 07:33 AM        Lab Results   Component Value Date/Time    HBA1C 6.7 (A) 05/10/2023 11:25 AM        Imaging: I personally reviewed following images, these are my reads  Xray lumbar spine 05/11/2023  There is degenerative disc disease at L2-3 and mild degenerative changes lower.  No alignment abnormalities.    IMAGING radiology reads. I reviewed the following radiology reads   Results for orders placed during the hospital encounter of 05/11/23    DX-LUMBAR SPINE-2 OR 3 VIEWS    Impression  No acute osseous abnormality.  No malalignment.  Moderate degenerative change of the lumbar spine, most at L2-3.                          Diagnosis   Visit Diagnoses     ICD-10-CM   1. Low back pain with radiation  M54.50   2. Hip joint stiffness, bilateral  M25.651    M25.652   3. Gait abnormality  R26.9   4. Weakness of both lower extremities  R29.898   5. Obesity (BMI 30-39.9)  E66.9           ASSESSMENT:  Rosario Blevins 62 y.o. female seen for above     Rosario was seen today for establish care.    Diagnoses and all  orders for this visit:    Low back pain with radiation  -     MR-LUMBAR SPINE-W/O; Future    Hip joint stiffness, bilateral  -     DX-HIP-BILATERAL-WITH PELVIS-3/4 VIEWS; Future    Gait abnormality  -     DX-HIP-BILATERAL-WITH PELVIS-3/4 VIEWS; Future    Weakness of both lower extremities  -     MR-LUMBAR SPINE-W/O; Future    Obesity (BMI 30-39.9)        Discussed findings on xray of the lumbar spine and home program that she has been doing at home.  Plan for MRI lumbar spine to further assess.  She has diffuse weakness in her lower extremities that is concerning.  Plan to reassess treatment plan after MRI.  Xrays of the hips ordered to assess for pathology.  Given her diffuse lower extremity weakness, it is possible that this is simply secondary to weakness vs. Hip pathology.    Encouraged her to continue with weight management efforts and follow-up with ongoing Endocrinology care with Dr. Almonte.  Discussed possible medication alternatives to gabapentin, but agreed to make assessment after imaging.      Follow-up: Return after MRI.      Thank you very much for asking me to participate in Rosario Blevins's care.  Please contact me with any questions or concerns.    Please note that this dictation was created using voice recognition software. I have made every reasonable attempt to correct obvious errors but there may be errors of grammar and content that I may have overlooked prior to finalization of this note.      Edgar Maxwell MD  Physical Medicine and Rehabilitation  Interventional Spine and Sports Physiatry  [facility]           Yaya Cabrera P.A.

## 2023-05-25 ENCOUNTER — APPOINTMENT (OUTPATIENT)
Dept: RADIOLOGY | Facility: MEDICAL CENTER | Age: 63
End: 2023-05-25
Attending: PHYSICAL MEDICINE & REHABILITATION
Payer: COMMERCIAL

## 2023-05-25 DIAGNOSIS — R26.9 GAIT ABNORMALITY: ICD-10-CM

## 2023-05-25 DIAGNOSIS — M25.651 HIP JOINT STIFFNESS, BILATERAL: ICD-10-CM

## 2023-05-25 DIAGNOSIS — M25.652 HIP JOINT STIFFNESS, BILATERAL: ICD-10-CM

## 2023-05-25 PROCEDURE — 73522 X-RAY EXAM HIPS BI 3-4 VIEWS: CPT

## 2023-05-31 ENCOUNTER — APPOINTMENT (OUTPATIENT)
Dept: RADIOLOGY | Facility: MEDICAL CENTER | Age: 63
End: 2023-05-31
Attending: PHYSICAL MEDICINE & REHABILITATION
Payer: COMMERCIAL

## 2023-05-31 DIAGNOSIS — R29.898 WEAKNESS OF BOTH LOWER EXTREMITIES: ICD-10-CM

## 2023-05-31 DIAGNOSIS — M54.50 LOW BACK PAIN WITH RADIATION: ICD-10-CM

## 2023-05-31 PROCEDURE — 72148 MRI LUMBAR SPINE W/O DYE: CPT

## 2023-06-22 ENCOUNTER — OFFICE VISIT (OUTPATIENT)
Dept: PHYSICAL MEDICINE AND REHAB | Facility: MEDICAL CENTER | Age: 63
End: 2023-06-22
Payer: COMMERCIAL

## 2023-06-22 VITALS
DIASTOLIC BLOOD PRESSURE: 60 MMHG | HEIGHT: 60 IN | TEMPERATURE: 97.8 F | HEART RATE: 87 BPM | BODY MASS INDEX: 33.81 KG/M2 | WEIGHT: 172.2 LBS | SYSTOLIC BLOOD PRESSURE: 100 MMHG | OXYGEN SATURATION: 98 %

## 2023-06-22 DIAGNOSIS — M47.816 LUMBAR SPONDYLOSIS: ICD-10-CM

## 2023-06-22 DIAGNOSIS — M51.36 DDD (DEGENERATIVE DISC DISEASE), LUMBAR: ICD-10-CM

## 2023-06-22 DIAGNOSIS — M16.0 PRIMARY OSTEOARTHRITIS OF BOTH HIPS: ICD-10-CM

## 2023-06-22 DIAGNOSIS — M48.061 SPINAL STENOSIS OF LUMBAR REGION, UNSPECIFIED WHETHER NEUROGENIC CLAUDICATION PRESENT: ICD-10-CM

## 2023-06-22 PROCEDURE — 99214 OFFICE O/P EST MOD 30 MIN: CPT | Performed by: PHYSICAL MEDICINE & REHABILITATION

## 2023-06-22 PROCEDURE — 3074F SYST BP LT 130 MM HG: CPT | Performed by: PHYSICAL MEDICINE & REHABILITATION

## 2023-06-22 PROCEDURE — 1126F AMNT PAIN NOTED NONE PRSNT: CPT | Performed by: PHYSICAL MEDICINE & REHABILITATION

## 2023-06-22 PROCEDURE — 3078F DIAST BP <80 MM HG: CPT | Performed by: PHYSICAL MEDICINE & REHABILITATION

## 2023-06-22 ASSESSMENT — PAIN SCALES - GENERAL: PAINLEVEL: NO PAIN

## 2023-06-22 NOTE — PROGRESS NOTES
Follow-up patient note    Physiatry (physical medicine and  Rehabilitation), interventional spine and sports medicine    Date of Service: 06/22/2023    Chief complaint:   Chief Complaint   Patient presents with    Follow-Up     Mri, sciatic nerve       HISTORY    HPI: Rosario Blevins 62 y.o. female with long-standing diabetes mellitus who presents today for evaluation of low back and right leg pain.      Rosario reports that she has been doing pretty well.  She does think that her pain is better than it was, approximately 80% better.  She has some days that cause more pain than others.  Reports that she has been taking advil and this helps with pain.  She does try to limit this, but when pain is severe, she takes up to 1600mg a day in divided doses.    Gabapentin was not helpful and she has not been taking this.    Doing some exercise as tolerated, but generally reports that she does not have a regular home program.    She has been diabetic since about 5426-2876 and was uncontrolled for some time, improved control now.  Reports that her gait has been abnormal for a few years.       Medical records review:  I reviewed the note from the referring provider Yaya Frank P.A.-* dated 05/10/2023    Previous treatments:    Physical Therapy: No, provider exercises    Medications the patient is tried: ibuprofen    Previous interventions: none     Previous surgeries to relieve the above pain:  none       ROS:   Red Flags ROS:   Fever, Chills, Sweats: Denies  Involuntary Weight Loss: Denies  Bladder Incontinence: Denies  Bowel Incontinence: Denies  Saddle Anesthesia: Denies    All other systems reviewed and negative.       PMHx:   Past Medical History:   Diagnosis Date    Diabetes     Hyperlipidemia     Hypertension        PSHx:   Past Surgical History:   Procedure Laterality Date    CATARACT EXTRACTION WITH IOL Bilateral 2020    HYSTERECTOMY, TOTAL ABDOMINAL      US-NEEDLE CORE BX-BREAST PANEL         Family history    Family History   Problem Relation Age of Onset    Other Mother 47        Liver cancer    Cancer Mother         Liver CA    Other Sister         6 months    Other Brother         at birth    Heart Attack Maternal Uncle 40    Heart Attack Maternal Grandmother 50    Heart Attack Maternal Uncle 38    No Known Problems Father          Medications:   Current Outpatient Medications   Medication    Semaglutide, 2 MG/DOSE, (OZEMPIC, 2 MG/DOSE,) 8 MG/3ML Solution Pen-injector    TRESIBA FLEXTOUCH 200 UNIT/ML Solution Pen-injector    rosuvastatin (CRESTOR) 40 MG tablet    amLODIPine (NORVASC) 5 MG Tab    lisinopril (PRINIVIL) 20 MG Tab    omeprazole (PRILOSEC) 40 MG delayed-release capsule    estradiol (ESTRACE) 1 MG Tab    Multiple Vitamins-Minerals (CENTRUM SILVER PO)     No current facility-administered medications for this visit.       Allergies:   Allergies   Allergen Reactions    Codeine Unspecified     Unknown side effects.    Farxiga [Dapagliflozin] Diarrhea     Diarrhea and UTIs    Metformin Diarrhea     Diarrhea    Milk Products Food Allergy Vomiting       Social Hx:   Social History     Socioeconomic History    Marital status: Single     Spouse name: Not on file    Number of children: Not on file    Years of education: Not on file    Highest education level: Not on file   Occupational History    Not on file   Tobacco Use    Smoking status: Former     Types: Cigarettes     Quit date: 1991     Years since quittin.3    Smokeless tobacco: Never   Vaping Use    Vaping Use: Never used   Substance and Sexual Activity    Alcohol use: No    Drug use: No    Sexual activity: Not on file     Comment: ,17-year male , no children   Other Topics Concern    Not on file   Social History Narrative    Works at [employer]     Financial Resource Strain: Not on file   Food Insecurity: Not on file   Transportation Needs: Not on file   Physical Activity: Not on file    Stress: Not on file   Social Connections: Not on file   Intimate Partner Violence: Not on file   Housing Stability: Not on file         EXAMINATION     Physical Exam:   Vitals: /60 (BP Location: Left arm, Patient Position: Sitting, BP Cuff Size: Large adult)   Pulse 87   Temp 36.6 °C (97.8 °F) (Temporal)   Ht 1.524 m (5')   Wt 78.1 kg (172 lb 3.2 oz)   SpO2 98%     Constitutional:   Body Habitus: Body mass index is 33.63 kg/m².  Cooperation: Fully cooperates with exam  Appearance: Well-groomed, well-nourished, not disheveled, in no acute distress    Eyes: No scleral icterus, no proptosis     ENT -no obvious auditory deficits, no facial droop    Skin -no rashes or lesions noted     Respiratory-  breathing comfortable on room air, no audible wheezing    Cardiovascular- No lower extremity edema is noted.     Psychiatric- alert and oriented ×3. Normal affect.     Gait - trendeleberg gait, no use of ambulatory device, antalgic.     Musculoskeletal -   Cervical spine   Inspection: No deformities of the skin over the cervical spine. No rashes or lesions.    No signs of muscular atrophy in bilateral upper extremities     No tenderness to palpation of the cervical facets    Thoracic/Lumbar Spine/Sacral Spine/Hips   Inspection: No evidence of atrophy in bilateral lower extremities throughout     ROM: decreased AROM with flexion, extension, lateral flexion, and rotation bilaterally, with pain     Lumbar spine Special tests  Neuro tension  Seated SLR is negative bilaterally    HIP  Range of motion in the hips is decreased internal and external rotation bilaterally      Neuro       Key points for the international standards for neurological classification of spinal cord injury (ISNCSCI) to light touch.     Dermatome R L   L2 2 2   L3 2 2   L4 2 2   L5 2 2   S1 2 2   S2 2 2       Motor Exam Lower Extremities    ? Myotome R L   Hip flexion L2 5 5   Knee extension L3 5 5   Ankle dorsiflexion L4 5 5   Hip abduction L5 5  5   Toe extension L5 5 5   Ankle plantarflexion S1 5 5         Reflexes  ?  R L   Patella  2+ 2+   Achilles   2+ 2+       MEDICAL DECISION MAKING    Medical records review: see under HPI section.     DATA    Labs:   Lab Results   Component Value Date/Time    SODIUM 136 05/03/2023 04:28 AM    SODIUM 137 10/13/2022 07:23 AM    POTASSIUM 4.6 05/03/2023 04:28 AM    POTASSIUM 4.1 10/13/2022 07:23 AM    CHLORIDE 101 05/03/2023 04:28 AM    CHLORIDE 101 10/13/2022 07:23 AM    CO2 23 05/03/2023 04:28 AM    CO2 27 10/13/2022 07:23 AM    ANION 9.0 10/13/2022 07:23 AM    GLUCOSE 114 (H) 05/03/2023 04:28 AM    GLUCOSE 104 (H) 10/13/2022 07:23 AM    BUN 21 05/03/2023 04:28 AM    BUN 17 10/13/2022 07:23 AM    CREATININE 0.54 (L) 05/03/2023 04:28 AM    CREATININE 0.45 (L) 10/13/2022 07:23 AM    CALCIUM 9.7 05/03/2023 04:28 AM    CALCIUM 9.5 10/13/2022 07:23 AM    ASTSGOT 31 05/03/2023 04:28 AM    ASTSGOT 25 10/13/2022 07:23 AM    ALTSGPT 42 (H) 05/03/2023 04:28 AM    ALTSGPT 30 10/13/2022 07:23 AM    TBILIRUBIN 0.5 05/03/2023 04:28 AM    TBILIRUBIN 0.5 10/13/2022 07:23 AM    ALBUMIN 3.8 05/03/2023 04:28 AM    ALBUMIN 4.2 10/13/2022 07:23 AM    TOTPROTEIN 6.7 05/03/2023 04:28 AM    TOTPROTEIN 7.2 10/13/2022 07:23 AM    GLOBULIN 2.9 05/03/2023 04:28 AM    GLOBULIN 3.0 10/13/2022 07:23 AM    AGRATIO 1.3 05/03/2023 04:28 AM    AGRATIO 1.4 10/13/2022 07:23 AM       No results found for: PROTHROMBTM, INR     Lab Results   Component Value Date/Time    WBC 6.6 05/12/2016 06:49 AM    RBC 5.17 05/12/2016 06:49 AM    HEMOGLOBIN 14.8 05/12/2016 06:49 AM    HEMATOCRIT 45.1 05/12/2016 06:49 AM    MCV 87.2 05/12/2016 06:49 AM    MCH 28.6 05/12/2016 06:49 AM    MCHC 32.8 (L) 05/12/2016 06:49 AM    MPV 11.0 05/12/2016 06:49 AM    NEUTSPOLYS 44.70 10/07/2015 07:29 AM    LYMPHOCYTES 45.70 (H) 10/07/2015 07:29 AM    MONOCYTES 7.20 10/07/2015 07:29 AM    EOSINOPHILS 1.60 10/07/2015 07:29 AM    BASOPHILS 0.60 10/07/2015 07:29 AM    HYPOCHROMIA 1+  02/05/2014 07:33 AM        Lab Results   Component Value Date/Time    HBA1C 6.7 (A) 05/10/2023 11:25 AM        Imaging: I personally reviewed following images, these are my reads  MRI lumbar spine 06/01/2023  At L1-2, no central or foraminal stenosis  At L2-3, broad-based disc bulge, mild foraminal stenosis, mlid lateral recess stenosis and facet arthropathy  At L3-4, broad-based disc bulge, mild central canal stenosis, facet arthropathy, mild foraminal stenosis bilaterally  At L4-5, disc bulge, facet arthropathy, mild central canal stenosis, mild left foraminal stenosis  At L5-S1, no central or foraminal stenosis, mild facet arthropathy    Xray hips 05/25/2023  Moderate hip osteoarthritis, calcifications near left greater trochanter    Xray lumbar spine 05/11/2023  There is degenerative disc disease at L2-3 and mild degenerative changes lower.  No alignment abnormalities.    IMAGING radiology reads. I reviewed the following radiology reads     Xray hip 05/25/2023  IMPRESSION:        Moderate osteoarthritis of the bilateral hip joints.     Amorphous calcification adjacent to the left greater trochanter could relate to calcific tendinitis.    MRI lumbar spine 06/01/2023  IMPRESSION:        Broad-based disc bulge at L2-3 with bilateral mild lateral recess narrowing and bilateral mild foraminal narrowing.     Broad-based disc bulge at L3-4 and L4-5 causes minimal canal narrowing.    Results for orders placed during the hospital encounter of 05/11/23    DX-LUMBAR SPINE-2 OR 3 VIEWS    Impression  No acute osseous abnormality.  No malalignment.  Moderate degenerative change of the lumbar spine, most at L2-3.                          Diagnosis   Visit Diagnoses     ICD-10-CM   1. Primary osteoarthritis of both hips  M16.0   2. Lumbar spondylosis  M47.816   3. DDD (degenerative disc disease), lumbar  M51.36   4. Spinal stenosis of lumbar region, unspecified whether neurogenic claudication present  M48.061              ASSESSMENT:  Rosario Blevins 62 y.o. female seen for above     Rosario was seen today for follow-up.    Diagnoses and all orders for this visit:    Primary osteoarthritis of both hips    Lumbar spondylosis    DDD (degenerative disc disease), lumbar    Spinal stenosis of lumbar region, unspecified whether neurogenic claudication present  Comments:  MIld L3-4        Discussed xray findings of hips and MRI lumbar spine.  Pain has improved significantly and findings on exam are improved.  Discontinue gabapentin.  Advised caution with use of NSAIDs.  Reduce to lowest possible dose.  Discussed trial of duloxetine.  She declines this.  This can be considered with her PCP in the future.  Reviewed exercise program for lumbar spine and hips.  Encouraged her to continue this.    Follow-up: Return if symptoms worsen or fail to improve.      Thank you very much for asking me to participate in Rosario Blevins's care.  Please contact me with any questions or concerns.    Please note that this dictation was created using voice recognition software. I have made every reasonable attempt to correct obvious errors but there may be errors of grammar and content that I may have overlooked prior to finalization of this note.      Edgar Maxwell MD  Physical Medicine and Rehabilitation  Interventional Spine and Sports Physiatry  [facility]           CC Yaya Frank P.A.

## 2023-07-24 ENCOUNTER — OFFICE VISIT (OUTPATIENT)
Dept: MEDICAL GROUP | Facility: MEDICAL CENTER | Age: 63
End: 2023-07-24
Payer: COMMERCIAL

## 2023-07-24 VITALS
SYSTOLIC BLOOD PRESSURE: 126 MMHG | OXYGEN SATURATION: 95 % | BODY MASS INDEX: 33.65 KG/M2 | HEIGHT: 60 IN | HEART RATE: 96 BPM | TEMPERATURE: 98.4 F | WEIGHT: 171.4 LBS | DIASTOLIC BLOOD PRESSURE: 60 MMHG

## 2023-07-24 DIAGNOSIS — M54.41 ACUTE RIGHT-SIDED LOW BACK PAIN WITH RIGHT-SIDED SCIATICA: ICD-10-CM

## 2023-07-24 DIAGNOSIS — Z79.4 TYPE 2 DIABETES MELLITUS WITH HYPERGLYCEMIA, WITH LONG-TERM CURRENT USE OF INSULIN (HCC): ICD-10-CM

## 2023-07-24 DIAGNOSIS — E11.65 TYPE 2 DIABETES MELLITUS WITH HYPERGLYCEMIA, WITH LONG-TERM CURRENT USE OF INSULIN (HCC): ICD-10-CM

## 2023-07-24 DIAGNOSIS — M79.605 LEFT LEG PAIN: ICD-10-CM

## 2023-07-24 PROBLEM — M54.31 SCIATICA OF RIGHT SIDE: Status: RESOLVED | Noted: 2023-04-19 | Resolved: 2023-07-24

## 2023-07-24 PROBLEM — M79.602 LEFT ARM PAIN: Status: RESOLVED | Noted: 2022-04-13 | Resolved: 2023-07-24

## 2023-07-24 PROCEDURE — 3074F SYST BP LT 130 MM HG: CPT | Performed by: PHYSICIAN ASSISTANT

## 2023-07-24 PROCEDURE — 3078F DIAST BP <80 MM HG: CPT | Performed by: PHYSICIAN ASSISTANT

## 2023-07-24 PROCEDURE — 99214 OFFICE O/P EST MOD 30 MIN: CPT | Performed by: PHYSICIAN ASSISTANT

## 2023-07-24 RX ORDER — METHYLPREDNISOLONE 4 MG/1
TABLET ORAL
Qty: 21 TABLET | Refills: 0 | Status: SHIPPED
Start: 2023-07-24 | End: 2023-10-13

## 2023-07-24 NOTE — ASSESSMENT & PLAN NOTE
This is a pleasant 62-year-old female complains of pain that started yesterday of her left leg.  In the past she had pain in a similar location.  Left leg on that side.  She states that the pain starts up at the thigh and goes down to the knee.  Does have a history of back pain with right leg sciatica.  She was seen twice by pain management.  MRI revealed a bulging disks with mild foraminal narrowing of L2-L3, L3-L4 and L4-L5.  Back pain though with the sciatica has resolved.

## 2023-07-24 NOTE — PROGRESS NOTES
Subjective:   Rosario Blevins is a 62 y.o. female here today for acute left leg pain and diabetes.    Left leg pain  This is a pleasant 62-year-old female complains of pain that started yesterday of her left leg.  In the past she had pain in a similar location.  Left leg on that side.  She states that the pain starts up at the thigh and goes down to the knee.  Does have a history of back pain with right leg sciatica.  She was seen twice by pain management.  MRI revealed a bulging disks with mild foraminal narrowing of L2-L3, L3-L4 and L4-L5.  Back pain though with the sciatica has resolved.    Type 2 diabetes mellitus with hyperglycemia, with long-term current use of insulin ([facility])  Unfortunately Tam does not have any 2 mg Ozempic pens.  She has been cutting back on her dose and taking 1 mg weekly.  She plans to go upstairs to her endocrinology office to try to get samples.       Current medicines (including changes today)  Current Outpatient Medications   Medication Sig Dispense Refill    methylPREDNISolone (MEDROL DOSEPAK) 4 MG Tablet Therapy Pack As directed on the packaging label. 21 Tablet 0    Semaglutide, 2 MG/DOSE, (OZEMPIC, 2 MG/DOSE,) 8 MG/3ML Solution Pen-injector Inject 2 mg under the skin every 7 days. 3 mL 5    TRESIBA FLEXTOUCH 200 UNIT/ML Solution Pen-injector Inject 50 Units as directed every day. 27 mL 3    rosuvastatin (CRESTOR) 40 MG tablet Take 1 Tablet by mouth every day. 90 Tablet 3    amLODIPine (NORVASC) 5 MG Tab Take 1 Tablet by mouth every day. 90 Tablet 3    lisinopril (PRINIVIL) 20 MG Tab Take 1 Tablet by mouth every day. 90 Tablet 2    omeprazole (PRILOSEC) 40 MG delayed-release capsule Take 1 Capsule by mouth every day. 90 Capsule 2    estradiol (ESTRACE) 1 MG Tab Take 1 Tablet by mouth every day. 90 Tablet 2    Multiple Vitamins-Minerals (CENTRUM SILVER PO) Take  by mouth every day.       No current facility-administered medications for this visit.     She  has a past medical  history of Diabetes, Hyperlipidemia, and Hypertension.    Social History and Family History were reviewed and updated.    ROS   No chest pain, no shortness of breath, no abdominal pain and all other systems were reviewed and are negative.       Objective:     /60 (BP Location: Left arm, Patient Position: Sitting, BP Cuff Size: Adult)   Pulse 96   Temp 36.9 °C (98.4 °F) (Temporal)   Ht 1.524 m (5')   Wt 77.7 kg (171 lb 6.4 oz)   SpO2 95%  Body mass index is 33.47 kg/m².   Physical Exam:  Constitutional: Alert, no distress.  Skin: Warm, dry, good turgor, no rashes in visible areas.  Eye: Equal, round and reactive, conjunctiva clear, lids normal.  ENMT: Lips without lesions, good dentition, oropharynx clear.  Neck: Trachea midline, no masses.   Respiratory: Unlabored respiratory effort.  Musculoskeletal: Letitia test is negative.  No point tenderness of the lateral femoral epicondyle.    Psych: Alert and oriented x3, normal affect and mood.        Assessment and Plan:   The following treatment plan was discussed    1. Left leg pain  Chronic condition with recent exacerbation.  Thought initially her symptoms were secondary to IT band syndrome but Zickel exam suggests otherwise.  This could be sciatica nonetheless given MRI findings.  Discussed continuing ibuprofen.  Stretching.  With time should improve.  Did provide her a note to return to work on Friday.  Also will provide her a short course of Medrol Dosepak to reduce inflammation from possible sciatica.  - methylPREDNISolone (MEDROL DOSEPAK) 4 MG Tablet Therapy Pack; As directed on the packaging label.  Dispense: 21 Tablet; Refill: 0    2. Acute right-sided low back pain with right-sided sciatica  Acute, new onset condition.  Resolved.  No need to follow-up any longer at pain management.  She states as well that Dr. Maxwell will be leaving the practice.      3. Type 2 diabetes mellitus with hyperglycemia, with long-term current use of insulin (HCC)  Chronic  condition.  Stable. She will go upstairs to check on samples from her endocrinology office.         Followup: Return if symptoms worsen or fail to improve.    Please note that this dictation was created using voice recognition software. I have made every reasonable attempt to correct obvious errors, but I expect that there are errors of grammar and possibly content that I did not discover before finalizing the note.

## 2023-07-24 NOTE — ASSESSMENT & PLAN NOTE
Unfortunately Tam does not have any 2 mg Ozempic pens.  She has been cutting back on her dose and taking 1 mg weekly.  She plans to go upstairs to her endocrinology office to try to get samples.

## 2023-07-24 NOTE — LETTER
July 24, 2023         Patient: Rosario Blevins   YOB: 1960   Date of Visit: 7/24/2023           To Whom it May Concern:    Rosario Blevins was seen in my clinic on 7/24/2023. She did not work yesterday on 7/23/2023. She may return to work on 7/28/2023.     If you have any questions or concerns, please don't hesitate to call.        Sincerely,           Yaya Frank P.A.-C.  Electronically Signed

## 2023-09-06 ENCOUNTER — HOSPITAL ENCOUNTER (OUTPATIENT)
Dept: RADIOLOGY | Facility: MEDICAL CENTER | Age: 63
End: 2023-09-06
Attending: PHYSICIAN ASSISTANT
Payer: COMMERCIAL

## 2023-09-06 DIAGNOSIS — Z12.31 ENCOUNTER FOR SCREENING MAMMOGRAM FOR MALIGNANT NEOPLASM OF BREAST: ICD-10-CM

## 2023-09-06 PROCEDURE — 77063 BREAST TOMOSYNTHESIS BI: CPT

## 2023-10-11 ENCOUNTER — HOSPITAL ENCOUNTER (OUTPATIENT)
Dept: LAB | Facility: MEDICAL CENTER | Age: 63
End: 2023-10-11
Attending: INTERNAL MEDICINE
Payer: COMMERCIAL

## 2023-10-11 DIAGNOSIS — E11.9 CONTROLLED TYPE 2 DIABETES MELLITUS WITHOUT COMPLICATION, WITH LONG-TERM CURRENT USE OF INSULIN (HCC): ICD-10-CM

## 2023-10-11 DIAGNOSIS — Z79.4 LONG-TERM INSULIN USE (HCC): ICD-10-CM

## 2023-10-11 DIAGNOSIS — Z79.4 CONTROLLED TYPE 2 DIABETES MELLITUS WITHOUT COMPLICATION, WITH LONG-TERM CURRENT USE OF INSULIN (HCC): ICD-10-CM

## 2023-10-11 DIAGNOSIS — E78.5 DYSLIPIDEMIA: ICD-10-CM

## 2023-10-11 DIAGNOSIS — E55.9 VITAMIN D DEFICIENCY: ICD-10-CM

## 2023-10-11 LAB
25(OH)D3 SERPL-MCNC: 31 NG/ML (ref 30–100)
ALBUMIN SERPL BCP-MCNC: 4.2 G/DL (ref 3.2–4.9)
ALBUMIN/GLOB SERPL: 1.3 G/DL
ALP SERPL-CCNC: 50 U/L (ref 30–99)
ALT SERPL-CCNC: 39 U/L (ref 2–50)
ANION GAP SERPL CALC-SCNC: 10 MMOL/L (ref 7–16)
AST SERPL-CCNC: 32 U/L (ref 12–45)
BILIRUB SERPL-MCNC: 0.6 MG/DL (ref 0.1–1.5)
BUN SERPL-MCNC: 14 MG/DL (ref 8–22)
CALCIUM ALBUM COR SERPL-MCNC: 9.7 MG/DL (ref 8.5–10.5)
CALCIUM SERPL-MCNC: 9.9 MG/DL (ref 8.5–10.5)
CHLORIDE SERPL-SCNC: 101 MMOL/L (ref 96–112)
CO2 SERPL-SCNC: 26 MMOL/L (ref 20–33)
CREAT SERPL-MCNC: 0.38 MG/DL (ref 0.5–1.4)
CREAT UR-MCNC: 66.45 MG/DL
GFR SERPLBLD CREATININE-BSD FMLA CKD-EPI: 113 ML/MIN/1.73 M 2
GLOBULIN SER CALC-MCNC: 3.3 G/DL (ref 1.9–3.5)
GLUCOSE SERPL-MCNC: 109 MG/DL (ref 65–99)
MICROALBUMIN UR-MCNC: <1.2 MG/DL
MICROALBUMIN/CREAT UR: NORMAL MG/G (ref 0–30)
POTASSIUM SERPL-SCNC: 4.4 MMOL/L (ref 3.6–5.5)
PROT SERPL-MCNC: 7.5 G/DL (ref 6–8.2)
SODIUM SERPL-SCNC: 137 MMOL/L (ref 135–145)

## 2023-10-11 PROCEDURE — 82570 ASSAY OF URINE CREATININE: CPT

## 2023-10-11 PROCEDURE — 36415 COLL VENOUS BLD VENIPUNCTURE: CPT

## 2023-10-11 PROCEDURE — 82306 VITAMIN D 25 HYDROXY: CPT

## 2023-10-11 PROCEDURE — 82043 UR ALBUMIN QUANTITATIVE: CPT

## 2023-10-11 PROCEDURE — 80053 COMPREHEN METABOLIC PANEL: CPT

## 2023-10-13 ENCOUNTER — OFFICE VISIT (OUTPATIENT)
Dept: MEDICAL GROUP | Facility: MEDICAL CENTER | Age: 63
End: 2023-10-13
Payer: COMMERCIAL

## 2023-10-13 VITALS
WEIGHT: 174.6 LBS | DIASTOLIC BLOOD PRESSURE: 70 MMHG | SYSTOLIC BLOOD PRESSURE: 120 MMHG | BODY MASS INDEX: 34.28 KG/M2 | TEMPERATURE: 97.3 F | HEIGHT: 60 IN | OXYGEN SATURATION: 96 % | HEART RATE: 84 BPM

## 2023-10-13 DIAGNOSIS — R79.89 LOW VITAMIN D LEVEL: ICD-10-CM

## 2023-10-13 DIAGNOSIS — Z53.20 COLON CANCER SCREENING DECLINED: ICD-10-CM

## 2023-10-13 DIAGNOSIS — Z23 NEED FOR IMMUNIZATION AGAINST INFLUENZA: ICD-10-CM

## 2023-10-13 DIAGNOSIS — Z23 NEED FOR STREPTOCOCCUS PNEUMONIAE VACCINATION: ICD-10-CM

## 2023-10-13 DIAGNOSIS — E11.65 TYPE 2 DIABETES MELLITUS WITH HYPERGLYCEMIA, WITH LONG-TERM CURRENT USE OF INSULIN (HCC): ICD-10-CM

## 2023-10-13 DIAGNOSIS — G89.29 CHRONIC RIGHT-SIDED LOW BACK PAIN WITH RIGHT-SIDED SCIATICA: ICD-10-CM

## 2023-10-13 DIAGNOSIS — Z79.4 TYPE 2 DIABETES MELLITUS WITH HYPERGLYCEMIA, WITH LONG-TERM CURRENT USE OF INSULIN (HCC): ICD-10-CM

## 2023-10-13 DIAGNOSIS — M54.41 CHRONIC RIGHT-SIDED LOW BACK PAIN WITH RIGHT-SIDED SCIATICA: ICD-10-CM

## 2023-10-13 PROBLEM — M79.602 LEFT ARM PAIN: Status: RESOLVED | Noted: 2022-04-13 | Resolved: 2023-10-13

## 2023-10-13 PROCEDURE — 90471 IMMUNIZATION ADMIN: CPT | Performed by: PHYSICIAN ASSISTANT

## 2023-10-13 PROCEDURE — 90686 IIV4 VACC NO PRSV 0.5 ML IM: CPT | Performed by: PHYSICIAN ASSISTANT

## 2023-10-13 PROCEDURE — 3078F DIAST BP <80 MM HG: CPT | Performed by: PHYSICIAN ASSISTANT

## 2023-10-13 PROCEDURE — 3074F SYST BP LT 130 MM HG: CPT | Performed by: PHYSICIAN ASSISTANT

## 2023-10-13 PROCEDURE — 99214 OFFICE O/P EST MOD 30 MIN: CPT | Mod: 25 | Performed by: PHYSICIAN ASSISTANT

## 2023-10-13 NOTE — ASSESSMENT & PLAN NOTE
Vitamin D level in May was in the mid 20s.  In the past she states that she took an over-the-counter vitamin D3 supplement but it caused her to have some GI side effects.

## 2023-10-13 NOTE — ASSESSMENT & PLAN NOTE
Appointment with Gage in early November.  Last glucose level fasting was 109.  She was hopeful it would be below 100.  She is taking Ozempic and Tresiba.  He will be following up with endocrinology for A1c.

## 2023-10-13 NOTE — ASSESSMENT & PLAN NOTE
This is a pleasant 62-year-old female here today to follow-up on her health.  Back pain is stable.  Still deals with right-sided sciatica.  Seen by physiatry.  No improvement.  Does take medications over-the-counter as needed.

## 2023-10-13 NOTE — PROGRESS NOTES
Subjective:   Rosario Blevins is a 62 y.o. female here today for right back pain with right-sided sciatica and diabetes.    Chronic right-sided low back pain with right-sided sciatica  This is a pleasant 62-year-old female here today to follow-up on her health.  Back pain is stable.  Still deals with right-sided sciatica.  Seen by physiatry.  No improvement.  Does take medications over-the-counter as needed.    Type 2 diabetes mellitus with hyperglycemia, with long-term current use of insulin ([facility])  Appointment with Gaeg in early November.  Last glucose level fasting was 109.  She was hopeful it would be below 100.  She is taking Ozempic and Tresiba.  He will be following up with endocrinology for A1c.    Low vitamin D level  Vitamin D level in May was in the mid 20s.  In the past she states that she took an over-the-counter vitamin D3 supplement but it caused her to have some GI side effects.       Current medicines (including changes today)  Current Outpatient Medications   Medication Sig Dispense Refill    Pneumococcal 20-Martina Conj Vacc (PREVNAR 20) 0.5 ML Suspension Prefilled Syringe syringe Inject 0.5 mL into the shoulder, thigh, or buttocks one time for 1 dose. 1 Each 0    Semaglutide, 2 MG/DOSE, (OZEMPIC, 2 MG/DOSE,) 8 MG/3ML Solution Pen-injector Inject 2 mg under the skin every 7 days. 3 mL 5    TRESIBA FLEXTOUCH 200 UNIT/ML Solution Pen-injector Inject 50 Units as directed every day. 27 mL 3    rosuvastatin (CRESTOR) 40 MG tablet Take 1 Tablet by mouth every day. 90 Tablet 3    amLODIPine (NORVASC) 5 MG Tab Take 1 Tablet by mouth every day. 90 Tablet 3    lisinopril (PRINIVIL) 20 MG Tab Take 1 Tablet by mouth every day. 90 Tablet 2    omeprazole (PRILOSEC) 40 MG delayed-release capsule Take 1 Capsule by mouth every day. 90 Capsule 2    estradiol (ESTRACE) 1 MG Tab Take 1 Tablet by mouth every day. 90 Tablet 2    Multiple Vitamins-Minerals (CENTRUM SILVER PO) Take  by mouth every day.       No  current facility-administered medications for this visit.     She  has a past medical history of Diabetes, Hyperlipidemia, and Hypertension.    Social History and Family History were reviewed and updated.    ROS   No chest pain, no shortness of breath, no abdominal pain and all other systems were reviewed and are negative.       Objective:     /70 (BP Location: Left arm, Patient Position: Sitting, BP Cuff Size: Adult)   Pulse 84   Temp 36.3 °C (97.3 °F) (Temporal)   Ht 1.524 m (5')   Wt 79.2 kg (174 lb 9.6 oz)   SpO2 96%  Body mass index is 34.1 kg/m².   Physical Exam:  Constitutional: Alert, no distress.  Skin: Warm, dry, good turgor, no rashes in visible areas.  Eye: Equal, round and reactive, conjunctiva clear, lids normal.  ENMT: Lips without lesions, good dentition, oropharynx clear.  Neck: Trachea midline, no masses.   Respiratory: Unlabored respiratory effort.  Psych: Alert and oriented x3, normal affect and mood.        Assessment and Plan:   The following treatment plan was discussed    1. Chronic right-sided low back pain with right-sided sciatica  Chronic condition.  Stable.  Continue OTC medications as needed.  Offered to refer her to another specialist but she declined.    2. Type 2 diabetes mellitus with hyperglycemia, with long-term current use of insulin (HCC)  Chronic condition.  Stable.  Likely well controlled.  Follow-up with endocrinology in November.  Discussed need for Prevnar 20 vaccination.  Reviewed medications list.  - Pneumococcal 20-Martina Conj Vacc (PREVNAR 20) 0.5 ML Suspension Prefilled Syringe syringe; Inject 0.5 mL into the shoulder, thigh, or buttocks one time for 1 dose.  Dispense: 1 Each; Refill: 0    3. Low vitamin D level  Chronic condition.  Discussed taking an over-the-counter vitamin D3 capsule.  It appears in the past she was taking a tablet.  Do not have the same side effect profile.    4. Need for Streptococcus pneumoniae vaccination  Discussed need for Prevnar 20.   Follow-up at [facility].  - Pneumococcal 20-Martina Conj Vacc (PREVNAR 20) 0.5 ML Suspension Prefilled Syringe syringe; Inject 0.5 mL into the shoulder, thigh, or buttocks one time for 1 dose.  Dispense: 1 Each; Refill: 0    5. Need for immunization against influenza  Administered without complaints.    - INFLUENZA VACCINE QUAD INJ (PF)    6. Colon cancer screening declined  Discussed need for colon cancer screening.  She declined at this time. She was contacted by her GI specialist.  Discussed Cologuard as well.  Declined.         Followup: Return in about 6 months (around 4/13/2024), or if symptoms worsen or fail to improve.    Please note that this dictation was created using voice recognition software. I have made every reasonable attempt to correct obvious errors, but I expect that there are errors of grammar and possibly content that I did not discover before finalizing the note.

## 2023-10-19 ENCOUNTER — APPOINTMENT (OUTPATIENT)
Dept: MEDICAL GROUP | Facility: MEDICAL CENTER | Age: 63
End: 2023-10-19
Payer: COMMERCIAL

## 2023-11-01 ENCOUNTER — PHARMACY VISIT (OUTPATIENT)
Dept: PHARMACY | Facility: MEDICAL CENTER | Age: 63
End: 2023-11-01
Payer: MEDICARE

## 2023-11-01 ENCOUNTER — OFFICE VISIT (OUTPATIENT)
Dept: ENDOCRINOLOGY | Facility: MEDICAL CENTER | Age: 63
End: 2023-11-01
Attending: INTERNAL MEDICINE
Payer: COMMERCIAL

## 2023-11-01 VITALS
SYSTOLIC BLOOD PRESSURE: 122 MMHG | RESPIRATION RATE: 20 BRPM | HEART RATE: 105 BPM | DIASTOLIC BLOOD PRESSURE: 76 MMHG | OXYGEN SATURATION: 96 % | BODY MASS INDEX: 33.86 KG/M2 | WEIGHT: 172.5 LBS | HEIGHT: 60 IN

## 2023-11-01 DIAGNOSIS — E11.9 CONTROLLED TYPE 2 DIABETES MELLITUS WITHOUT COMPLICATION, WITH LONG-TERM CURRENT USE OF INSULIN (HCC): ICD-10-CM

## 2023-11-01 DIAGNOSIS — I10 ESSENTIAL HYPERTENSION: ICD-10-CM

## 2023-11-01 DIAGNOSIS — Z79.4 CONTROLLED TYPE 2 DIABETES MELLITUS WITHOUT COMPLICATION, WITH LONG-TERM CURRENT USE OF INSULIN (HCC): ICD-10-CM

## 2023-11-01 DIAGNOSIS — E55.9 VITAMIN D DEFICIENCY: ICD-10-CM

## 2023-11-01 DIAGNOSIS — E11.65 TYPE 2 DIABETES MELLITUS WITH HYPERGLYCEMIA, WITH LONG-TERM CURRENT USE OF INSULIN (HCC): ICD-10-CM

## 2023-11-01 DIAGNOSIS — E78.5 DYSLIPIDEMIA: ICD-10-CM

## 2023-11-01 DIAGNOSIS — Z79.4 LONG-TERM INSULIN USE (HCC): ICD-10-CM

## 2023-11-01 DIAGNOSIS — Z79.4 TYPE 2 DIABETES MELLITUS WITH HYPERGLYCEMIA, WITH LONG-TERM CURRENT USE OF INSULIN (HCC): ICD-10-CM

## 2023-11-01 LAB
HBA1C MFR BLD: 6.2 % (ref ?–5.8)
POCT INT CON NEG: NEGATIVE
POCT INT CON POS: POSITIVE

## 2023-11-01 PROCEDURE — 83036 HEMOGLOBIN GLYCOSYLATED A1C: CPT | Performed by: INTERNAL MEDICINE

## 2023-11-01 PROCEDURE — 3078F DIAST BP <80 MM HG: CPT | Performed by: INTERNAL MEDICINE

## 2023-11-01 PROCEDURE — RXMED WILLOW AMBULATORY MEDICATION CHARGE: Performed by: INTERNAL MEDICINE

## 2023-11-01 PROCEDURE — 3074F SYST BP LT 130 MM HG: CPT | Performed by: INTERNAL MEDICINE

## 2023-11-01 PROCEDURE — 99214 OFFICE O/P EST MOD 30 MIN: CPT | Performed by: INTERNAL MEDICINE

## 2023-11-01 PROCEDURE — 99212 OFFICE O/P EST SF 10 MIN: CPT | Performed by: INTERNAL MEDICINE

## 2023-11-01 RX ORDER — GABAPENTIN 100 MG/1
100 CAPSULE ORAL 3 TIMES DAILY
Qty: 90 CAPSULE | Refills: 2 | OUTPATIENT
Start: 2023-05-10

## 2023-11-01 RX ORDER — INSULIN DEGLUDEC 200 U/ML
40 INJECTION, SOLUTION SUBCUTANEOUS
Qty: 27 ML | Refills: 3 | COMMUNITY
Start: 2023-11-01

## 2023-11-01 RX ORDER — RESPIRATORY SYNCYTIAL VISUS VACCINE RECOMBINANT, ADJUVANTED 120MCG/0.5
0.5 KIT INTRAMUSCULAR
Qty: 0.5 ML | Refills: 0 | OUTPATIENT
Start: 2023-11-01

## 2023-11-01 RX ORDER — SEMAGLUTIDE 0.68 MG/ML
1 INJECTION, SOLUTION SUBCUTANEOUS
Qty: 6 ML | Refills: 0 | Status: SHIPPED | OUTPATIENT
Start: 2023-11-01 | End: 2024-01-23 | Stop reason: SDUPTHER

## 2023-11-01 RX ORDER — SEMAGLUTIDE 2.68 MG/ML
2 INJECTION, SOLUTION SUBCUTANEOUS
Qty: 9 ML | Refills: 2 | Status: SHIPPED | OUTPATIENT
Start: 2023-11-01

## 2023-11-01 NOTE — PROGRESS NOTES
CHIEF COMPLAINT: Patient is here for follow up of Type 2 Diabetes Mellitus    HPI:     Rosario Blevins is a 62 y.o. female with Type 2 Diabetes Mellitus here for follow up.    Labs from 5/10/2023 show a1c was 7.1%  Labs from 11/2/2022 show a1c was 6.4%  Labs from 7/20/2022 show a1c was 7.3%  Labs from 4/6/2022 show a1c was 8.3%  Labs from October 6, 2021 show A1c was 6.8%  Labs from March 24, 2021 show A1c was 7%  Labs from 5/28/2020 show A1c was 7.2%      She has a history of intolerance of Metformin because of diarrhea and she had recurrent yeast infections with an SGLT2 inhibitor specifically Farxiga        She is taking   Tresiba 40u daily, and   Ozempic 2.0 mg once a week      She denies SE with her meds  She cannot get her meds due to nationwide shortage          She has no history of CAD  She has hyperlipidemia and is on generic Crestor 40mg daily  She denies symptoms of statin related myopathy  Her LDL-C was 117 on 5/2023  Her LDL cholesterol was 86 on 10/13/2022        She has essential hypertension is taking lisinopril  She is tolerating her medication fairly well  She does not have diabetic nephropathy at baseline  UACR was 31 on 5/2023  UACR was < 30 on 3/31/2022      She has no diabetic retinopathy   Eye exam was on 8/2022        BG Diary:  Patient forgot her meter    Weight has been stable    Diabetes Complications   Retinopathy: No known retinopathy.  Last eye exam: 8/2022 with Dr. Taylor  Neuropathy: Denies paresthesias or numbness in hands or feet. Denies any foot wounds.  Exercise: Minimal.  Diet: Fair.  Patient's medications, allergies, and social histories were reviewed and updated as appropriate.    ROS:     CONS:     No fever, no chills   EYES:     No diplopia, no blurry vision   CV:           No chest pain, no palpitations   PULM:     No SOB, no cough, no hemoptysis.   GI:            No nausea, no vomiting, no diarrhea, no constipation   ENDO:     No polyuria, no polydipsia, no heat  intolerance, no cold intolerance       Past Medical History:  Problem List:  2023-10: Colon cancer screening declined  2023-10: Low vitamin D level  2023: Chronic right-sided low back pain with right-sided sciatica  2023: Sciatica of right side  2022-10: Acute recurrent frontal sinusitis  2022: Left arm pain  2021: Acute pansinusitis  2021: Achilles tendon pain  2020: Long-term insulin use ([facility])  2019: Left leg pain  2019: Influenza vaccination given  2018-10: Lipoma of back  2018-10: History of shingles  2018: Obesity (BMI 30-39.9)  2018: GERD (gastroesophageal reflux disease)  2013: Controlled type 2 diabetes mellitus without complication,   with long-term current use of insulin ([facility])  2013: Essential hypertension  2013: Dyslipidemia      Past Surgical History:  Past Surgical History:   Procedure Laterality Date    CATARACT EXTRACTION WITH IOL Bilateral 2020    HYSTERECTOMY, TOTAL ABDOMINAL      US-NEEDLE CORE BX-BREAST PANEL          Allergies:  Milk products food allergy and Codeine     Social History:  Social History     Tobacco Use    Smoking status: Former     Current packs/day: 0.00     Types: Cigarettes     Quit date: 1991     Years since quittin.7    Smokeless tobacco: Never   Vaping Use    Vaping Use: Never used   Substance Use Topics    Alcohol use: No    Drug use: No        Family History:   family history includes Cancer in her mother; Heart Attack (age of onset: 38) in her maternal uncle; Heart Attack (age of onset: 40) in her maternal uncle; Heart Attack (age of onset: 50) in her maternal grandmother; No Known Problems in her father; Other in her brother and sister; Other (age of onset: 47) in her mother.      PHYSICAL EXAM:   OBJECTIVE:  Vital signs: /76 (BP Location: Left arm, Patient Position: Sitting, BP Cuff Size: Adult)   Pulse (!) 105   Resp 20   Ht 1.524 m (5')   Wt 78.2 kg (172 lb 8 oz)   SpO2 96%   BMI 33.69 kg/m²   GENERAL:  Well-developed, well-nourished in no apparent distress.   EYE:  No ocular asymmetry, PERRLA  HENT: Pink, moist mucous membranes.    NECK: No thyromegaly.   CARDIOVASCULAR:  No murmurs  LUNGS: Clear breath sounds  ABDOMEN: Soft, nontender   EXTREMITIES: No clubbing, cyanosis, or edema.   NEUROLOGICAL: No gross focal motor abnormalities   LYMPH: No cervical adenopathy palpated.   SKIN: No rashes, she has a palpable soft, movable, lipoma on her upper back near the midline    Labs:  Lab Results   Component Value Date/Time    HBA1C 6.7 (A) 05/10/2023 11:25 AM        Lab Results   Component Value Date/Time    WBC 6.6 05/12/2016 06:49 AM    RBC 5.17 05/12/2016 06:49 AM    HEMOGLOBIN 14.8 05/12/2016 06:49 AM    MCV 87.2 05/12/2016 06:49 AM    MCH 28.6 05/12/2016 06:49 AM    MCHC 32.8 (L) 05/12/2016 06:49 AM    RDW 41.7 05/12/2016 06:49 AM    MPV 11.0 05/12/2016 06:49 AM       Lab Results   Component Value Date/Time    SODIUM 137 10/11/2023 07:12 AM    POTASSIUM 4.4 10/11/2023 07:12 AM    CHLORIDE 101 10/11/2023 07:12 AM    CO2 26 10/11/2023 07:12 AM    ANION 10.0 10/11/2023 07:12 AM    GLUCOSE 109 (H) 10/11/2023 07:12 AM    BUN 14 10/11/2023 07:12 AM    CREATININE 0.38 (L) 10/11/2023 07:12 AM    CALCIUM 9.9 10/11/2023 07:12 AM    ASTSGOT 32 10/11/2023 07:12 AM    ALTSGPT 39 10/11/2023 07:12 AM    TBILIRUBIN 0.6 10/11/2023 07:12 AM    ALBUMIN 4.2 10/11/2023 07:12 AM    TOTPROTEIN 7.5 10/11/2023 07:12 AM    GLOBULIN 3.3 10/11/2023 07:12 AM    AGRATIO 1.3 10/11/2023 07:12 AM       Lab Results   Component Value Date/Time    CHOLSTRLTOT 218 (H) 05/28/2020 1001    TRIGLYCERIDE 276 (H) 05/28/2020 1001    HDL 42 05/28/2020 1001     (H) 05/28/2020 1001       Lab Results   Component Value Date/Time    MALBCRT see below 10/11/2023 07:13 AM    MICROALBUR <1.2 10/11/2023 07:13 AM    MICRALB 7.5 05/03/2023 04:28 AM        Lab Results   Component Value Date/Time    TSHULTRASEN 1.580 05/12/2016 0649     No results found for:  FREEDIR  No results found for: FREET3  No results found for: THYSTIMIG        ASSESSMENT/PLAN:     1. Type 2 diabetes mellitus with hyperglycemia, with long-term current use of insulin (HCC)  Improved control  A1c is excellent at 6.2%  Continue Tresiba 40u daily  Continue Ozempic 2mg weekly  Will change pharmacy to [facility]  Ordered samples of Ozempic x 2 to [facility]  She has to take vitamin D3 5000IU daily   Follow up in 6 mos  with labs     2. Dyslipidemia  Stable   LDL-C is at goal  Continue Crestor   Repeat fasting lipids in 6 months    3. Essential hypertension  Controlled  Repeat urine albumin in 6 mos    4. Long-term insulin use (HCC)  Patient is on long-term basal insulin therapy for type 2 diabetes      Return in about 6 months (around 5/1/2024).      Thank you kindly for allowing me to participate in the diabetes care plan for this patient.    Eris Almonte MD, FACE, NU      CC:   Yaya Frank P.A.-C.

## 2023-11-02 ENCOUNTER — TELEPHONE (OUTPATIENT)
Dept: ENDOCRINOLOGY | Facility: MEDICAL CENTER | Age: 63
End: 2023-11-02
Payer: COMMERCIAL

## 2023-11-02 NOTE — TELEPHONE ENCOUNTER
Received PA request via [employer] for Ozempic 2mg/dose 8mg/3ml Sol Pen-inj.    $40-3mls/28D  MAXIMUM DAYS SUPPLY OF 30    Insurance:[facility]    Pharmacy on File  [facility]     Is patient eligible to fill with [facility]? Yes    FA-Co-Pay Card eligible    Next Steps:  Sending to outreach

## 2023-11-13 DIAGNOSIS — K21.9 GASTROESOPHAGEAL REFLUX DISEASE WITHOUT ESOPHAGITIS: ICD-10-CM

## 2023-11-13 PROCEDURE — RXMED WILLOW AMBULATORY MEDICATION CHARGE: Performed by: PHYSICIAN ASSISTANT

## 2023-11-13 RX ORDER — OMEPRAZOLE 40 MG/1
40 CAPSULE, DELAYED RELEASE ORAL DAILY
Qty: 90 CAPSULE | Refills: 2 | Status: SHIPPED | OUTPATIENT
Start: 2023-11-13

## 2023-11-14 ENCOUNTER — PHARMACY VISIT (OUTPATIENT)
Dept: PHARMACY | Facility: MEDICAL CENTER | Age: 63
End: 2023-11-14
Payer: MEDICARE

## 2023-11-14 DIAGNOSIS — Z79.890 HORMONE REPLACEMENT THERAPY (HRT): ICD-10-CM

## 2023-11-14 PROCEDURE — RXMED WILLOW AMBULATORY MEDICATION CHARGE: Performed by: PHYSICIAN ASSISTANT

## 2023-11-14 RX ORDER — ESTRADIOL 1 MG/1
1 TABLET ORAL DAILY
Qty: 90 TABLET | Refills: 2 | Status: SHIPPED | OUTPATIENT
Start: 2023-11-14

## 2023-12-07 DIAGNOSIS — I10 ESSENTIAL HYPERTENSION: ICD-10-CM

## 2023-12-07 PROCEDURE — RXMED WILLOW AMBULATORY MEDICATION CHARGE: Performed by: PHYSICIAN ASSISTANT

## 2023-12-07 PROCEDURE — RXMED WILLOW AMBULATORY MEDICATION CHARGE: Performed by: INTERNAL MEDICINE

## 2023-12-08 ENCOUNTER — PHARMACY VISIT (OUTPATIENT)
Dept: PHARMACY | Facility: MEDICAL CENTER | Age: 63
End: 2023-12-08
Payer: MEDICARE

## 2023-12-08 PROCEDURE — RXMED WILLOW AMBULATORY MEDICATION CHARGE: Performed by: PHYSICIAN ASSISTANT

## 2023-12-08 RX ORDER — LISINOPRIL 20 MG/1
20 TABLET ORAL DAILY
Qty: 90 TABLET | Refills: 2 | Status: SHIPPED | OUTPATIENT
Start: 2023-12-08

## 2023-12-11 ENCOUNTER — PHARMACY VISIT (OUTPATIENT)
Dept: PHARMACY | Facility: MEDICAL CENTER | Age: 63
End: 2023-12-11

## 2024-01-15 ENCOUNTER — PHARMACY VISIT (OUTPATIENT)
Dept: PHARMACY | Facility: MEDICAL CENTER | Age: 64
End: 2024-01-15
Payer: COMMERCIAL

## 2024-01-15 PROCEDURE — RXMED WILLOW AMBULATORY MEDICATION CHARGE: Performed by: PHYSICIAN ASSISTANT

## 2024-01-15 PROCEDURE — RXMED WILLOW AMBULATORY MEDICATION CHARGE: Performed by: INTERNAL MEDICINE

## 2024-01-18 ENCOUNTER — TELEPHONE (OUTPATIENT)
Dept: ENDOCRINOLOGY | Facility: MEDICAL CENTER | Age: 64
End: 2024-01-18
Payer: COMMERCIAL

## 2024-01-18 NOTE — TELEPHONE ENCOUNTER
Patient came in today, and her insurance is not covering Ozempic. She would like to switch over to something that is cover by her insurance and she can't afford it.

## 2024-01-19 ENCOUNTER — TELEPHONE (OUTPATIENT)
Dept: ENDOCRINOLOGY | Facility: MEDICAL CENTER | Age: 64
End: 2024-01-19
Payer: COMMERCIAL

## 2024-01-19 PROCEDURE — RXMED WILLOW AMBULATORY MEDICATION CHARGE: Performed by: INTERNAL MEDICINE

## 2024-01-19 NOTE — TELEPHONE ENCOUNTER
Received Refill PA request via  [employer]   for SEMAGLUTIDE, 2 MG/DOSE, (OZEMPIC, 2 MG/DOSE,) 8 MG/3ML SOLUTION PEN-INJECTOR. (Quantity:3, Day Supply:28)     Insurance: [employer]  Member ID:  [id number]  BIN: [id number]  PCN: [id number]  Group: [id number]     Ran Test claim via [place] [facility] medication  No pa is required. Rejecting for cost excceds. Patient must give permission. [address] tried calling patient, however didn't want to verify HIPAA. Patient can call  [phone]

## 2024-01-22 ENCOUNTER — PHARMACY VISIT (OUTPATIENT)
Dept: PHARMACY | Facility: MEDICAL CENTER | Age: 64
End: 2024-01-22
Payer: COMMERCIAL

## 2024-01-23 DIAGNOSIS — Z79.4 CONTROLLED TYPE 2 DIABETES MELLITUS WITHOUT COMPLICATION, WITH LONG-TERM CURRENT USE OF INSULIN (HCC): ICD-10-CM

## 2024-01-23 DIAGNOSIS — E11.9 CONTROLLED TYPE 2 DIABETES MELLITUS WITHOUT COMPLICATION, WITH LONG-TERM CURRENT USE OF INSULIN (HCC): ICD-10-CM

## 2024-01-23 RX ORDER — SEMAGLUTIDE 0.68 MG/ML
1 INJECTION, SOLUTION SUBCUTANEOUS
Qty: 6 ML | Refills: 0 | Status: SHIPPED | OUTPATIENT
Start: 2024-01-23

## 2024-02-07 PROCEDURE — RXMED WILLOW AMBULATORY MEDICATION CHARGE: Performed by: INTERNAL MEDICINE

## 2024-02-07 PROCEDURE — RXMED WILLOW AMBULATORY MEDICATION CHARGE: Performed by: PHYSICIAN ASSISTANT

## 2024-02-08 ENCOUNTER — PHARMACY VISIT (OUTPATIENT)
Dept: PHARMACY | Facility: MEDICAL CENTER | Age: 64
End: 2024-02-08
Payer: COMMERCIAL

## 2024-02-12 DIAGNOSIS — Z79.4 CONTROLLED TYPE 2 DIABETES MELLITUS WITHOUT COMPLICATION, WITH LONG-TERM CURRENT USE OF INSULIN (HCC): ICD-10-CM

## 2024-02-12 DIAGNOSIS — E11.9 CONTROLLED TYPE 2 DIABETES MELLITUS WITHOUT COMPLICATION, WITH LONG-TERM CURRENT USE OF INSULIN (HCC): ICD-10-CM

## 2024-02-12 PROCEDURE — RXMED WILLOW AMBULATORY MEDICATION CHARGE: Performed by: INTERNAL MEDICINE

## 2024-02-12 RX ORDER — SEMAGLUTIDE 2.68 MG/ML
INJECTION, SOLUTION SUBCUTANEOUS
Qty: 3 ML | Refills: 5 | Status: SHIPPED | OUTPATIENT
Start: 2024-02-12

## 2024-02-13 ENCOUNTER — PHARMACY VISIT (OUTPATIENT)
Dept: PHARMACY | Facility: MEDICAL CENTER | Age: 64
End: 2024-02-13
Payer: COMMERCIAL

## 2024-03-08 PROCEDURE — RXMED WILLOW AMBULATORY MEDICATION CHARGE: Performed by: PHYSICIAN ASSISTANT

## 2024-03-11 ENCOUNTER — PHARMACY VISIT (OUTPATIENT)
Dept: PHARMACY | Facility: MEDICAL CENTER | Age: 64
End: 2024-03-11
Payer: COMMERCIAL

## 2024-03-13 ENCOUNTER — PHARMACY VISIT (OUTPATIENT)
Dept: PHARMACY | Facility: MEDICAL CENTER | Age: 64
End: 2024-03-13
Payer: COMMERCIAL

## 2024-03-13 PROCEDURE — RXMED WILLOW AMBULATORY MEDICATION CHARGE: Performed by: INTERNAL MEDICINE

## 2024-03-15 PROCEDURE — RXMED WILLOW AMBULATORY MEDICATION CHARGE: Performed by: INTERNAL MEDICINE

## 2024-03-19 ENCOUNTER — PHARMACY VISIT (OUTPATIENT)
Dept: PHARMACY | Facility: MEDICAL CENTER | Age: 64
End: 2024-03-19
Payer: COMMERCIAL

## 2024-03-27 ENCOUNTER — HOSPITAL ENCOUNTER (OUTPATIENT)
Dept: LAB | Facility: MEDICAL CENTER | Age: 64
End: 2024-03-27
Attending: INTERNAL MEDICINE
Payer: COMMERCIAL

## 2024-03-27 DIAGNOSIS — Z79.4 LONG-TERM INSULIN USE (HCC): ICD-10-CM

## 2024-03-27 DIAGNOSIS — E78.5 DYSLIPIDEMIA: ICD-10-CM

## 2024-03-27 DIAGNOSIS — E11.9 CONTROLLED TYPE 2 DIABETES MELLITUS WITHOUT COMPLICATION, WITH LONG-TERM CURRENT USE OF INSULIN (HCC): ICD-10-CM

## 2024-03-27 DIAGNOSIS — I10 ESSENTIAL HYPERTENSION: ICD-10-CM

## 2024-03-27 DIAGNOSIS — Z79.4 CONTROLLED TYPE 2 DIABETES MELLITUS WITHOUT COMPLICATION, WITH LONG-TERM CURRENT USE OF INSULIN (HCC): ICD-10-CM

## 2024-03-27 DIAGNOSIS — E55.9 VITAMIN D DEFICIENCY: ICD-10-CM

## 2024-03-27 LAB
25(OH)D3 SERPL-MCNC: 26 NG/ML (ref 30–100)
ALBUMIN SERPL BCP-MCNC: 4.2 G/DL (ref 3.2–4.9)
ALBUMIN/GLOB SERPL: 1.4 G/DL
ALP SERPL-CCNC: 47 U/L (ref 30–99)
ALT SERPL-CCNC: 39 U/L (ref 2–50)
ANION GAP SERPL CALC-SCNC: 11 MMOL/L (ref 7–16)
AST SERPL-CCNC: 32 U/L (ref 12–45)
BILIRUB SERPL-MCNC: 0.5 MG/DL (ref 0.1–1.5)
BUN SERPL-MCNC: 16 MG/DL (ref 8–22)
CALCIUM ALBUM COR SERPL-MCNC: 9.1 MG/DL (ref 8.5–10.5)
CALCIUM SERPL-MCNC: 9.3 MG/DL (ref 8.5–10.5)
CHLORIDE SERPL-SCNC: 100 MMOL/L (ref 96–112)
CHOLEST SERPL-MCNC: 158 MG/DL (ref 100–199)
CO2 SERPL-SCNC: 24 MMOL/L (ref 20–33)
CREAT SERPL-MCNC: 0.48 MG/DL (ref 0.5–1.4)
CREAT UR-MCNC: 49.88 MG/DL
FASTING STATUS PATIENT QL REPORTED: NORMAL
GFR SERPLBLD CREATININE-BSD FMLA CKD-EPI: 106 ML/MIN/1.73 M 2
GLOBULIN SER CALC-MCNC: 3 G/DL (ref 1.9–3.5)
GLUCOSE SERPL-MCNC: 161 MG/DL (ref 65–99)
HDLC SERPL-MCNC: 38 MG/DL
LDLC SERPL CALC-MCNC: 90 MG/DL
MICROALBUMIN UR-MCNC: <1.2 MG/DL
MICROALBUMIN/CREAT UR: NORMAL MG/G (ref 0–30)
POTASSIUM SERPL-SCNC: 4.4 MMOL/L (ref 3.6–5.5)
PROT SERPL-MCNC: 7.2 G/DL (ref 6–8.2)
SODIUM SERPL-SCNC: 135 MMOL/L (ref 135–145)
TRIGL SERPL-MCNC: 152 MG/DL (ref 0–149)

## 2024-03-27 PROCEDURE — 82306 VITAMIN D 25 HYDROXY: CPT

## 2024-03-27 PROCEDURE — 80061 LIPID PANEL: CPT

## 2024-03-27 PROCEDURE — 82570 ASSAY OF URINE CREATININE: CPT

## 2024-03-27 PROCEDURE — 80053 COMPREHEN METABOLIC PANEL: CPT

## 2024-03-27 PROCEDURE — 82043 UR ALBUMIN QUANTITATIVE: CPT

## 2024-03-27 PROCEDURE — 36415 COLL VENOUS BLD VENIPUNCTURE: CPT

## 2024-04-03 ENCOUNTER — OFFICE VISIT (OUTPATIENT)
Dept: ENDOCRINOLOGY | Facility: MEDICAL CENTER | Age: 64
End: 2024-04-03
Attending: INTERNAL MEDICINE
Payer: COMMERCIAL

## 2024-04-03 VITALS
RESPIRATION RATE: 16 BRPM | HEART RATE: 101 BPM | WEIGHT: 179.4 LBS | HEIGHT: 60 IN | DIASTOLIC BLOOD PRESSURE: 72 MMHG | BODY MASS INDEX: 35.22 KG/M2 | OXYGEN SATURATION: 97 % | SYSTOLIC BLOOD PRESSURE: 128 MMHG

## 2024-04-03 DIAGNOSIS — Z79.4 CONTROLLED TYPE 2 DIABETES MELLITUS WITHOUT COMPLICATION, WITH LONG-TERM CURRENT USE OF INSULIN (HCC): ICD-10-CM

## 2024-04-03 DIAGNOSIS — Z79.4 LONG-TERM INSULIN USE (HCC): ICD-10-CM

## 2024-04-03 DIAGNOSIS — E78.5 DYSLIPIDEMIA: ICD-10-CM

## 2024-04-03 DIAGNOSIS — E11.9 CONTROLLED TYPE 2 DIABETES MELLITUS WITHOUT COMPLICATION, WITH LONG-TERM CURRENT USE OF INSULIN (HCC): ICD-10-CM

## 2024-04-03 DIAGNOSIS — I10 ESSENTIAL HYPERTENSION: ICD-10-CM

## 2024-04-03 DIAGNOSIS — E55.9 VITAMIN D DEFICIENCY: ICD-10-CM

## 2024-04-03 LAB
HBA1C MFR BLD: 7.3 % (ref ?–5.8)
POCT INT CON NEG: NEGATIVE
POCT INT CON POS: POSITIVE

## 2024-04-03 PROCEDURE — 3074F SYST BP LT 130 MM HG: CPT | Performed by: INTERNAL MEDICINE

## 2024-04-03 PROCEDURE — 83036 HEMOGLOBIN GLYCOSYLATED A1C: CPT | Performed by: INTERNAL MEDICINE

## 2024-04-03 PROCEDURE — 3078F DIAST BP <80 MM HG: CPT | Performed by: INTERNAL MEDICINE

## 2024-04-03 PROCEDURE — 99214 OFFICE O/P EST MOD 30 MIN: CPT | Performed by: INTERNAL MEDICINE

## 2024-04-03 PROCEDURE — 99212 OFFICE O/P EST SF 10 MIN: CPT | Performed by: INTERNAL MEDICINE

## 2024-04-03 NOTE — PROGRESS NOTES
CHIEF COMPLAINT: Patient is here for follow up of Type 2 Diabetes Mellitus    HPI:     Rosario Blevins is a 63 y.o. female with Type 2 Diabetes Mellitus here for follow up.    Labs from 4/2/2024 show A1c is 7.3%  Labs from 11/1/2023 show A1c was 6.2%  Labs from 5/10/2023 show a1c was 7.1%  Labs from 11/2/2022 show a1c was 6.4%  Labs from 7/20/2022 show a1c was 7.3%  Labs from 4/6/2022 show a1c was 8.3%  Labs from October 6, 2021 show A1c was 6.8%  Labs from March 24, 2021 show A1c was 7%  Labs from 5/28/2020 show A1c was 7.2%      She has a history of intolerance of Metformin because of diarrhea   She had recurrent yeast infections with an SGLT2 inhibitor specifically Farxiga  She has a history of low back pain with radiculopathy      She is taking   Tresiba 40u daily, and   Ozempic 2.0 mg once a week      She denies SE with her meds  She admits that she doesn't check her sugars regularly  Her FBG was 161 on recent labs   She claims that she doesn't eat a lot   Breakfast is toast  Soup for lunch   Chicken for dinner with salad  She reports low back pain and sciatica improved after taking a supplement she bought online but she doesn't recall the details        She has hyperlipidemia and is on generic Crestor 40mg daily  She has no history of CAD  She denies symptoms of statin related myopathy   Latest Reference Range & Units 03/27/24 06:37   Cholesterol,Tot 100 - 199 mg/dL 158   Triglycerides 0 - 149 mg/dL 152 (H)   HDL >=40 mg/dL 38 !   LDL <100 mg/dL 90           She has essential hypertension is taking Lisinopril 20mg daily and Amlodipine 5mg daily  She is tolerating her medication fairly well  She does not have diabetic nephropathy at baseline   Latest Reference Range & Units 03/27/24 06:36   Micro Alb Creat Ratio 0 - 30 mg/g see below   Creatinine, Urine mg/dL 49.88   Microalbumin, Urine Random mg/dL <1.2         She has no diabetic retinopathy   Eye exam was on 9/7/2023      Incidentally her 25  hydroxyvitamin D was low at 27 on 3/2024        BG Diary:  Patient forgot her meter    Weight has been stable    Diabetes Complications   Retinopathy: No known retinopathy.  Last eye exam: 9/7/2023 with Dr. Taylor  Neuropathy: Denies paresthesias or numbness in hands or feet. Denies any foot wounds.  Exercise: Minimal.  Diet: Fair.  Patient's medications, allergies, and social histories were reviewed and updated as appropriate.    ROS:     CONS:     No fever, no chills   EYES:     No diplopia, no blurry vision   CV:           No chest pain, no palpitations   PULM:     No SOB, no cough, no hemoptysis.   GI:            No nausea, no vomiting, no diarrhea, no constipation   ENDO:     No polyuria, no polydipsia, no heat intolerance, no cold intolerance       Past Medical History:  Problem List:  2023-10: Colon cancer screening declined  2023-10: Low vitamin D level  2023-05: Chronic right-sided low back pain with right-sided sciatica  2023-04: Sciatica of right side  2022-10: Acute recurrent frontal sinusitis  2022-04: Left arm pain  2021-07: Acute pansinusitis  2021-03: Achilles tendon pain  2020-06: Long-term insulin use (HCC)  2019-04: Left leg pain  2019-01: Influenza vaccination given  2018-10: Lipoma of back  2018-10: History of shingles  2018-02: Obesity (BMI 30-39.9)  2018-02: GERD (gastroesophageal reflux disease)  2013-09: Controlled type 2 diabetes mellitus without complication,   with long-term current use of insulin ([facility])  2013-09: Essential hypertension  2013-09: Dyslipidemia      Past Surgical History:  Past Surgical History:   Procedure Laterality Date    CATARACT EXTRACTION WITH IOL Bilateral 2020    HYSTERECTOMY, TOTAL ABDOMINAL      US-NEEDLE CORE BX-BREAST PANEL          Allergies:  Milk products food allergy and Codeine     Social History:  Social History     Tobacco Use    Smoking status: Former     Current packs/day: 0.00     Types: Cigarettes     Quit date: 2/13/1991     Years since quitting:  33.1    Smokeless tobacco: Never   Vaping Use    Vaping Use: Never used   Substance Use Topics    Alcohol use: No    Drug use: No        Family History:   family history includes Cancer in her mother; Heart Attack (age of onset: 38) in her maternal uncle; Heart Attack (age of onset: 40) in her maternal uncle; Heart Attack (age of onset: 50) in her maternal grandmother; No Known Problems in her father; Other in her brother and sister; Other (age of onset: 47) in her mother.      PHYSICAL EXAM:   OBJECTIVE:  Vital signs: /72 (BP Location: Right arm, Patient Position: Sitting)   Pulse (!) 101   Resp 16   Ht 1.524 m (5')   Wt 81.4 kg (179 lb 6.4 oz)   SpO2 97%   BMI 35.04 kg/m²   GENERAL: Well-developed, well-nourished in no apparent distress.   EYE:  No ocular asymmetry, PERRLA  HENT: Pink, moist mucous membranes.    NECK: No thyromegaly.   CARDIOVASCULAR:  No murmurs  LUNGS: Clear breath sounds  ABDOMEN: Soft, nontender   EXTREMITIES: No clubbing, cyanosis, or edema.   NEUROLOGICAL: No gross focal motor abnormalities   LYMPH: No cervical adenopathy palpated.   SKIN: No rashes, she has a palpable soft, movable, lipoma on her upper back near the midline    Labs:  Lab Results   Component Value Date/Time    HBA1C 7.3 (A) 04/03/2024 07:26 AM        Lab Results   Component Value Date/Time    WBC 6.6 05/12/2016 06:49 AM    RBC 5.17 05/12/2016 06:49 AM    HEMOGLOBIN 14.8 05/12/2016 06:49 AM    MCV 87.2 05/12/2016 06:49 AM    MCH 28.6 05/12/2016 06:49 AM    MCHC 32.8 (L) 05/12/2016 06:49 AM    RDW 41.7 05/12/2016 06:49 AM    MPV 11.0 05/12/2016 06:49 AM       Lab Results   Component Value Date/Time    SODIUM 135 03/27/2024 06:37 AM    POTASSIUM 4.4 03/27/2024 06:37 AM    CHLORIDE 100 03/27/2024 06:37 AM    CO2 24 03/27/2024 06:37 AM    ANION 11.0 03/27/2024 06:37 AM    GLUCOSE 161 (H) 03/27/2024 06:37 AM    BUN 16 03/27/2024 06:37 AM    CREATININE 0.48 (L) 03/27/2024 06:37 AM    CALCIUM 9.3 03/27/2024 06:37 AM     ASTSGOT 32 03/27/2024 06:37 AM    ALTSGPT 39 03/27/2024 06:37 AM    TBILIRUBIN 0.5 03/27/2024 06:37 AM    ALBUMIN 4.2 03/27/2024 06:37 AM    TOTPROTEIN 7.2 03/27/2024 06:37 AM    GLOBULIN 3.0 03/27/2024 06:37 AM    AGRATIO 1.4 03/27/2024 06:37 AM       Lab Results   Component Value Date/Time    CHOLSTRLTOT 218 (H) 05/28/2020 1001    TRIGLYCERIDE 276 (H) 05/28/2020 1001    HDL 42 05/28/2020 1001     (H) 05/28/2020 1001       Lab Results   Component Value Date/Time    MALBCRT see below 03/27/2024 06:36 AM    MICROALBUR <1.2 03/27/2024 06:36 AM    MICRALB 7.5 05/03/2023 04:28 AM        Lab Results   Component Value Date/Time    TSHULTRASEN 1.580 05/12/2016 0649     No results found for: FREEDIR  No results found for: FREET3  No results found for: THYSTIMIG        ASSESSMENT/PLAN:     1. Type 2 diabetes mellitus with hyperglycemia, with long-term current use of insulin (HCC)  Fair control  A1c is slightly higher at 7.3%  Increase Tresiba 44u daily  Continue Ozempic 2mg weekly  Encouraged patient to test BG  She has to take vitamin D3 5000IU daily   Follow up in 6 mos  with labs     2. Dyslipidemia  Stable   LDL-C is at goal  Continue Crestor   Repeat fasting lipids in 12 months    3. Essential hypertension  Controlled  Repeat urine albumin in 12 mos    4. Vitamin D deficiency  Uncontrolled  Start vitamin D3 2000 IU daily    5. Long-term insulin use (HCC)  Patient is on long-term basal insulin therapy for type 2 diabetes      Return in about 6 months (around 10/3/2024).      Thank you kindly for allowing me to participate in the diabetes care plan for this patient.    Eris Almonte MD, FACE, ECNU      CC:   Yaya Frank P.A.-C.

## 2024-04-08 PROCEDURE — RXMED WILLOW AMBULATORY MEDICATION CHARGE: Performed by: PHYSICIAN ASSISTANT

## 2024-04-10 ENCOUNTER — APPOINTMENT (OUTPATIENT)
Dept: MEDICAL GROUP | Facility: MEDICAL CENTER | Age: 64
End: 2024-04-10
Payer: COMMERCIAL

## 2024-04-10 ENCOUNTER — PHARMACY VISIT (OUTPATIENT)
Dept: PHARMACY | Facility: MEDICAL CENTER | Age: 64
End: 2024-04-10
Payer: COMMERCIAL

## 2024-04-10 VITALS
HEIGHT: 60 IN | WEIGHT: 178 LBS | BODY MASS INDEX: 34.95 KG/M2 | TEMPERATURE: 97 F | SYSTOLIC BLOOD PRESSURE: 126 MMHG | OXYGEN SATURATION: 97 % | HEART RATE: 79 BPM | RESPIRATION RATE: 16 BRPM | DIASTOLIC BLOOD PRESSURE: 70 MMHG

## 2024-04-10 DIAGNOSIS — M54.41 CHRONIC RIGHT-SIDED LOW BACK PAIN WITH RIGHT-SIDED SCIATICA: ICD-10-CM

## 2024-04-10 DIAGNOSIS — E11.9 CONTROLLED TYPE 2 DIABETES MELLITUS WITHOUT COMPLICATION, WITH LONG-TERM CURRENT USE OF INSULIN (HCC): ICD-10-CM

## 2024-04-10 DIAGNOSIS — G89.29 CHRONIC RIGHT-SIDED LOW BACK PAIN WITH RIGHT-SIDED SCIATICA: ICD-10-CM

## 2024-04-10 DIAGNOSIS — Z79.4 CONTROLLED TYPE 2 DIABETES MELLITUS WITHOUT COMPLICATION, WITH LONG-TERM CURRENT USE OF INSULIN (HCC): ICD-10-CM

## 2024-04-10 PROCEDURE — 3074F SYST BP LT 130 MM HG: CPT | Performed by: PHYSICIAN ASSISTANT

## 2024-04-10 PROCEDURE — 99214 OFFICE O/P EST MOD 30 MIN: CPT | Performed by: PHYSICIAN ASSISTANT

## 2024-04-10 PROCEDURE — 3078F DIAST BP <80 MM HG: CPT | Performed by: PHYSICIAN ASSISTANT

## 2024-04-10 PROCEDURE — RXMED WILLOW AMBULATORY MEDICATION CHARGE: Performed by: INTERNAL MEDICINE

## 2024-04-10 ASSESSMENT — PATIENT HEALTH QUESTIONNAIRE - PHQ9: CLINICAL INTERPRETATION OF PHQ2 SCORE: 0

## 2024-04-10 NOTE — ASSESSMENT & PLAN NOTE
Still dealing with back pain with right leg sciatica.  Both of her legs have pain from time to time.  In the past I referred her to see Dr. Maxwell.  She has been on medication such as gabapentin.  She states she does her stretching exercises on a regular basis.

## 2024-04-10 NOTE — ASSESSMENT & PLAN NOTE
This is a pleasant 63-year-old female here today to follow-up on her health.  Recent A1c jumped to 7.3.  It was at 6.2.  She is on Ozempic as well as insulin.  She was disappointed A1c increased.  She is unsure why she is not able to lose weight on Ozempic.  She does not eat a lot.  She is active during the day and at work.  She tells me that her endocrinologist deferred lab ordering to me.  Her cholesterol level is well-controlled.  Blood pressure is stable.

## 2024-04-10 NOTE — PROGRESS NOTES
Subjective:   Rosario Blevins is a 63 y.o. female here today for diabetes and sciatica.    Controlled type 2 diabetes mellitus without complication, with long-term current use of insulin ([facility])  This is a pleasant 63-year-old female here today to follow-up on her health.  Recent A1c jumped to 7.3.  It was at 6.2.  She is on Ozempic as well as insulin.  She was disappointed A1c increased.  She is unsure why she is not able to lose weight on Ozempic.  She does not eat a lot.  She is active during the day and at work.  She tells me that her endocrinologist deferred lab ordering to me.  Her cholesterol level is well-controlled.  Blood pressure is stable.    Chronic right-sided low back pain with right-sided sciatica  Still dealing with back pain with right leg sciatica.  Both of her legs have pain from time to time.  In the past I referred her to see Dr. Maxwell.  She has been on medication such as gabapentin.  She states she does her stretching exercises on a regular basis.       Current medicines (including changes today)  Current Outpatient Medications   Medication Sig Dispense Refill    Semaglutide, 2 MG/DOSE, (OZEMPIC, 2 MG/DOSE,) 8 MG/3ML Solution Pen-injector Inject 2 MG under the skin every 7 days 3 mL 5    lisinopril (PRINIVIL) 20 MG Tab Take 1 Tablet by mouth every day. 90 Tablet 2    estradiol (ESTRACE) 1 MG Tab Take 1 Tablet by mouth every day. 90 Tablet 2    omeprazole (PRILOSEC) 40 MG delayed-release capsule Take 1 Capsule by mouth every day. 90 Capsule 2    Insulin Degludec (TRESIBA FLEXTOUCH) 200 UNIT/ML Solution Pen-injector Inject 40 Units under the skin at bedtime. 90 day 27 mL 3    rosuvastatin (CRESTOR) 40 MG tablet Take 1 Tablet by mouth every day. 90 Tablet 3    amLODIPine (NORVASC) 5 MG Tab Take 1 Tablet by mouth every day. 90 Tablet 3    Multiple Vitamins-Minerals (CENTRUM SILVER PO) Take  by mouth every day.       No current facility-administered medications for this visit.     She  has a past  medical history of Diabetes, Hyperlipidemia, and Hypertension.    Social History and Family History were reviewed and updated.    ROS   No chest pain, no shortness of breath, no abdominal pain and all other systems were reviewed and are negative.       Objective:     /70 (BP Location: Right arm, Patient Position: Sitting, BP Cuff Size: Adult)   Pulse 79   Temp 36.1 °C (97 °F) (Temporal)   Resp 16   Ht 1.524 m (5')   Wt 80.7 kg (178 lb)   SpO2 97%  Body mass index is 34.76 kg/m².   Physical Exam:  Constitutional: Alert, no distress.  Skin: Warm, dry, good turgor, no rashes in visible areas.  Eye: Equal, round and reactive, conjunctiva clear, lids normal.  ENMT: Lips without lesions, good dentition, oropharynx clear.  Neck: Trachea midline, no masses.   Psych: Alert and oriented x3, normal affect and mood.        Assessment and Plan:   The following treatment plan was discussed    1. Controlled type 2 diabetes mellitus without complication, with long-term current use of insulin (HCC)  Chronic condition.  Stable.  Ordered labs to be done prior to her appointment with endocrinology in October.  Fast 8 hours.  Continue medications as directed.  May be helpful to switch from Ozempic to Mounjaro for better probability of weight loss which may also improve on her low back pain.  - Lipid Profile; Future  - HEMOGLOBIN A1C; Future  - CBC WITH DIFFERENTIAL; Future  - Comp Metabolic Panel; Future  - TSH WITH REFLEX TO FT4; Future    2. Chronic right-sided low back pain with right-sided sciatica  Chronic condition.  Uncontrolled.  Discussed referring her back to pain management at [facility] to see Dr. Briggs.  Discussed referring her to PT.  She declined referrals.  Will continue to monitor her symptoms.  She is hoping during the summertime her symptoms will improve.  Will continue to monitor.         Followup: Return in about 6 months (around 10/10/2024), or if symptoms worsen or fail to improve.    Please note that this  dictation was created using voice recognition software. I have made every reasonable attempt to correct obvious errors, but I expect that there are errors of grammar and possibly content that I did not discover before finalizing the note.

## 2024-05-03 DIAGNOSIS — E78.5 DYSLIPIDEMIA: ICD-10-CM

## 2024-05-03 DIAGNOSIS — I10 ESSENTIAL HYPERTENSION: ICD-10-CM

## 2024-05-03 PROCEDURE — RXMED WILLOW AMBULATORY MEDICATION CHARGE: Performed by: INTERNAL MEDICINE

## 2024-05-03 PROCEDURE — RXMED WILLOW AMBULATORY MEDICATION CHARGE: Performed by: PHYSICIAN ASSISTANT

## 2024-05-03 RX ORDER — AMLODIPINE BESYLATE 5 MG/1
5 TABLET ORAL DAILY
Qty: 90 TABLET | Refills: 3 | Status: SHIPPED | OUTPATIENT
Start: 2024-05-03

## 2024-05-03 RX ORDER — ROSUVASTATIN CALCIUM 40 MG/1
40 TABLET, COATED ORAL DAILY
Qty: 90 TABLET | Refills: 3 | Status: SHIPPED | OUTPATIENT
Start: 2024-05-03

## 2024-05-03 NOTE — TELEPHONE ENCOUNTER
Received request via: Pharmacy    Was the patient seen in the last year in this department? Yes    Does the patient have an active prescription (recently filled or refills available) for medication(s) requested? No    Pharmacy Name: [facility]    Does the patient have shelter Plus and need 100 day supply (blood pressure, diabetes and cholesterol meds only)? Patient does not have SCP

## 2024-05-06 ENCOUNTER — PHARMACY VISIT (OUTPATIENT)
Dept: PHARMACY | Facility: MEDICAL CENTER | Age: 64
End: 2024-05-06
Payer: COMMERCIAL

## 2024-05-10 ENCOUNTER — PHARMACY VISIT (OUTPATIENT)
Dept: PHARMACY | Facility: MEDICAL CENTER | Age: 64
End: 2024-05-10
Payer: COMMERCIAL

## 2024-05-10 ENCOUNTER — OFFICE VISIT (OUTPATIENT)
Dept: MEDICAL GROUP | Facility: MEDICAL CENTER | Age: 64
End: 2024-05-10
Payer: COMMERCIAL

## 2024-05-10 VITALS
HEART RATE: 94 BPM | TEMPERATURE: 97.7 F | HEIGHT: 60 IN | BODY MASS INDEX: 34.55 KG/M2 | DIASTOLIC BLOOD PRESSURE: 60 MMHG | RESPIRATION RATE: 16 BRPM | OXYGEN SATURATION: 100 % | WEIGHT: 176 LBS | SYSTOLIC BLOOD PRESSURE: 120 MMHG

## 2024-05-10 DIAGNOSIS — M54.41 CHRONIC RIGHT-SIDED LOW BACK PAIN WITH RIGHT-SIDED SCIATICA: ICD-10-CM

## 2024-05-10 DIAGNOSIS — G89.29 CHRONIC RIGHT-SIDED LOW BACK PAIN WITH RIGHT-SIDED SCIATICA: ICD-10-CM

## 2024-05-10 PROCEDURE — 3074F SYST BP LT 130 MM HG: CPT | Performed by: PHYSICIAN ASSISTANT

## 2024-05-10 PROCEDURE — 3078F DIAST BP <80 MM HG: CPT | Performed by: PHYSICIAN ASSISTANT

## 2024-05-10 PROCEDURE — RXMED WILLOW AMBULATORY MEDICATION CHARGE: Performed by: PHYSICIAN ASSISTANT

## 2024-05-10 PROCEDURE — 99214 OFFICE O/P EST MOD 30 MIN: CPT | Performed by: PHYSICIAN ASSISTANT

## 2024-05-10 RX ORDER — METHYLPREDNISOLONE 4 MG/1
TABLET ORAL
Qty: 21 TABLET | Refills: 0 | Status: SHIPPED | OUTPATIENT
Start: 2024-05-10

## 2024-05-10 RX ORDER — GABAPENTIN 100 MG/1
100 CAPSULE ORAL 3 TIMES DAILY
Qty: 90 CAPSULE | Refills: 1 | Status: SHIPPED | OUTPATIENT
Start: 2024-05-10 | End: 2024-07-09

## 2024-05-10 RX ORDER — IBUPROFEN 800 MG/1
800 TABLET ORAL EVERY 8 HOURS PRN
Qty: 60 TABLET | Refills: 1 | Status: SHIPPED | OUTPATIENT
Start: 2024-05-10 | End: 2024-06-09

## 2024-05-10 NOTE — PROGRESS NOTES
Subjective:     History of Present Illness  The patient presents for evaluation of chronic low back pain with right leg sciatica.    The patient is experiencing persistent right-sided sciatic nerve pain, which she describes as a pinching sensation and a sensation akin to a knot. The pain, which radiates to her feet, is severe enough to disrupt her sleep. She has attempted to manage the pain with over-the-counter medications such as Advil, Motrin, Tylenol, pain patches, and sprays, but to no avail. She is contemplating seeking pain management or receiving cortisone injections. Previous attempts to manage the pain with gabapentin 100 mg were unsuccessful due to the patient's need to consume 3 to 4 doses. The patient expresses a desire to revert to ibuprofen 800 mg, having exhausted previous prescriptions and is open to retrying gabapentin. She reports difficulty in raising her leg due to pain, which persisted throughout the week, necessitating rest. Her current pain management regimen includes 1 Advil pill in the morning, followed by another dose around 4:00 or 5:00 before bedtime. The pain impedes her ability to ascend stairs, necessitating a single step at a time. She has previously consulted with Dr. Maxwell, who found no relief. The patient has declined the option of back surgery.      Current medicines (including changes today)  Current Outpatient Medications   Medication Sig Dispense Refill    ibuprofen (MOTRIN) 800 MG Tab Take 1 Tablet by mouth every 8 hours as needed for Moderate Pain (Back pain) for up to 30 days. 60 Tablet 1    gabapentin (NEURONTIN) 100 MG Cap Take 1 Capsule by mouth 3 times a day for 60 days. 90 Capsule 1    methylPREDNISolone (MEDROL DOSEPAK) 4 MG Tablet Therapy Pack As directed on the packaging label. 21 Tablet 0    rosuvastatin (CRESTOR) 40 MG tablet Take 1 Tablet by mouth every day. 90 Tablet 3    amLODIPine (NORVASC) 5 MG Tab Take 1 Tablet by mouth every day. 90 Tablet 3    Semaglutide,  2 MG/DOSE, (OZEMPIC, 2 MG/DOSE,) 8 MG/3ML Solution Pen-injector Inject 2 MG under the skin every 7 days 3 mL 5    lisinopril (PRINIVIL) 20 MG Tab Take 1 Tablet by mouth every day. 90 Tablet 2    estradiol (ESTRACE) 1 MG Tab Take 1 Tablet by mouth every day. 90 Tablet 2    omeprazole (PRILOSEC) 40 MG delayed-release capsule Take 1 Capsule by mouth every day. 90 Capsule 2    Insulin Degludec (TRESIBA FLEXTOUCH) 200 UNIT/ML Solution Pen-injector Inject 40 Units under the skin at bedtime. 90 day 27 mL 3     No current facility-administered medications for this visit.     She  has a past medical history of Diabetes, Hyperlipidemia, and Hypertension.    ROS   No chest pain, no shortness of breath, no abdominal pain  Positive ROS as per HPI.  All other systems reviewed and are negative.     Objective:     /60 (BP Location: Right arm, Patient Position: Sitting, BP Cuff Size: Adult)   Pulse 94   Temp 36.5 °C (97.7 °F) (Temporal)   Resp 16   Ht 1.524 m (5')   Wt 79.8 kg (176 lb)   SpO2 100%  Body mass index is 34.37 kg/m².   Physical Exam    Constitutional: Alert, no distress.  Skin: Warm, dry, good turgor, no rashes in visible areas.  Eye: Equal, round and reactive, conjunctiva clear, lids normal.  ENMT: Lips without lesions, good dentition, oropharynx clear.  Psych: Alert and oriented x3, normal affect and mood.      Results          Assessment and Plan:   The following treatment plan was discussed    Assessment & Plan  1. Chronic low back pain accompanied by right leg sciatica.  The patient's condition is chronic, with a recent exacerbation. She has previously consulted with Dr. Maxwell. A referral to pain management has been made, with a recommendation for the patient to follow up with Dr. Padilla. The patient has been prescribed ibuprofen, which she is advised to take with food, and to continue her omeprazole regimen, which she only halves. She may take them as needed. Gabapentin 100 mg has been renewed, to be  taken three times daily, with a plan to gradually increase this dosage into her daily regimen. A Medrol Dosepak has been prescribed to reduce the inflammatory effects of the back.      ORDERS:  1. Chronic right-sided low back pain with right-sided sciatica    - Referral to Pain Management  - ibuprofen (MOTRIN) 800 MG Tab; Take 1 Tablet by mouth every 8 hours as needed for Moderate Pain (Back pain) for up to 30 days.  Dispense: 60 Tablet; Refill: 1  - gabapentin (NEURONTIN) 100 MG Cap; Take 1 Capsule by mouth 3 times a day for 60 days.  Dispense: 90 Capsule; Refill: 1  - methylPREDNISolone (MEDROL DOSEPAK) 4 MG Tablet Therapy Pack; As directed on the packaging label.  Dispense: 21 Tablet; Refill: 0        Please note that this dictation was created using voice recognition software. I have made every reasonable attempt to correct obvious errors, but I expect that there are errors of grammar and possibly content that I did not discover before finalizing the note.      Attestation      Verbal consent was acquired by the patient to use [employer] ambient listening note generation during this visit: Yes

## 2024-05-14 ENCOUNTER — OFFICE VISIT (OUTPATIENT)
Dept: PHYSICAL MEDICINE AND REHAB | Facility: MEDICAL CENTER | Age: 64
End: 2024-05-14
Payer: COMMERCIAL

## 2024-05-14 VITALS
TEMPERATURE: 97.9 F | HEIGHT: 60 IN | SYSTOLIC BLOOD PRESSURE: 157 MMHG | HEART RATE: 97 BPM | WEIGHT: 176.37 LBS | OXYGEN SATURATION: 98 % | DIASTOLIC BLOOD PRESSURE: 100 MMHG | BODY MASS INDEX: 34.63 KG/M2

## 2024-05-14 DIAGNOSIS — M51.36 DDD (DEGENERATIVE DISC DISEASE), LUMBAR: ICD-10-CM

## 2024-05-14 DIAGNOSIS — Z91.81 RISK FOR FALLS: ICD-10-CM

## 2024-05-14 DIAGNOSIS — M48.061 SPINAL STENOSIS OF LUMBAR REGION, UNSPECIFIED WHETHER NEUROGENIC CLAUDICATION PRESENT: ICD-10-CM

## 2024-05-14 DIAGNOSIS — M47.816 LUMBAR SPONDYLOSIS: ICD-10-CM

## 2024-05-14 DIAGNOSIS — M79.10 MYALGIA: ICD-10-CM

## 2024-05-14 DIAGNOSIS — R26.9 GAIT ABNORMALITY: ICD-10-CM

## 2024-05-14 DIAGNOSIS — M54.50 LOW BACK PAIN WITH RADIATION: ICD-10-CM

## 2024-05-14 PROCEDURE — 99214 OFFICE O/P EST MOD 30 MIN: CPT | Mod: 25 | Performed by: STUDENT IN AN ORGANIZED HEALTH CARE EDUCATION/TRAINING PROGRAM

## 2024-05-14 PROCEDURE — 1125F AMNT PAIN NOTED PAIN PRSNT: CPT | Performed by: STUDENT IN AN ORGANIZED HEALTH CARE EDUCATION/TRAINING PROGRAM

## 2024-05-14 PROCEDURE — 76942 ECHO GUIDE FOR BIOPSY: CPT | Performed by: STUDENT IN AN ORGANIZED HEALTH CARE EDUCATION/TRAINING PROGRAM

## 2024-05-14 PROCEDURE — 3077F SYST BP >= 140 MM HG: CPT | Performed by: STUDENT IN AN ORGANIZED HEALTH CARE EDUCATION/TRAINING PROGRAM

## 2024-05-14 PROCEDURE — 20553 NJX 1/MLT TRIGGER POINTS 3/>: CPT | Performed by: STUDENT IN AN ORGANIZED HEALTH CARE EDUCATION/TRAINING PROGRAM

## 2024-05-14 PROCEDURE — 3080F DIAST BP >= 90 MM HG: CPT | Performed by: STUDENT IN AN ORGANIZED HEALTH CARE EDUCATION/TRAINING PROGRAM

## 2024-05-14 ASSESSMENT — PAIN SCALES - GENERAL: PAINLEVEL: 5=MODERATE PAIN

## 2024-05-14 ASSESSMENT — PATIENT HEALTH QUESTIONNAIRE - PHQ9
5. POOR APPETITE OR OVEREATING: 0 - NOT AT ALL
SUM OF ALL RESPONSES TO PHQ QUESTIONS 1-9: 3
CLINICAL INTERPRETATION OF PHQ2 SCORE: 3

## 2024-05-14 NOTE — PROCEDURES
Patient Name: Rosario Blevins  : 1960  Date of Service: 2024    Physician/s: Nahed Briggs MD    Pre-operative Diagnosis: Myalgia (M79.1)    Post-operative Diagnosis: Myalgia (M79.1)    Procedure: trigger point injections of the following muscles:    Site R L   Splenius capitis     Splenius cervicis     Sternocleidomastoid     Rhomboids     Levator scapulae     Pectoralis minor     Pectoralis major     Serratus anterior     Tensor fascia alfredo x    Peroneus longus x    Peroneus brevis x    quadriceps x    Paravertebral, lumbar     Gluteus trish     Gluteus medius     Gluteus minimus     Tensor fascia alfredo     Vastus lateralis     Adductor waleska     Adductor longus     Occipitalis     Cervical paraspinal     Trapezius, upper     Trapezius, mid     Trapezius, lower     Latissimus dorsi       Description of procedure:    The risks, benefits, and alternatives of the procedure were reviewed and discussed with the patient.  Written informed consent was freely obtained. A pre-procedural time-out was conducted by the physician verifying patient’s identity, procedure to be performed, procedure site and side, and allergy verification. Appropriate equipment was determined to be in place for the procedure.     In the office suite exam room the patient was placed in a prone position and the skin areas for injection over the above muscles were marked. A total of 3 areas of pain were identified for injection. The areas of pain were then prepped and draped in the usual sterile fashion. A solution was prepared with 5 mL of 1% lidocaine and 5 mL of 0.5% bupivacaine. Ultrasound was confirmed to view the adjacent structures for blood vessels and nerves and to confirm the needle path was not within the structures. A 27g needle was placed into each of the markings at the areas above under ultrasound guidance with an out of plane approach. After negative aspiration, approximately 2.5 mL of the above solution was  injected. The needle was removed intact after each trigger point injection, and the patient's back was covered with a 4x4 gauze, the area was cleansed with sterile normal saline, and a dressing was applied. There were no complications noted. The images were uploaded to our media tab for permanent storage.    Patient noted improvement in pain with the procedure.    Nahed Briggs MD  Interventional Pain and Spine  Physical Medicine and Rehabilitation  [facility]

## 2024-05-14 NOTE — PROGRESS NOTES
New Patient Note    Interventional Pain and Spine  Physiatry (Physical Medicine and Rehabilitation)     Patient Name: Rosario Blevins  : 1960  Date of Service: 2024  PCP: Yaya Frank P.A.-C.  Referring Provider: Yaya Frank P.A.-*    Chief Complaint:   Chief Complaint   Patient presents with    New Patient     Low back pain with radiation       HPI  HISTORY FROM INITIAL VISIT (2024):  Rosario Blevins is a 63 y.o. female  at [employer] who presents today with pain radiating down her lateral right leg. Burning. Has had pain for years, and worsened sciatic pain since twisting the wrong way 1 year ago. Now worsening. Denies significant numbness or tingling.  She reports she has done a home exercise program diligently, but still with ongoing symptoms.  She reports some short-lived relief with massaging her right thigh at home.  She reports pain limited weakness of her right leg.  She reports she does not have pain at the time to go to physical therapy with her work schedule.    Pain right now is 5/10 on the numeric pain scale. Her pain at its best-worse level during the course of the day is typically 6-10/10, respectively.  Pain worsens with walking upstairs and walking downstairs and improves with sitting. Her pain significantly interferes with ADLs by the end of her workday.     The patient has completed a provider driven physical therapy program for this problem.    Patient has tried the following medications with varied success (current meds in bold):   Ibuprofen 800mg QAM  Gabapentin - SE tiredness  CBD oil    Therapeutic modalities and interventional therapies to date include:  -No targeted injections    Psychological testing for pain as depression and pain commonly coexist and need to both be treated.     Opioid Risk Score: 0      Interpretation of Opioid Risk Score   Score 0-3 = Low risk of abuse. Do UDS at least once per year.  Score 4-7 = Moderate risk of abuse.  Do UDS 1-4 times per year.  Score 8+ = High risk of abuse. Refer to specialist.    PHQ      2023     4:00 PM 4/10/2024     8:00 AM 2024     8:00 AM   Depression Screen (PHQ-2/PHQ-9)   PHQ-2 Total Score 0 0 3   PHQ-9 Total Score   3       Interpretation of PHQ-9 Total Score   Score Severity   1-4 No Depression   5-9 Mild Depression   10-14 Moderate Depression   15-19 Moderately Severe Depression   20-27 Severe Depression      Medical records review:  I reviewed the note from the referring provider Yaya Frank P.A.-* including the note dated 5/10/24 and the note from Dr. Maxwell 23 indicating med management and home exercise program     ROS:   Red Flags ROS:   Fever, Chills, Sweats: Denies  Involuntary Weight Loss: Denies  Bladder Incontinence: Denies  Bowel Incontinence: denies  Saddle Anesthesia: Denies    All other systems reviewed and negative.     PMHx:   Past Medical History:   Diagnosis Date    Diabetes     Hyperlipidemia     Hypertension        PSHx:   Past Surgical History:   Procedure Laterality Date    CATARACT EXTRACTION WITH IOL Bilateral 2020    HYSTERECTOMY, TOTAL ABDOMINAL      US-NEEDLE CORE BX-BREAST PANEL         Family Hx:   Family History   Problem Relation Age of Onset    Other Mother 47        Liver cancer    Cancer Mother         Liver CA    Other Sister         6 months    Other Brother         at birth    Heart Attack Maternal Uncle 40    Heart Attack Maternal Grandmother 50    Heart Attack Maternal Uncle 38    No Known Problems Father        Social Hx:  Social History     Socioeconomic History    Marital status: Single     Spouse name: Not on file    Number of children: Not on file    Years of education: Not on file    Highest education level: Not on file   Occupational History    Not on file   Tobacco Use    Smoking status: Former     Current packs/day: 0.00     Types: Cigarettes     Quit date: 1991     Years since quittin.2    Smokeless tobacco: Never   Vaping  Use    Vaping Use: Never used   Substance and Sexual Activity    Alcohol use: No    Drug use: No    Sexual activity: Not on file     Comment: ,17-year male , no children   Other Topics Concern    Not on file   Social History Narrative    Works at [employer]     Financial Resource Strain: Not on file   Food Insecurity: Not on file   Transportation Needs: Not on file   Physical Activity: Not on file   Stress: Not on file   Social Connections: Not on file   Intimate Partner Violence: Not on file   Housing Stability: Not on file       Allergies:  Allergies   Allergen Reactions    Codeine Unspecified     Unknown side effects.    Farxiga [Dapagliflozin] Diarrhea     Diarrhea and UTIs    Metformin Diarrhea     Diarrhea    Milk Products Food Allergy Vomiting       Medications: reviewed on epic.   Outpatient Medications Marked as Taking for the 5/14/24 encounter (Office Visit) with Nahed Briggs M.D.   Medication Sig Dispense Refill    ibuprofen (MOTRIN) 800 MG Tab Take 1 Tablet by mouth every 8 hours as needed for Moderate Pain (Back pain) for up to 30 days. 60 Tablet 1    gabapentin (NEURONTIN) 100 MG Cap Take 1 Capsule by mouth 3 times a day for 60 days. 90 Capsule 1    methylPREDNISolone (MEDROL DOSEPAK) 4 MG Tablet Therapy Pack As directed on the packaging label. 21 Tablet 0    rosuvastatin (CRESTOR) 40 MG tablet Take 1 Tablet by mouth every day. 90 Tablet 3    amLODIPine (NORVASC) 5 MG Tab Take 1 Tablet by mouth every day. 90 Tablet 3    Semaglutide, 2 MG/DOSE, (OZEMPIC, 2 MG/DOSE,) 8 MG/3ML Solution Pen-injector Inject 2 MG under the skin every 7 days 3 mL 5    lisinopril (PRINIVIL) 20 MG Tab Take 1 Tablet by mouth every day. 90 Tablet 2    estradiol (ESTRACE) 1 MG Tab Take 1 Tablet by mouth every day. 90 Tablet 2    omeprazole (PRILOSEC) 40 MG delayed-release capsule Take 1 Capsule by mouth every day. 90 Capsule 2    Insulin Degludec (TRESIBA FLEXTOUCH) 200  UNIT/ML Solution Pen-injector Inject 40 Units under the skin at bedtime. 90 day 27 mL 3        Current Outpatient Medications on File Prior to Visit   Medication Sig Dispense Refill    ibuprofen (MOTRIN) 800 MG Tab Take 1 Tablet by mouth every 8 hours as needed for Moderate Pain (Back pain) for up to 30 days. 60 Tablet 1    gabapentin (NEURONTIN) 100 MG Cap Take 1 Capsule by mouth 3 times a day for 60 days. 90 Capsule 1    methylPREDNISolone (MEDROL DOSEPAK) 4 MG Tablet Therapy Pack As directed on the packaging label. 21 Tablet 0    rosuvastatin (CRESTOR) 40 MG tablet Take 1 Tablet by mouth every day. 90 Tablet 3    amLODIPine (NORVASC) 5 MG Tab Take 1 Tablet by mouth every day. 90 Tablet 3    Semaglutide, 2 MG/DOSE, (OZEMPIC, 2 MG/DOSE,) 8 MG/3ML Solution Pen-injector Inject 2 MG under the skin every 7 days 3 mL 5    lisinopril (PRINIVIL) 20 MG Tab Take 1 Tablet by mouth every day. 90 Tablet 2    estradiol (ESTRACE) 1 MG Tab Take 1 Tablet by mouth every day. 90 Tablet 2    omeprazole (PRILOSEC) 40 MG delayed-release capsule Take 1 Capsule by mouth every day. 90 Capsule 2    Insulin Degludec (TRESIBA FLEXTOUCH) 200 UNIT/ML Solution Pen-injector Inject 40 Units under the skin at bedtime. 90 day 27 mL 3     No current facility-administered medications on file prior to visit.         EXAMINATION     Physical Exam:   BP (!) 157/100 (BP Location: Right arm, Patient Position: Sitting, BP Cuff Size: Adult)   Pulse 97   Temp 36.6 °C (97.9 °F) (Temporal)   Ht 1.524 m (5')   Wt 80 kg (176 lb 5.9 oz)   SpO2 98%     Constitutional:   Body Habitus: Body mass index is 34.44 kg/m².  Cooperation: Fully cooperates with exam  Appearance: Well-groomed, well-nourished.    Eyes: No scleral icterus to suggest severe liver disease, no proptosis to suggest severe hyperthyroidism    ENT -no obvious auditory deficits, no noticeable facial droop     Skin -no rashes or lesions noted     Respiratory-  breathing comfortably on room air, no  audible wheezing    Cardiovascular-distal extremities warm and well perfused.  No lower extremity edema is noted.     Gastrointestinal - no obvious abdominal masses, non-distended    Psychiatric- alert and oriented ×3. Normal affect.     Gait - normal gait, no use of ambulatory device, nonantalgic. Heel walking and toe walking intact.    Musculoskeletal and Neuro -       Thoracic/Lumbar Spine/Sacral Spine/Hips   Inspection: No evidence of atrophy in bilateral lower extremities throughout     There is limited active range of motion with lumbar extension    Facet loading maneuver negative bilaterally    Palpation:   Tenderness to palpation over the  right lateral thigh overlying TFL, right quadriceps, and right peroneus longus and brevis muscles . No tenderness to palpation elsewhere in the low back/hips including midline of lumbosacral spine, paraspinal muscles bilaterally, and lumbar facets bilaterally spanning approximately L1-L5 levels.    Lumbar spine /hip provocative exam maneuvers  Straight leg raise negative bilaterally  FADIR test negative bilaterally  Slump-sit test negative bilaterally  Piriformis test negative on right    SI joint tests  NORIS test negative bilaterally  Thigh thrust test negative bilaterally    Key points for the international standards for neurological classification of spinal cord injury (ISNCSCI) to light touch.   Dermatome R L   L2 2 2   L3 2 2   L4 2 2   L5 2 2   S1 2 2   S2 2 2       Motor Exam Lower Extremities  ? Myotome R L   Hip flexion L2 5 5   Knee extension L3 5 5   Ankle dorsiflexion L4 5 5   Toe extension L5 5 5   Ankle plantarflexion S1 5 5       Reflexes  ?  R L   Patella  2+ 2+   Achilles   2+ 2+     Clonus of the ankle negative bilaterally       MEDICAL DECISION MAKING    Medical records review: see under HPI section.     DATA    Labs: Personally reviewed at today's visit:     Lab Results   Component Value Date/Time    SODIUM 135 03/27/2024 06:37 AM    POTASSIUM 4.4  "03/27/2024 06:37 AM    CHLORIDE 100 03/27/2024 06:37 AM    CO2 24 03/27/2024 06:37 AM    ANION 11.0 03/27/2024 06:37 AM    GLUCOSE 161 (H) 03/27/2024 06:37 AM    BUN 16 03/27/2024 06:37 AM    CREATININE 0.48 (L) 03/27/2024 06:37 AM    CALCIUM 9.3 03/27/2024 06:37 AM    ASTSGOT 32 03/27/2024 06:37 AM    ALTSGPT 39 03/27/2024 06:37 AM    TBILIRUBIN 0.5 03/27/2024 06:37 AM    ALBUMIN 4.2 03/27/2024 06:37 AM    TOTPROTEIN 7.2 03/27/2024 06:37 AM    GLOBULIN 3.0 03/27/2024 06:37 AM    AGRATIO 1.4 03/27/2024 06:37 AM       No results found for: \"PROTHROMBTM\", \"INR\"     Lab Results   Component Value Date/Time    WBC 6.6 05/12/2016 06:49 AM    RBC 5.17 05/12/2016 06:49 AM    HEMOGLOBIN 14.8 05/12/2016 06:49 AM    HEMATOCRIT 45.1 05/12/2016 06:49 AM    MCV 87.2 05/12/2016 06:49 AM    MCH 28.6 05/12/2016 06:49 AM    MCHC 32.8 (L) 05/12/2016 06:49 AM    MPV 11.0 05/12/2016 06:49 AM    NEUTSPOLYS 44.70 10/07/2015 07:29 AM    LYMPHOCYTES 45.70 (H) 10/07/2015 07:29 AM    MONOCYTES 7.20 10/07/2015 07:29 AM    EOSINOPHILS 1.60 10/07/2015 07:29 AM    BASOPHILS 0.60 10/07/2015 07:29 AM    HYPOCHROMIA 1+ 02/05/2014 07:33 AM        Lab Results   Component Value Date/Time    HBA1C 7.3 (A) 04/03/2024 07:26 AM        Imaging:   I personally reviewed following images, these are my reads  MRI lumbar spine 5/31/2023  At L2-3 there is mild bilateral neuroforaminal stenosis.  At L4-5 there is mild central canal stenosis and mild left-sided neuroforaminal stenosis.  L5-S1 there is no significant central canal stenosis or neuroforaminal stenosis. See formal radiology report for further details.          IMAGING radiology reads. I reviewed the following radiology reads                      Results for orders placed in visit on 05/31/23    MR-LUMBAR SPINE-W/O    Impression  Broad-based disc bulge at L2-3 with bilateral mild lateral recess narrowing and bilateral mild foraminal narrowing.    Broad-based disc bulge at L3-4 and L4-5 causes minimal " canal narrowing.        Results for orders placed in visit on 23    MR-LUMBAR SPINE-W/O    Impression  Broad-based disc bulge at L2-3 with bilateral mild lateral recess narrowing and bilateral mild foraminal narrowing.    Broad-based disc bulge at L3-4 and L4-5 causes minimal canal narrowing.                                          Results for orders placed in visit on 13    DX-CHEST-2 VIEWS    Impression  No active disease.            INTERPRETING LOCATION: [address] [place] [address], [id number]                      Results for orders placed during the hospital encounter of 23    DX-LUMBAR SPINE-2 OR 3 VIEWS    Impression  No acute osseous abnormality.  No malalignment.  Moderate degenerative change of the lumbar spine, most at L2-3.                  Results for orders placed during the hospital encounter of 17    DX-WRIST-COMPLETE 3+ LEFT    Impression  1.  Negative for left wrist fracture or malalignment    2.  Mild osteoarthritis         Diagnosis  Visit Diagnoses     ICD-10-CM   1. Myalgia  M79.10         ASSESSMENT AND PLAN:  Rosario Blevins ( 1960) is a female presenting with pain radiating down her right leg in the L5 distribution that sounds neuropathic in nature, however has a negative piriformis test on exam and no signs of corresponding nerve impingement on her MRI lumbar spine.  She also has tenderness to palpation at her right TFL muscle that can reproduce her pain.  At this time, her pain appears to be more myalgia in nature.     Rosario was seen today for new patient.    Diagnoses and all orders for this visit:    Myalgia  -     Consent for all Surgical, Special Diagnostic or Therapeutic Procedures          PLAN  Physical Therapy: Discussed possible physical therapy.  The patient reports that she does not have time to pursue physical therapy due to her work schedule.  Continue home exercise program    Diagnostic workup: Personally reviewed at today's visit: MRI  lumbar spine 5/31/2023    Medications:   -Okay to continue ibuprofen and gabapentin as per PCP.     Interventions:   - Trigger point injections under ultrasound guidance today. The risks, benefits, and alternatives to this procedure were discussed and the patient wishes to proceed with the procedure. Risks include but are not limited to damage to surrounding structures, infection, bleeding, worsening of pain which can be permanent. Benefits include pain relief and improved function. Alternatives include not doing the procedure.     Follow-up: 1 month to assess progress with today's trigger point injections and home exercise program    Orders Placed This Encounter    Consent for all Surgical, Special Diagnostic or Therapeutic Procedures       Nahed Briggs MD  Interventional Pain and Spine  Physical Medicine and Rehabilitation  [facility]    CC Yaya Frank, ADINA.-*     The above note documents my personal evaluation of this patient. In addition, I have reviewed and confirmed with the patient and MA the supportive information documented in today's Patient Health Questionnaire and Office Note.     Please note that this dictation was created using voice recognition software. I have made every reasonable attempt to correct obvious errors, but I expect that there are errors of grammar and possibly content that I did not discover before finalizing the note.

## 2024-06-03 PROCEDURE — RXMED WILLOW AMBULATORY MEDICATION CHARGE: Performed by: PHYSICIAN ASSISTANT

## 2024-06-04 ENCOUNTER — PHARMACY VISIT (OUTPATIENT)
Dept: PHARMACY | Facility: MEDICAL CENTER | Age: 64
End: 2024-06-04
Payer: COMMERCIAL

## 2024-06-04 ENCOUNTER — OFFICE VISIT (OUTPATIENT)
Dept: MEDICAL GROUP | Facility: MEDICAL CENTER | Age: 64
End: 2024-06-04
Payer: COMMERCIAL

## 2024-06-04 VITALS
HEIGHT: 60 IN | BODY MASS INDEX: 34.36 KG/M2 | SYSTOLIC BLOOD PRESSURE: 138 MMHG | TEMPERATURE: 97.3 F | OXYGEN SATURATION: 97 % | WEIGHT: 175 LBS | HEART RATE: 100 BPM | DIASTOLIC BLOOD PRESSURE: 84 MMHG

## 2024-06-04 DIAGNOSIS — J01.10 SUBACUTE FRONTAL SINUSITIS: ICD-10-CM

## 2024-06-04 DIAGNOSIS — G89.29 CHRONIC RIGHT-SIDED LOW BACK PAIN WITH RIGHT-SIDED SCIATICA: ICD-10-CM

## 2024-06-04 DIAGNOSIS — M54.41 CHRONIC RIGHT-SIDED LOW BACK PAIN WITH RIGHT-SIDED SCIATICA: ICD-10-CM

## 2024-06-04 PROCEDURE — 99214 OFFICE O/P EST MOD 30 MIN: CPT | Performed by: STUDENT IN AN ORGANIZED HEALTH CARE EDUCATION/TRAINING PROGRAM

## 2024-06-04 PROCEDURE — 3079F DIAST BP 80-89 MM HG: CPT | Performed by: STUDENT IN AN ORGANIZED HEALTH CARE EDUCATION/TRAINING PROGRAM

## 2024-06-04 PROCEDURE — RXMED WILLOW AMBULATORY MEDICATION CHARGE: Performed by: STUDENT IN AN ORGANIZED HEALTH CARE EDUCATION/TRAINING PROGRAM

## 2024-06-04 PROCEDURE — 3075F SYST BP GE 130 - 139MM HG: CPT | Performed by: STUDENT IN AN ORGANIZED HEALTH CARE EDUCATION/TRAINING PROGRAM

## 2024-06-04 RX ORDER — METHYLPREDNISOLONE 4 MG/1
TABLET ORAL
Qty: 21 TABLET | Refills: 0 | Status: SHIPPED | OUTPATIENT
Start: 2024-06-04

## 2024-06-04 RX ORDER — FLUTICASONE PROPIONATE 50 MCG
1 SPRAY, SUSPENSION (ML) NASAL DAILY
Qty: 16 G | Refills: 0 | Status: SHIPPED | OUTPATIENT
Start: 2024-06-04

## 2024-06-04 RX ORDER — AMOXICILLIN 875 MG/1
875 TABLET, COATED ORAL 2 TIMES DAILY
Qty: 14 TABLET | Refills: 0 | Status: SHIPPED | OUTPATIENT
Start: 2024-06-04 | End: 2024-06-10 | Stop reason: SDUPTHER

## 2024-06-04 NOTE — PROGRESS NOTES
Subjective:     CC: Sinus pain    Verbal consent was acquired by the patient to use [employer] ambient listening note generation during this visit Yes     HPI:   Rosario presents today with    History of Present Illness  The patient presents for evaluation of multiple medical concerns.    The patient has been experiencing sinus headaches for the past few weeks, accompanied by nasal congestion and expectoration of yellow mucus for the past 10 days, which has since transitioned to brown. Concurrently, she has been experiencing voice loss. Despite attempts at self-treatment with saltwater gargles, her symptoms have not improved. She reports feeling hot and sweaty. Her ocular symptoms include puffiness, ear drainage, and throat pain, which began on Saturday. Over the past 4 days, she has been managing her throat symptoms with chicken broth and fresh garlic and gin ariana, which have not provided relief. She has previously found relief from sinus packages with hot water, but is no longer able to locate them. She has also tried Sudafed and Claritin, both of which have not provided relief.    The patient also reports chronic back pain.   She is allergic to CODEINE.        ROS:  ROS    Objective:     Exam:  /84   Pulse 100   Temp 36.3 °C (97.3 °F)   Ht 1.524 m (5')   Wt 79.4 kg (175 lb)   SpO2 97%   BMI 34.18 kg/m²  Body mass index is 34.18 kg/m².    Physical Exam  Vitals reviewed.   Constitutional:       General: She is not in acute distress.     Appearance: She is not toxic-appearing.   HENT:      Head: Normocephalic and atraumatic.      Right Ear: External ear normal.      Left Ear: External ear normal.      Nose: Congestion present.      Mouth/Throat:      Mouth: Mucous membranes are moist.      Pharynx: Posterior oropharyngeal erythema present. No oropharyngeal exudate.   Eyes:      General:         Right eye: No discharge.         Left eye: No discharge.      Extraocular Movements: Extraocular movements  intact.      Conjunctiva/sclera: Conjunctivae normal.   Pulmonary:      Effort: Pulmonary effort is normal. No respiratory distress.   Skin:     General: Skin is warm and dry.   Neurological:      Mental Status: She is alert.   Psychiatric:         Mood and Affect: Mood normal.         Behavior: Behavior normal.         Thought Content: Thought content normal.         Judgment: Judgment normal.           Assessment & Plan:     63 y.o. female with the following -     Assessment & Plan  1. Subacute bacterial sinusitis.  A prescription for amoxicillin, a Medrol Dosepak, and Flonase has been issued.    2. Chronic back pain.  Given that she has used her previous Medrol Dosepak, it is available for use to manage her chronic back pain. A Medrol Dosepak has been prescribed for her.      No follow-ups on file.    Please note that this dictation was created using voice recognition software. I have made every reasonable attempt to correct obvious errors, but I expect that there are errors of grammar and possibly content that I did not discover before finalizing the note.

## 2024-06-05 PROCEDURE — RXMED WILLOW AMBULATORY MEDICATION CHARGE: Performed by: INTERNAL MEDICINE

## 2024-06-06 ENCOUNTER — PHARMACY VISIT (OUTPATIENT)
Dept: PHARMACY | Facility: MEDICAL CENTER | Age: 64
End: 2024-06-06
Payer: COMMERCIAL

## 2024-06-10 DIAGNOSIS — J01.10 SUBACUTE FRONTAL SINUSITIS: ICD-10-CM

## 2024-06-10 RX ORDER — AMOXICILLIN 875 MG/1
875 TABLET, COATED ORAL 2 TIMES DAILY
Qty: 14 TABLET | Refills: 0 | OUTPATIENT
Start: 2024-06-10

## 2024-06-10 NOTE — TELEPHONE ENCOUNTER
Received request via: Pharmacy    Was the patient seen in the last year in this department? Yes    Does the patient have an active prescription (recently filled or refills available) for medication(s) requested? No    Pharmacy Name: [facility]    Does the patient have residential Plus and need 100 day supply (blood pressure, diabetes and cholesterol meds only)? Patient does not have SCP

## 2024-06-11 ENCOUNTER — PHARMACY VISIT (OUTPATIENT)
Dept: PHARMACY | Facility: MEDICAL CENTER | Age: 64
End: 2024-06-11
Payer: COMMERCIAL

## 2024-06-11 ENCOUNTER — OFFICE VISIT (OUTPATIENT)
Dept: PHYSICAL MEDICINE AND REHAB | Facility: MEDICAL CENTER | Age: 64
End: 2024-06-11
Payer: COMMERCIAL

## 2024-06-11 VITALS
SYSTOLIC BLOOD PRESSURE: 125 MMHG | BODY MASS INDEX: 33.93 KG/M2 | TEMPERATURE: 97.6 F | DIASTOLIC BLOOD PRESSURE: 85 MMHG | HEART RATE: 105 BPM | OXYGEN SATURATION: 97 % | WEIGHT: 173.72 LBS

## 2024-06-11 DIAGNOSIS — M54.50 LOW BACK PAIN WITH RADIATION: ICD-10-CM

## 2024-06-11 DIAGNOSIS — M25.652 HIP JOINT STIFFNESS, BILATERAL: ICD-10-CM

## 2024-06-11 DIAGNOSIS — M47.816 LUMBAR SPONDYLOSIS: ICD-10-CM

## 2024-06-11 DIAGNOSIS — M25.651 HIP JOINT STIFFNESS, BILATERAL: ICD-10-CM

## 2024-06-11 DIAGNOSIS — M48.061 SPINAL STENOSIS OF LUMBAR REGION, UNSPECIFIED WHETHER NEUROGENIC CLAUDICATION PRESENT: ICD-10-CM

## 2024-06-11 DIAGNOSIS — M51.36 DDD (DEGENERATIVE DISC DISEASE), LUMBAR: ICD-10-CM

## 2024-06-11 DIAGNOSIS — M16.0 PRIMARY OSTEOARTHRITIS OF BOTH HIPS: ICD-10-CM

## 2024-06-11 DIAGNOSIS — M79.10 MYALGIA: ICD-10-CM

## 2024-06-11 DIAGNOSIS — R26.9 GAIT ABNORMALITY: ICD-10-CM

## 2024-06-11 PROCEDURE — 3074F SYST BP LT 130 MM HG: CPT | Performed by: STUDENT IN AN ORGANIZED HEALTH CARE EDUCATION/TRAINING PROGRAM

## 2024-06-11 PROCEDURE — RXMED WILLOW AMBULATORY MEDICATION CHARGE: Performed by: PHYSICIAN ASSISTANT

## 2024-06-11 PROCEDURE — 3079F DIAST BP 80-89 MM HG: CPT | Performed by: STUDENT IN AN ORGANIZED HEALTH CARE EDUCATION/TRAINING PROGRAM

## 2024-06-11 PROCEDURE — 1125F AMNT PAIN NOTED PAIN PRSNT: CPT | Performed by: STUDENT IN AN ORGANIZED HEALTH CARE EDUCATION/TRAINING PROGRAM

## 2024-06-11 PROCEDURE — 99213 OFFICE O/P EST LOW 20 MIN: CPT | Performed by: STUDENT IN AN ORGANIZED HEALTH CARE EDUCATION/TRAINING PROGRAM

## 2024-06-11 RX ORDER — AMOXICILLIN 875 MG/1
875 TABLET, COATED ORAL 2 TIMES DAILY
Qty: 14 TABLET | Refills: 0 | OUTPATIENT
Start: 2024-06-11

## 2024-06-11 RX ORDER — AMOXICILLIN 875 MG/1
875 TABLET, COATED ORAL 2 TIMES DAILY
Qty: 14 TABLET | Refills: 0 | Status: SHIPPED | OUTPATIENT
Start: 2024-06-11

## 2024-06-11 ASSESSMENT — PATIENT HEALTH QUESTIONNAIRE - PHQ9: CLINICAL INTERPRETATION OF PHQ2 SCORE: 0

## 2024-06-11 ASSESSMENT — PAIN SCALES - GENERAL: PAINLEVEL: 7=MODERATE-SEVERE PAIN

## 2024-06-11 NOTE — PROGRESS NOTES
Follow-up patient Note    Interventional Pain and Spine  Physiatry (Physical Medicine and Rehabilitation)     Patient Name: Rosario Blevins  : 1960  Date of service: 2024    Chief Complaint:   Chief Complaint   Patient presents with    Follow-Up     Myalgia       HISTORY FROM INITIAL VISIT (2024):  Rosario Blevins is a 63 y.o. female  at [employer] who presents today with pain radiating down her lateral right leg. Burning. Has had pain for years, and worsened sciatic pain since twisting the wrong way 1 year ago. Now worsening. Denies significant numbness or tingling.  She reports she has done a home exercise program diligently, but still with ongoing symptoms.  She reports some short-lived relief with massaging her right thigh at home.  She reports pain limited weakness of her right leg.  She reports she does not have pain at the time to go to physical therapy with her work schedule.     Pain right now is 5/10 on the numeric pain scale. Her pain at its best-worse level during the course of the day is typically 6-10/10, respectively.  Pain worsens with walking upstairs and walking downstairs and improves with sitting. Her pain significantly interferes with ADLs by the end of her workday.      The patient has completed a provider driven physical therapy program for this problem.     Patient has tried the following medications with varied success (current meds in bold):   Ibuprofen 800mg QAM  Gabapentin - SE tiredness  CBD oil     Therapeutic modalities and interventional therapies to date include:  -No targeted injections    HPI  Today's visit   Rosario Blevins ( 1960) is a female with Diagnoses of Myalgia, Lumbar spondylosis, DDD (degenerative disc disease), lumbar, Spinal stenosis of lumbar region, unspecified whether neurogenic claudication present, Low back pain with radiation, Gait abnormality, Primary osteoarthritis of both hips, and Hip joint stiffness,  bilateral were pertinent to this visit.    Presents today after 2024 trigger point injections with significant improvement in pain.  She reports that she no longer has the same pain and feels much better.  She has been able to decrease her use of ibuprofen.  Takes gabapentin as needed and ibuprofen as needed.  She does not feel the need for interventions at this time.    Pain severity 7/10 currently  Pt denies new numbness, tingling, or weakness.    Procedure history:  -2024 trigger point injections - significant improvement.    ROS:   Red Flags ROS:   Fever, Chills, Sweats: Denies  Involuntary Weight Loss: Denies  Bladder Incontinence: Denies  Bowel Incontinence: denies  Saddle Anesthesia: Denies    All other systems reviewed and negative.     PMHx:   Past Medical History:   Diagnosis Date    Diabetes     Hyperlipidemia     Hypertension        PSHx:   Past Surgical History:   Procedure Laterality Date    CATARACT EXTRACTION WITH IOL Bilateral 2020    HYSTERECTOMY, TOTAL ABDOMINAL      US-NEEDLE CORE BX-BREAST PANEL         Family Hx:   Family History   Problem Relation Age of Onset    Other Mother 47        Liver cancer    Cancer Mother         Liver CA    Other Sister         6 months    Other Brother         at birth    Heart Attack Maternal Uncle 40    Heart Attack Maternal Grandmother 50    Heart Attack Maternal Uncle 38    No Known Problems Father        Social Hx:  Social History     Socioeconomic History    Marital status: Single     Spouse name: Not on file    Number of children: Not on file    Years of education: Not on file    Highest education level: Not on file   Occupational History    Not on file   Tobacco Use    Smoking status: Former     Current packs/day: 0.00     Types: Cigarettes     Quit date: 1991     Years since quittin.3    Smokeless tobacco: Never   Vaping Use    Vaping status: Never Used   Substance and Sexual Activity    Alcohol use: No    Drug use: No    Sexual  activity: Not on file     Comment: ,17-year male , no children   Other Topics Concern    Not on file   Social History Narrative    Works at [employer]     Financial Resource Strain: Not on file   Food Insecurity: Not on file   Transportation Needs: Not on file   Physical Activity: Not on file   Stress: Not on file   Social Connections: Not on file   Intimate Partner Violence: Not on file   Housing Stability: Not on file       Allergies:  Allergies   Allergen Reactions    Codeine Unspecified     Unknown side effects.    Farxiga [Dapagliflozin] Diarrhea     Diarrhea and UTIs    Metformin Diarrhea     Diarrhea    Milk Products Food Allergy Vomiting       Medications: reviewed on epic.   Outpatient Medications Marked as Taking for the 6/11/24 encounter (Office Visit) with Nahed Briggs M.D.   Medication Sig Dispense Refill    amoxicillin (AMOXIL) 875 MG tablet Take 1 Tablet by mouth 2 times a day. 14 Tablet 0    fluticasone (FLONASE) 50 MCG/ACT nasal spray Administer 1 Spray into affected nostril(S) every day. 16 g 0    methylPREDNISolone (MEDROL DOSEPAK) 4 MG Tablet Therapy Pack Use as directed on the packaging label. 21 Tablet 0    gabapentin (NEURONTIN) 100 MG Cap Take 1 Capsule by mouth 3 times a day for 60 days. 90 Capsule 1    rosuvastatin (CRESTOR) 40 MG tablet Take 1 Tablet by mouth every day. 90 Tablet 3    amLODIPine (NORVASC) 5 MG Tab Take 1 Tablet by mouth every day. 90 Tablet 3    Semaglutide, 2 MG/DOSE, (OZEMPIC, 2 MG/DOSE,) 8 MG/3ML Solution Pen-injector Inject 2 MG under the skin every 7 days 3 mL 5    lisinopril (PRINIVIL) 20 MG Tab Take 1 Tablet by mouth every day. 90 Tablet 2    estradiol (ESTRACE) 1 MG Tab Take 1 Tablet by mouth every day. 90 Tablet 2    omeprazole (PRILOSEC) 40 MG delayed-release capsule Take 1 Capsule by mouth every day. 90 Capsule 2    Insulin Degludec (TRESIBA FLEXTOUCH) 200 UNIT/ML Solution Pen-injector Inject 40 Units  under the skin at bedtime. 90 day 27 mL 3        Current Outpatient Medications on File Prior to Visit   Medication Sig Dispense Refill    amoxicillin (AMOXIL) 875 MG tablet Take 1 Tablet by mouth 2 times a day. 14 Tablet 0    fluticasone (FLONASE) 50 MCG/ACT nasal spray Administer 1 Spray into affected nostril(S) every day. 16 g 0    methylPREDNISolone (MEDROL DOSEPAK) 4 MG Tablet Therapy Pack Use as directed on the packaging label. 21 Tablet 0    gabapentin (NEURONTIN) 100 MG Cap Take 1 Capsule by mouth 3 times a day for 60 days. 90 Capsule 1    rosuvastatin (CRESTOR) 40 MG tablet Take 1 Tablet by mouth every day. 90 Tablet 3    amLODIPine (NORVASC) 5 MG Tab Take 1 Tablet by mouth every day. 90 Tablet 3    Semaglutide, 2 MG/DOSE, (OZEMPIC, 2 MG/DOSE,) 8 MG/3ML Solution Pen-injector Inject 2 MG under the skin every 7 days 3 mL 5    lisinopril (PRINIVIL) 20 MG Tab Take 1 Tablet by mouth every day. 90 Tablet 2    estradiol (ESTRACE) 1 MG Tab Take 1 Tablet by mouth every day. 90 Tablet 2    omeprazole (PRILOSEC) 40 MG delayed-release capsule Take 1 Capsule by mouth every day. 90 Capsule 2    Insulin Degludec (TRESIBA FLEXTOUCH) 200 UNIT/ML Solution Pen-injector Inject 40 Units under the skin at bedtime. 90 day 27 mL 3     No current facility-administered medications on file prior to visit.         EXAMINATION     Physical Exam:   /85 (BP Location: Right arm, Patient Position: Sitting, BP Cuff Size: Adult)   Pulse (!) 105   Temp 36.4 °C (97.6 °F) (Temporal)   Wt 78.8 kg (173 lb 11.6 oz)   SpO2 97%     Constitutional:   Body Habitus: Body mass index is 33.93 kg/m².  Cooperation: Fully cooperates with exam  Appearance: Well-groomed, well-nourished.    Eyes: No scleral icterus to suggest severe liver disease, no proptosis to suggest severe hyperthyroidism    ENT -no obvious auditory deficits, no noticeable facial droop     Skin -no rashes or lesions noted     Respiratory-  breathing comfortably on room air, no  audible wheezing    Cardiovascular-distal extremities warm and well perfused.  No lower extremity edema is noted.     Gastrointestinal - no obvious abdominal masses, non-distended    Psychiatric- alert and oriented ×3. Normal affect.     Gait - normal gait, no use of ambulatory device, nonantalgic. Heel walking and toe walking intact.     Musculoskeletal and Neuro -         Thoracic/Lumbar Spine/Sacral Spine/Hips   Inspection: No evidence of atrophy in bilateral lower extremities throughout      There is limited active range of motion with lumbar extension     Facet loading maneuver negative bilaterally     Palpation: No tenderness to palpation elsewhere in the low back/hips including midline of lumbosacral spine, paraspinal muscles bilaterally, and lumbar facets bilaterally spanning approximately L1-L5 levels.     Lumbar spine /hip provocative exam maneuvers  Straight leg raise negative bilaterally  FADIR test negative bilaterally       SI joint tests    NORIS test negative bilaterally  Thigh thrust test negative bilaterally     Key points for the international standards for neurological classification of spinal cord injury (ISNCSCI) to light touch.   Dermatome R L   L2 2 2   L3 2 2   L4 2 2   L5 2 2   S1 2 2   S2 2 2         Motor Exam Lower Extremities  ? Myotome R L   Hip flexion L2 5 5   Knee extension L3 5 5   Ankle dorsiflexion L4 5 5   Toe extension L5 5 5   Ankle plantarflexion S1 5 5         Reflexes  ?   R L   Patella   2+ 2+   Achilles    2+ 2+      Clonus of the ankle negative bilaterally          MEDICAL DECISION MAKING    Medical records review: see under HPI section.     DATA    Labs: No new labs available for review since last visit.   Lab Results   Component Value Date/Time    SODIUM 135 03/27/2024 06:37 AM    POTASSIUM 4.4 03/27/2024 06:37 AM    CHLORIDE 100 03/27/2024 06:37 AM    CO2 24 03/27/2024 06:37 AM    ANION 11.0 03/27/2024 06:37 AM    GLUCOSE 161 (H) 03/27/2024 06:37 AM    BUN 16  "03/27/2024 06:37 AM    CREATININE 0.48 (L) 03/27/2024 06:37 AM    CALCIUM 9.3 03/27/2024 06:37 AM    ASTSGOT 32 03/27/2024 06:37 AM    ALTSGPT 39 03/27/2024 06:37 AM    TBILIRUBIN 0.5 03/27/2024 06:37 AM    ALBUMIN 4.2 03/27/2024 06:37 AM    TOTPROTEIN 7.2 03/27/2024 06:37 AM    GLOBULIN 3.0 03/27/2024 06:37 AM    AGRATIO 1.4 03/27/2024 06:37 AM       No results found for: \"PROTHROMBTM\", \"INR\"     Lab Results   Component Value Date/Time    WBC 6.6 05/12/2016 06:49 AM    RBC 5.17 05/12/2016 06:49 AM    HEMOGLOBIN 14.8 05/12/2016 06:49 AM    HEMATOCRIT 45.1 05/12/2016 06:49 AM    MCV 87.2 05/12/2016 06:49 AM    MCH 28.6 05/12/2016 06:49 AM    MCHC 32.8 (L) 05/12/2016 06:49 AM    MPV 11.0 05/12/2016 06:49 AM    NEUTSPOLYS 44.70 10/07/2015 07:29 AM    LYMPHOCYTES 45.70 (H) 10/07/2015 07:29 AM    MONOCYTES 7.20 10/07/2015 07:29 AM    EOSINOPHILS 1.60 10/07/2015 07:29 AM    BASOPHILS 0.60 10/07/2015 07:29 AM    HYPOCHROMIA 1+ 02/05/2014 07:33 AM        Lab Results   Component Value Date/Time    HBA1C 7.3 (A) 04/03/2024 07:26 AM        Imaging:   I personally reviewed following images, these are my reads  MRI lumbar spine 5/31/2023  At L2-3 there is mild bilateral neuroforaminal stenosis.  At L4-5 there is mild central canal stenosis and mild left-sided neuroforaminal stenosis.  L5-S1 there is no significant central canal stenosis or neuroforaminal stenosis. See formal radiology report for further details.        IMAGING radiology reads. I reviewed the following radiology reads                      Results for orders placed in visit on 05/31/23    MR-LUMBAR SPINE-W/O    Impression  Broad-based disc bulge at L2-3 with bilateral mild lateral recess narrowing and bilateral mild foraminal narrowing.    Broad-based disc bulge at L3-4 and L4-5 causes minimal canal narrowing.        Results for orders placed in visit on 05/31/23    MR-LUMBAR SPINE-W/O    Impression  Broad-based disc bulge at L2-3 with bilateral mild lateral " recess narrowing and bilateral mild foraminal narrowing.    Broad-based disc bulge at L3-4 and L4-5 causes minimal canal narrowing.                                          Results for orders placed in visit on 13    DX-CHEST-2 VIEWS    Impression  No active disease.            INTERPRETING LOCATION: [address] [place] [address], [place], [id number]                      Results for orders placed during the hospital encounter of 23    DX-LUMBAR SPINE-2 OR 3 VIEWS    Impression  No acute osseous abnormality.  No malalignment.  Moderate degenerative change of the lumbar spine, most at L2-3.                  Results for orders placed during the hospital encounter of 17    DX-WRIST-COMPLETE 3+ LEFT    Impression  1.  Negative for left wrist fracture or malalignment    2.  Mild osteoarthritis         Diagnosis  Visit Diagnoses     ICD-10-CM   1. Myalgia  M79.10   2. Lumbar spondylosis  M47.816   3. DDD (degenerative disc disease), lumbar  M51.36   4. Spinal stenosis of lumbar region, unspecified whether neurogenic claudication present  M48.061   5. Low back pain with radiation  M54.50   6. Gait abnormality  R26.9   7. Primary osteoarthritis of both hips  M16.0   8. Hip joint stiffness, bilateral  M25.651    M25.652         ASSESSMENT AND PLAN:  Rosario Blevins (: 1960) is a female presenting with pain radiating down her right leg in the L5 distribution that sounds neuropathic in nature, however has a negative piriformis test on exam and no signs of corresponding nerve impingement on her MRI lumbar spine.  She also has tenderness to palpation at her right TFL muscle that can reproduce her pain.  At this time, her pain appears to be more myalgia in nature.      Rosario was seen today for follow-up.    Diagnoses and all orders for this visit:    Myalgia    Lumbar spondylosis    DDD (degenerative disc disease), lumbar    Spinal stenosis of lumbar region, unspecified whether neurogenic claudication  present    Low back pain with radiation    Gait abnormality    Primary osteoarthritis of both hips    Hip joint stiffness, bilateral          PLAN  Physical Therapy: I have discussed possible physical therapy.  The patient reports that she does not have time to pursue physical therapy due to her work schedule.  Continue home exercise program     Diagnostic workup: Personally reviewed at today's visit: MRI lumbar spine 5/31/2023     Medications:   -Okay to continue ibuprofen and gabapentin as per PCP.      Interventions:   - Trigger point injections under ultrasound guidance as needed given resolution of her pain with this injection in the past    Follow-up: As needed    No orders of the defined types were placed in this encounter.      Nahed Briggs MD  Interventional Pain and Spine  Physical Medicine and Rehabilitation  [facility]      The above note documents my personal evaluation of this patient. In addition, I have reviewed and confirmed with the patient and MA the supportive information documented in today's Patient Health Questionnaire and Office Note.     Please note that this dictation was created using voice recognition software. I have made every reasonable attempt to correct obvious errors, but I expect that there are errors of grammar and possibly content that I did not discover before finalizing the note.

## 2024-07-02 DIAGNOSIS — G89.29 CHRONIC RIGHT-SIDED LOW BACK PAIN WITH RIGHT-SIDED SCIATICA: ICD-10-CM

## 2024-07-02 DIAGNOSIS — M54.41 CHRONIC RIGHT-SIDED LOW BACK PAIN WITH RIGHT-SIDED SCIATICA: ICD-10-CM

## 2024-07-02 PROCEDURE — RXMED WILLOW AMBULATORY MEDICATION CHARGE: Performed by: PHYSICIAN ASSISTANT

## 2024-07-02 PROCEDURE — RXMED WILLOW AMBULATORY MEDICATION CHARGE: Performed by: INTERNAL MEDICINE

## 2024-07-03 ENCOUNTER — PHARMACY VISIT (OUTPATIENT)
Dept: PHARMACY | Facility: MEDICAL CENTER | Age: 64
End: 2024-07-03
Payer: COMMERCIAL

## 2024-07-03 PROCEDURE — RXMED WILLOW AMBULATORY MEDICATION CHARGE: Performed by: FAMILY MEDICINE

## 2024-07-03 RX ORDER — GABAPENTIN 100 MG/1
100 CAPSULE ORAL 3 TIMES DAILY
Qty: 90 CAPSULE | Refills: 0 | Status: SHIPPED | OUTPATIENT
Start: 2024-07-03

## 2024-07-24 ENCOUNTER — OFFICE VISIT (OUTPATIENT)
Dept: PHYSICAL MEDICINE AND REHAB | Facility: MEDICAL CENTER | Age: 64
End: 2024-07-24
Payer: COMMERCIAL

## 2024-07-24 VITALS
HEART RATE: 89 BPM | BODY MASS INDEX: 34.23 KG/M2 | TEMPERATURE: 97.6 F | SYSTOLIC BLOOD PRESSURE: 148 MMHG | WEIGHT: 175.27 LBS | DIASTOLIC BLOOD PRESSURE: 75 MMHG | OXYGEN SATURATION: 100 %

## 2024-07-24 DIAGNOSIS — M54.50 LOW BACK PAIN WITH RADIATION: ICD-10-CM

## 2024-07-24 DIAGNOSIS — M79.10 MYALGIA: ICD-10-CM

## 2024-07-24 DIAGNOSIS — M51.36 DDD (DEGENERATIVE DISC DISEASE), LUMBAR: ICD-10-CM

## 2024-07-24 DIAGNOSIS — M48.061 SPINAL STENOSIS OF LUMBAR REGION, UNSPECIFIED WHETHER NEUROGENIC CLAUDICATION PRESENT: ICD-10-CM

## 2024-07-24 DIAGNOSIS — M47.816 LUMBAR SPONDYLOSIS: ICD-10-CM

## 2024-07-24 PROCEDURE — 1125F AMNT PAIN NOTED PAIN PRSNT: CPT | Performed by: STUDENT IN AN ORGANIZED HEALTH CARE EDUCATION/TRAINING PROGRAM

## 2024-07-24 PROCEDURE — 76942 ECHO GUIDE FOR BIOPSY: CPT | Performed by: STUDENT IN AN ORGANIZED HEALTH CARE EDUCATION/TRAINING PROGRAM

## 2024-07-24 PROCEDURE — 3078F DIAST BP <80 MM HG: CPT | Performed by: STUDENT IN AN ORGANIZED HEALTH CARE EDUCATION/TRAINING PROGRAM

## 2024-07-24 PROCEDURE — 3077F SYST BP >= 140 MM HG: CPT | Performed by: STUDENT IN AN ORGANIZED HEALTH CARE EDUCATION/TRAINING PROGRAM

## 2024-07-24 PROCEDURE — 99214 OFFICE O/P EST MOD 30 MIN: CPT | Mod: 25 | Performed by: STUDENT IN AN ORGANIZED HEALTH CARE EDUCATION/TRAINING PROGRAM

## 2024-07-24 PROCEDURE — 20553 NJX 1/MLT TRIGGER POINTS 3/>: CPT | Performed by: STUDENT IN AN ORGANIZED HEALTH CARE EDUCATION/TRAINING PROGRAM

## 2024-07-24 ASSESSMENT — PAIN SCALES - GENERAL: PAINLEVEL: 8=MODERATE-SEVERE PAIN

## 2024-07-24 ASSESSMENT — PATIENT HEALTH QUESTIONNAIRE - PHQ9: CLINICAL INTERPRETATION OF PHQ2 SCORE: 0

## 2024-07-25 DIAGNOSIS — Z79.4 CONTROLLED TYPE 2 DIABETES MELLITUS WITHOUT COMPLICATION, WITH LONG-TERM CURRENT USE OF INSULIN (HCC): ICD-10-CM

## 2024-07-25 DIAGNOSIS — E11.9 CONTROLLED TYPE 2 DIABETES MELLITUS WITHOUT COMPLICATION, WITH LONG-TERM CURRENT USE OF INSULIN (HCC): ICD-10-CM

## 2024-07-25 RX ORDER — SEMAGLUTIDE 2.68 MG/ML
INJECTION, SOLUTION SUBCUTANEOUS
Qty: 3 ML | Refills: 5 | Status: SHIPPED | OUTPATIENT
Start: 2024-07-25

## 2024-07-29 DIAGNOSIS — Z79.890 HORMONE REPLACEMENT THERAPY (HRT): ICD-10-CM

## 2024-07-29 DIAGNOSIS — K21.9 GASTROESOPHAGEAL REFLUX DISEASE WITHOUT ESOPHAGITIS: ICD-10-CM

## 2024-07-29 PROCEDURE — RXMED WILLOW AMBULATORY MEDICATION CHARGE: Performed by: PHYSICIAN ASSISTANT

## 2024-07-29 PROCEDURE — RXMED WILLOW AMBULATORY MEDICATION CHARGE: Performed by: INTERNAL MEDICINE

## 2024-07-29 RX ORDER — OMEPRAZOLE 40 MG/1
40 CAPSULE, DELAYED RELEASE ORAL DAILY
Qty: 90 CAPSULE | Refills: 2 | Status: SHIPPED | OUTPATIENT
Start: 2024-07-29

## 2024-07-29 RX ORDER — ESTRADIOL 1 MG/1
1 TABLET ORAL DAILY
Qty: 90 TABLET | Refills: 2 | Status: SHIPPED | OUTPATIENT
Start: 2024-07-29

## 2024-07-30 ENCOUNTER — PHARMACY VISIT (OUTPATIENT)
Dept: PHARMACY | Facility: MEDICAL CENTER | Age: 64
End: 2024-07-30
Payer: COMMERCIAL

## 2024-08-01 ENCOUNTER — PHARMACY VISIT (OUTPATIENT)
Dept: PHARMACY | Facility: MEDICAL CENTER | Age: 64
End: 2024-08-01
Payer: COMMERCIAL

## 2024-08-01 PROCEDURE — RXMED WILLOW AMBULATORY MEDICATION CHARGE: Performed by: PHYSICIAN ASSISTANT

## 2024-08-20 NOTE — PROGRESS NOTES
Verbal consent was acquired by the patient to use [employer] listening note generation during this visit Yes     Follow-up patient Note    Interventional Pain and Spine  Physiatry (Physical Medicine and Rehabilitation)     Patient Name: Rosario Blevins  : 1960  Date of service: 2024    Chief Complaint:   Chief Complaint   Patient presents with    Follow-Up     Myalgia       HISTORY FROM INITIAL VISIT (2024):  Rosario Blevins is a 63 y.o. female  at [employer] who presents today with pain radiating down her lateral right leg. Burning. Has had pain for years, and worsened sciatic pain since twisting the wrong way 1 year ago. Now worsening. Denies significant numbness or tingling.  She reports she has done a home exercise program diligently, but still with ongoing symptoms.  She reports some short-lived relief with massaging her right thigh at home.  She reports pain limited weakness of her right leg.  She reports she does not have pain at the time to go to physical therapy with her work schedule.     Pain right now is 5/10 on the numeric pain scale. Her pain at its best-worse level during the course of the day is typically 6-10/10, respectively.  Pain worsens with walking upstairs and walking downstairs and improves with sitting. Her pain significantly interferes with ADLs by the end of her workday.      The patient has completed a provider driven physical therapy program for this problem.     Patient has tried the following medications with varied success (current meds in bold):   Ibuprofen 800mg QAM  Gabapentin - SE tiredness  CBD oil     Therapeutic modalities and interventional therapies to date include:  -No targeted injections    HPI  Today's visit   Rosario Blevins ( 1960) is a female with Diagnoses of Risk for falls, Spinal stenosis of lumbar region, unspecified whether neurogenic claudication present, Myalgia, Lumbar spondylosis, Low back pain with  radiation, DDD (degenerative disc disease), lumbar, Primary osteoarthritis of both hips, Hip joint stiffness, bilateral, Weakness of both lower extremities, Gait abnormality, and Right lumbar radiculitis were pertinent to this visit.    History of Present Illness  The patient is a 63-year-old female who presents for follow-up after trigger point injections.    She reports that the trigger point injections provided temporary relief, but her pain has since worsened. Over the past week, she has experienced more back pain and a burning sensation in her right leg mainly in the L5 distribution. There was a day when she was unable to walk due to severe body pain. She is not experiencing any numbness or tingling. She finds relief from her back pain for about 8 hours when she applies a pain patch.    Her current medication regimen includes Advil every 4 hours, with an additional dose after work, and gabapentin 100 mg in the morning and 200 mg in the afternoon. She notes that if she does not take her Advil by 11:00 AM, her pain becomes unbearable for the rest of the day. She mentions that she has never taken meloxicam before.    She is concerned about the potential impact of epidural injections on her blood sugar levels, which are currently below 100. She tries to keep her blood sugar levels below 120 and maintains a careful diet. She also monitors her blood pressure at home, which was slightly elevated this morning at around 140, likely due to her pain.      Pain currently rated 7/10 on NRS    Procedure history:  -5/14/2024 trigger point injections - significant improvement.  - 07/24/24 trigger point injections  - no significant improvement.    ROS:   Red Flags ROS:   Fever, Chills, Sweats: Denies  Involuntary Weight Loss: Denies  Bladder Incontinence: Denies  Bowel Incontinence: denies  Saddle Anesthesia: Denies    All other systems reviewed and negative.     PMHx:   Past Medical History:   Diagnosis Date    Diabetes      Hyperlipidemia     Hypertension        PSHx:   Past Surgical History:   Procedure Laterality Date    CATARACT EXTRACTION WITH IOL Bilateral 2020    HYSTERECTOMY, TOTAL ABDOMINAL      US-NEEDLE CORE BX-BREAST PANEL         Family Hx:   Family History   Problem Relation Age of Onset    Other Mother 47        Liver cancer    Cancer Mother         Liver CA    Other Sister         6 months    Other Brother         at birth    Heart Attack Maternal Uncle 40    Heart Attack Maternal Grandmother 50    Heart Attack Maternal Uncle 38    No Known Problems Father        Social Hx:  Social History     Socioeconomic History    Marital status: Single     Spouse name: Not on file    Number of children: Not on file    Years of education: Not on file    Highest education level: Not on file   Occupational History    Not on file   Tobacco Use    Smoking status: Former     Current packs/day: 0.00     Types: Cigarettes     Quit date: 1991     Years since quittin.5    Smokeless tobacco: Never   Vaping Use    Vaping status: Never Used   Substance and Sexual Activity    Alcohol use: No    Drug use: No    Sexual activity: Not on file     Comment: ,17-year male , no children   Other Topics Concern    Not on file   Social History Narrative    Works at [employer]     Financial Resource Strain: Not on file   Food Insecurity: Not on file   Transportation Needs: Not on file   Physical Activity: Not on file   Stress: Not on file   Social Connections: Not on file   Intimate Partner Violence: Not on file   Housing Stability: Not on file       Allergies:  Allergies   Allergen Reactions    Codeine Unspecified     Unknown side effects.    Farxiga [Dapagliflozin] Diarrhea     Diarrhea and UTIs    Metformin Diarrhea     Diarrhea    Milk Products Food Allergy Vomiting       Medications: reviewed on epic.   Outpatient Medications Marked as Taking for the 24 encounter (Office Visit) with  Nahed Briggs M.D.   Medication Sig Dispense Refill    meloxicam (MOBIC) 7.5 MG Tab Take 1 Tablet by mouth 2 times a day as needed for Inflammation, Moderate Pain or Severe Pain. 60 Tablet 2    estradiol (ESTRACE) 1 MG Tab Take 1 Tablet by mouth every day. 90 Tablet 2    omeprazole (PRILOSEC) 40 MG delayed-release capsule Take 1 Capsule by mouth every day. 90 Capsule 2    Semaglutide, 2 MG/DOSE, (OZEMPIC, 2 MG/DOSE,) 8 MG/3ML Solution Pen-injector Inject 2 MG under the skin every 7 days 3 mL 5    gabapentin (NEURONTIN) 100 MG Cap Take 1 Capsule by mouth 3 times a day. 90 Capsule 0    amoxicillin (AMOXIL) 875 MG tablet Take 1 Tablet by mouth 2 times a day. 14 Tablet 0    fluticasone (FLONASE) 50 MCG/ACT nasal spray Administer 1 Spray into affected nostril(S) every day. 16 g 0    methylPREDNISolone (MEDROL DOSEPAK) 4 MG Tablet Therapy Pack Use as directed on the packaging label. 21 Tablet 0    rosuvastatin (CRESTOR) 40 MG tablet Take 1 Tablet by mouth every day. 90 Tablet 3    amLODIPine (NORVASC) 5 MG Tab Take 1 Tablet by mouth every day. 90 Tablet 3    lisinopril (PRINIVIL) 20 MG Tab Take 1 Tablet by mouth every day. 90 Tablet 2    Insulin Degludec (TRESIBA FLEXTOUCH) 200 UNIT/ML Solution Pen-injector Inject 40 Units under the skin at bedtime. 90 day 27 mL 3        Current Outpatient Medications on File Prior to Visit   Medication Sig Dispense Refill    estradiol (ESTRACE) 1 MG Tab Take 1 Tablet by mouth every day. 90 Tablet 2    omeprazole (PRILOSEC) 40 MG delayed-release capsule Take 1 Capsule by mouth every day. 90 Capsule 2    Semaglutide, 2 MG/DOSE, (OZEMPIC, 2 MG/DOSE,) 8 MG/3ML Solution Pen-injector Inject 2 MG under the skin every 7 days 3 mL 5    gabapentin (NEURONTIN) 100 MG Cap Take 1 Capsule by mouth 3 times a day. 90 Capsule 0    amoxicillin (AMOXIL) 875 MG tablet Take 1 Tablet by mouth 2 times a day. 14 Tablet 0    fluticasone (FLONASE) 50 MCG/ACT nasal spray Administer 1 Spray into affected  "nostril(S) every day. 16 g 0    methylPREDNISolone (MEDROL DOSEPAK) 4 MG Tablet Therapy Pack Use as directed on the packaging label. 21 Tablet 0    rosuvastatin (CRESTOR) 40 MG tablet Take 1 Tablet by mouth every day. 90 Tablet 3    amLODIPine (NORVASC) 5 MG Tab Take 1 Tablet by mouth every day. 90 Tablet 3    lisinopril (PRINIVIL) 20 MG Tab Take 1 Tablet by mouth every day. 90 Tablet 2    Insulin Degludec (TRESIBA FLEXTOUCH) 200 UNIT/ML Solution Pen-injector Inject 40 Units under the skin at bedtime. 90 day 27 mL 3     No current facility-administered medications on file prior to visit.         EXAMINATION     Physical Exam:   BP (!) 140/86 (BP Location: Right arm, Patient Position: Sitting, BP Cuff Size: Adult)   Pulse 77   Temp 36.6 °C (97.8 °F) (Temporal)   Ht 1.499 m (4' 11\")   Wt 79.2 kg (174 lb 9.7 oz)   SpO2 100%     Constitutional:   Body Habitus: Body mass index is 35.27 kg/m².  Cooperation: Fully cooperates with exam  Appearance: Well-groomed, well-nourished.    Eyes: No scleral icterus to suggest severe liver disease, no proptosis to suggest severe hyperthyroidism    ENT -no obvious auditory deficits, no noticeable facial droop     Skin -no rashes or lesions noted     Respiratory-  breathing comfortably on room air, no audible wheezing    Cardiovascular-distal extremities warm and well perfused.  No lower extremity edema is noted.     Gastrointestinal - no obvious abdominal masses, non-distended    Psychiatric- alert and oriented ×3. Normal affect.     Gait - normal gait, no use of ambulatory device, nonantalgic. Heel walking and toe walking intact.     Musculoskeletal and Neuro -         Thoracic/Lumbar Spine/Sacral Spine/Hips   Inspection: No evidence of atrophy in bilateral lower extremities throughout      There is limited active range of motion with lumbar extension     Facet loading maneuver negative bilaterally     Palpation: Tenderness to palpation at the right glute, right TFL, right " "gastrocnemius.  No tenderness to palpation elsewhere in the low back/hips including midline of lumbosacral spine, paraspinal muscles bilaterally, and lumbar facets bilaterally spanning approximately L1-L5 levels.     Lumbar spine /hip provocative exam maneuvers  Straight leg raise positive bilaterally  FADIR test negative bilaterally       SI joint tests    NORIS test negative bilaterally  Thigh thrust test negative bilaterally     Key points for the international standards for neurological classification of spinal cord injury (ISNCSCI) to light touch.   Dermatome R L   L2 2 2   L3 2 2   L4 2 2   L5 1 1   S1 2 2   S2 2 2         Motor Exam Lower Extremities  ? Myotome R L   Hip flexion L2 5 5   Knee extension L3 5 5   Ankle dorsiflexion L4 5 5   Toe extension L5 5 5   Ankle plantarflexion S1 5 5       Previous exam  Reflexes  ?   R L   Patella   2+ 2+   Achilles    2+ 2+      Clonus of the ankle negative bilaterally          MEDICAL DECISION MAKING    Medical records review: see under HPI section.     DATA    Labs: No new labs available for review since last visit.   Lab Results   Component Value Date/Time    SODIUM 135 03/27/2024 06:37 AM    POTASSIUM 4.4 03/27/2024 06:37 AM    CHLORIDE 100 03/27/2024 06:37 AM    CO2 24 03/27/2024 06:37 AM    ANION 11.0 03/27/2024 06:37 AM    GLUCOSE 161 (H) 03/27/2024 06:37 AM    BUN 16 03/27/2024 06:37 AM    CREATININE 0.48 (L) 03/27/2024 06:37 AM    CALCIUM 9.3 03/27/2024 06:37 AM    ASTSGOT 32 03/27/2024 06:37 AM    ALTSGPT 39 03/27/2024 06:37 AM    TBILIRUBIN 0.5 03/27/2024 06:37 AM    ALBUMIN 4.2 03/27/2024 06:37 AM    TOTPROTEIN 7.2 03/27/2024 06:37 AM    GLOBULIN 3.0 03/27/2024 06:37 AM    AGRATIO 1.4 03/27/2024 06:37 AM       No results found for: \"PROTHROMBTM\", \"INR\"     Lab Results   Component Value Date/Time    WBC 6.6 05/12/2016 06:49 AM    RBC 5.17 05/12/2016 06:49 AM    HEMOGLOBIN 14.8 05/12/2016 06:49 AM    HEMATOCRIT 45.1 05/12/2016 06:49 AM    MCV 87.2 05/12/2016 " 06:49 AM    MCH 28.6 05/12/2016 06:49 AM    MCHC 32.8 (L) 05/12/2016 06:49 AM    MPV 11.0 05/12/2016 06:49 AM    NEUTSPOLYS 44.70 10/07/2015 07:29 AM    LYMPHOCYTES 45.70 (H) 10/07/2015 07:29 AM    MONOCYTES 7.20 10/07/2015 07:29 AM    EOSINOPHILS 1.60 10/07/2015 07:29 AM    BASOPHILS 0.60 10/07/2015 07:29 AM    HYPOCHROMIA 1+ 02/05/2014 07:33 AM        Lab Results   Component Value Date/Time    HBA1C 7.3 (A) 04/03/2024 07:26 AM        Imaging:   I personally reviewed following images, these are my reads  MRI lumbar spine 5/31/2023  At L2-3 there is mild bilateral neuroforaminal stenosis.  At L4-5 there is mild central canal stenosis and mild left-sided neuroforaminal stenosis.  L5-S1 there is no significant central canal stenosis or neuroforaminal stenosis. See formal radiology report for further details.        IMAGING radiology reads. I reviewed the following radiology reads                      Results for orders placed in visit on 05/31/23    MR-LUMBAR SPINE-W/O    Impression  Broad-based disc bulge at L2-3 with bilateral mild lateral recess narrowing and bilateral mild foraminal narrowing.    Broad-based disc bulge at L3-4 and L4-5 causes minimal canal narrowing.        Results for orders placed in visit on 05/31/23    MR-LUMBAR SPINE-W/O    Impression  Broad-based disc bulge at L2-3 with bilateral mild lateral recess narrowing and bilateral mild foraminal narrowing.    Broad-based disc bulge at L3-4 and L4-5 causes minimal canal narrowing.                                          Results for orders placed in visit on 06/20/13    DX-CHEST-2 VIEWS    Impression  No active disease.            INTERPRETING LOCATION: [address] [place] [address], [facility], [address]                      Results for orders placed during the hospital encounter of 05/11/23    DX-LUMBAR SPINE-2 OR 3 VIEWS    Impression  No acute osseous abnormality.  No malalignment.  Moderate degenerative change of the lumbar spine, most at L2-3.                   Results for orders placed during the hospital encounter of 17    DX-WRIST-COMPLETE 3+ LEFT    Impression  1.  Negative for left wrist fracture or malalignment    2.  Mild osteoarthritis         Diagnosis  Visit Diagnoses     ICD-10-CM   1. Risk for falls  Z91.81   2. Spinal stenosis of lumbar region, unspecified whether neurogenic claudication present  M48.061   3. Myalgia  M79.10   4. Lumbar spondylosis  M47.816   5. Low back pain with radiation  M54.50   6. DDD (degenerative disc disease), lumbar  M51.36   7. Primary osteoarthritis of both hips  M16.0   8. Hip joint stiffness, bilateral  M25.651    M25.652   9. Weakness of both lower extremities  R29.898   10. Gait abnormality  R26.9   11. Right lumbar radiculitis  M54.16             ASSESSMENT AND PLAN:  Rosario Blevins (: 1960) is a female presenting with pain radiating down her right leg in the L5 distribution that sounds neuropathic in nature.  Positive straight leg raise test reproducing her pain.  Negative piriformis test.  No sustained improvement after trigger point injections     Rosario was seen today for follow-up.    Diagnoses and all orders for this visit:    Risk for falls  -     Patient identified as fall risk.  Appropriate orders and counseling given.    Spinal stenosis of lumbar region, unspecified whether neurogenic claudication present    Myalgia    Lumbar spondylosis    Low back pain with radiation    DDD (degenerative disc disease), lumbar    Primary osteoarthritis of both hips    Hip joint stiffness, bilateral    Weakness of both lower extremities    Gait abnormality    Right lumbar radiculitis  -     Referral to Pain Clinic    Other orders  -     meloxicam (MOBIC) 7.5 MG Tab; Take 1 Tablet by mouth 2 times a day as needed for Inflammation, Moderate Pain or Severe Pain.            PLAN  Physical Therapy: I have discussed possible physical therapy.  The patient reports that she does not have time to pursue physical therapy  due to her work schedule.  Continue home exercise program     Diagnostic workup: Again personally reviewed at today's visit: MRI lumbar spine 5/31/2023     Medications:   A prescription for meloxicam 7.5 mg, to be taken once or twice daily as needed, has been provided. She is advised to discontinue ibuprofen and continue her gabapentin regimen while taking meloxicam. The potential side effects of meloxicam, including a slight increase in blood pressure, have been discussed. She is encouraged to monitor her blood pressure at home while on this medication. If she experiences an improvement in her symptoms with meloxicam, it will replace ibuprofen in her treatment plan. If meloxicam does not provide better relief than ibuprofen, she can choose which medication to continue.     Interventions:   -Discussed right L5-S1 transforaminal epidural steroid injection.  She would like to obtain a cost estimate prior to scheduling this injection.  She is also concerned about the effects of steroids on her blood sugar  -S/p trigger point injections under ultrasound guidance without significant relief    Follow-up: 1 month    Orders Placed This Encounter    Referral to Pain Clinic    Patient identified as fall risk.  Appropriate orders and counseling given.    meloxicam (MOBIC) 7.5 MG Tab       Nahed Briggs MD  Interventional Pain and Spine  Physical Medicine and Rehabilitation  [facility]      The above note documents my personal evaluation of this patient. In addition, I have reviewed and confirmed with the patient and MA the supportive information documented in today's Patient Health Questionnaire and Office Note.     Please note that this dictation was created using voice recognition software. I have made every reasonable attempt to correct obvious errors, but I expect that there are errors of grammar and possibly content that I did not discover before finalizing the note.

## 2024-08-21 ENCOUNTER — PHARMACY VISIT (OUTPATIENT)
Dept: PHARMACY | Facility: MEDICAL CENTER | Age: 64
End: 2024-08-21
Payer: COMMERCIAL

## 2024-08-21 ENCOUNTER — OFFICE VISIT (OUTPATIENT)
Dept: PHYSICAL MEDICINE AND REHAB | Facility: MEDICAL CENTER | Age: 64
End: 2024-08-21
Payer: COMMERCIAL

## 2024-08-21 VITALS
HEIGHT: 59 IN | TEMPERATURE: 97.8 F | SYSTOLIC BLOOD PRESSURE: 140 MMHG | BODY MASS INDEX: 35.2 KG/M2 | DIASTOLIC BLOOD PRESSURE: 86 MMHG | HEART RATE: 77 BPM | WEIGHT: 174.6 LBS | OXYGEN SATURATION: 100 %

## 2024-08-21 DIAGNOSIS — M54.50 LOW BACK PAIN WITH RADIATION: ICD-10-CM

## 2024-08-21 DIAGNOSIS — R29.898 WEAKNESS OF BOTH LOWER EXTREMITIES: ICD-10-CM

## 2024-08-21 DIAGNOSIS — M25.652 HIP JOINT STIFFNESS, BILATERAL: ICD-10-CM

## 2024-08-21 DIAGNOSIS — M51.36 DDD (DEGENERATIVE DISC DISEASE), LUMBAR: ICD-10-CM

## 2024-08-21 DIAGNOSIS — M48.061 SPINAL STENOSIS OF LUMBAR REGION, UNSPECIFIED WHETHER NEUROGENIC CLAUDICATION PRESENT: ICD-10-CM

## 2024-08-21 DIAGNOSIS — M79.10 MYALGIA: ICD-10-CM

## 2024-08-21 DIAGNOSIS — Z91.81 RISK FOR FALLS: ICD-10-CM

## 2024-08-21 DIAGNOSIS — M47.816 LUMBAR SPONDYLOSIS: ICD-10-CM

## 2024-08-21 DIAGNOSIS — M16.0 PRIMARY OSTEOARTHRITIS OF BOTH HIPS: ICD-10-CM

## 2024-08-21 DIAGNOSIS — M25.651 HIP JOINT STIFFNESS, BILATERAL: ICD-10-CM

## 2024-08-21 DIAGNOSIS — M54.16 RIGHT LUMBAR RADICULITIS: ICD-10-CM

## 2024-08-21 DIAGNOSIS — R26.9 GAIT ABNORMALITY: ICD-10-CM

## 2024-08-21 PROCEDURE — RXMED WILLOW AMBULATORY MEDICATION CHARGE: Performed by: STUDENT IN AN ORGANIZED HEALTH CARE EDUCATION/TRAINING PROGRAM

## 2024-08-21 PROCEDURE — 1125F AMNT PAIN NOTED PAIN PRSNT: CPT | Performed by: STUDENT IN AN ORGANIZED HEALTH CARE EDUCATION/TRAINING PROGRAM

## 2024-08-21 PROCEDURE — 99214 OFFICE O/P EST MOD 30 MIN: CPT | Performed by: STUDENT IN AN ORGANIZED HEALTH CARE EDUCATION/TRAINING PROGRAM

## 2024-08-21 PROCEDURE — 3077F SYST BP >= 140 MM HG: CPT | Performed by: STUDENT IN AN ORGANIZED HEALTH CARE EDUCATION/TRAINING PROGRAM

## 2024-08-21 PROCEDURE — 3079F DIAST BP 80-89 MM HG: CPT | Performed by: STUDENT IN AN ORGANIZED HEALTH CARE EDUCATION/TRAINING PROGRAM

## 2024-08-21 PROCEDURE — G2211 COMPLEX E/M VISIT ADD ON: HCPCS | Performed by: STUDENT IN AN ORGANIZED HEALTH CARE EDUCATION/TRAINING PROGRAM

## 2024-08-21 RX ORDER — MELOXICAM 7.5 MG/1
7.5 TABLET ORAL 2 TIMES DAILY PRN
Qty: 60 TABLET | Refills: 2 | Status: SHIPPED | OUTPATIENT
Start: 2024-08-21

## 2024-08-21 ASSESSMENT — PAIN SCALES - GENERAL: PAINLEVEL: 7=MODERATE-SEVERE PAIN

## 2024-08-21 ASSESSMENT — PATIENT HEALTH QUESTIONNAIRE - PHQ9: CLINICAL INTERPRETATION OF PHQ2 SCORE: 0

## 2024-08-26 ENCOUNTER — PHARMACY VISIT (OUTPATIENT)
Dept: PHARMACY | Facility: MEDICAL CENTER | Age: 64
End: 2024-08-26
Payer: COMMERCIAL

## 2024-08-26 DIAGNOSIS — I10 ESSENTIAL HYPERTENSION: ICD-10-CM

## 2024-08-26 PROCEDURE — RXMED WILLOW AMBULATORY MEDICATION CHARGE: Performed by: PHYSICIAN ASSISTANT

## 2024-08-26 PROCEDURE — RXMED WILLOW AMBULATORY MEDICATION CHARGE: Performed by: INTERNAL MEDICINE

## 2024-08-26 RX ORDER — LISINOPRIL 20 MG/1
20 TABLET ORAL DAILY
Qty: 90 TABLET | Refills: 2 | Status: SHIPPED | OUTPATIENT
Start: 2024-08-26

## 2024-08-27 ENCOUNTER — PHARMACY VISIT (OUTPATIENT)
Dept: PHARMACY | Facility: MEDICAL CENTER | Age: 64
End: 2024-08-27
Payer: COMMERCIAL

## 2024-09-19 ENCOUNTER — HOSPITAL ENCOUNTER (OUTPATIENT)
Dept: RADIOLOGY | Facility: MEDICAL CENTER | Age: 64
End: 2024-09-19
Attending: PHYSICIAN ASSISTANT
Payer: COMMERCIAL

## 2024-09-19 DIAGNOSIS — Z12.31 VISIT FOR SCREENING MAMMOGRAM: ICD-10-CM

## 2024-09-19 PROCEDURE — 77067 SCR MAMMO BI INCL CAD: CPT

## 2024-09-24 ENCOUNTER — PHARMACY VISIT (OUTPATIENT)
Dept: PHARMACY | Facility: MEDICAL CENTER | Age: 64
End: 2024-09-24
Payer: COMMERCIAL

## 2024-09-24 ENCOUNTER — HOSPITAL ENCOUNTER (OUTPATIENT)
Dept: LAB | Facility: MEDICAL CENTER | Age: 64
End: 2024-09-24
Attending: PHYSICIAN ASSISTANT
Payer: COMMERCIAL

## 2024-09-24 DIAGNOSIS — Z79.4 CONTROLLED TYPE 2 DIABETES MELLITUS WITHOUT COMPLICATION, WITH LONG-TERM CURRENT USE OF INSULIN (HCC): ICD-10-CM

## 2024-09-24 DIAGNOSIS — E11.9 CONTROLLED TYPE 2 DIABETES MELLITUS WITHOUT COMPLICATION, WITH LONG-TERM CURRENT USE OF INSULIN (HCC): ICD-10-CM

## 2024-09-24 LAB
ALBUMIN SERPL BCP-MCNC: 4.3 G/DL (ref 3.2–4.9)
ALBUMIN/GLOB SERPL: 1.3 G/DL
ALP SERPL-CCNC: 50 U/L (ref 30–99)
ALT SERPL-CCNC: 40 U/L (ref 2–50)
ANION GAP SERPL CALC-SCNC: 14 MMOL/L (ref 7–16)
AST SERPL-CCNC: 37 U/L (ref 12–45)
BASOPHILS # BLD AUTO: 0.4 % (ref 0–1.8)
BASOPHILS # BLD: 0.03 K/UL (ref 0–0.12)
BILIRUB SERPL-MCNC: 0.6 MG/DL (ref 0.1–1.5)
BUN SERPL-MCNC: 14 MG/DL (ref 8–22)
CALCIUM ALBUM COR SERPL-MCNC: 9.7 MG/DL (ref 8.5–10.5)
CALCIUM SERPL-MCNC: 9.9 MG/DL (ref 8.5–10.5)
CHLORIDE SERPL-SCNC: 96 MMOL/L (ref 96–112)
CHOLEST SERPL-MCNC: 179 MG/DL (ref 100–199)
CO2 SERPL-SCNC: 23 MMOL/L (ref 20–33)
CREAT SERPL-MCNC: 0.51 MG/DL (ref 0.5–1.4)
EOSINOPHIL # BLD AUTO: 0.15 K/UL (ref 0–0.51)
EOSINOPHIL NFR BLD: 2 % (ref 0–6.9)
ERYTHROCYTE [DISTWIDTH] IN BLOOD BY AUTOMATED COUNT: 42.5 FL (ref 35.9–50)
FASTING STATUS PATIENT QL REPORTED: NORMAL
GFR SERPLBLD CREATININE-BSD FMLA CKD-EPI: 104 ML/MIN/1.73 M 2
GLOBULIN SER CALC-MCNC: 3.3 G/DL (ref 1.9–3.5)
GLUCOSE SERPL-MCNC: 124 MG/DL (ref 65–99)
HCT VFR BLD AUTO: 49.3 % (ref 37–47)
HDLC SERPL-MCNC: 41 MG/DL
HGB BLD-MCNC: 16.2 G/DL (ref 12–16)
IMM GRANULOCYTES # BLD AUTO: 0.02 K/UL (ref 0–0.11)
IMM GRANULOCYTES NFR BLD AUTO: 0.3 % (ref 0–0.9)
LDLC SERPL CALC-MCNC: 100 MG/DL
LYMPHOCYTES # BLD AUTO: 3.35 K/UL (ref 1–4.8)
LYMPHOCYTES NFR BLD: 44.4 % (ref 22–41)
MCH RBC QN AUTO: 29.8 PG (ref 27–33)
MCHC RBC AUTO-ENTMCNC: 32.9 G/DL (ref 32.2–35.5)
MCV RBC AUTO: 90.6 FL (ref 81.4–97.8)
MONOCYTES # BLD AUTO: 0.52 K/UL (ref 0–0.85)
MONOCYTES NFR BLD AUTO: 6.9 % (ref 0–13.4)
NEUTROPHILS # BLD AUTO: 3.47 K/UL (ref 1.82–7.42)
NEUTROPHILS NFR BLD: 46 % (ref 44–72)
NRBC # BLD AUTO: 0 K/UL
NRBC BLD-RTO: 0 /100 WBC (ref 0–0.2)
PLATELET # BLD AUTO: 290 K/UL (ref 164–446)
PMV BLD AUTO: 10.5 FL (ref 9–12.9)
POTASSIUM SERPL-SCNC: 4.8 MMOL/L (ref 3.6–5.5)
PROT SERPL-MCNC: 7.6 G/DL (ref 6–8.2)
RBC # BLD AUTO: 5.44 M/UL (ref 4.2–5.4)
SODIUM SERPL-SCNC: 133 MMOL/L (ref 135–145)
TRIGL SERPL-MCNC: 188 MG/DL (ref 0–149)
WBC # BLD AUTO: 7.5 K/UL (ref 4.8–10.8)

## 2024-09-24 PROCEDURE — 80061 LIPID PANEL: CPT

## 2024-09-24 PROCEDURE — 83036 HEMOGLOBIN GLYCOSYLATED A1C: CPT

## 2024-09-24 PROCEDURE — RXMED WILLOW AMBULATORY MEDICATION CHARGE: Performed by: INTERNAL MEDICINE

## 2024-09-24 PROCEDURE — 36415 COLL VENOUS BLD VENIPUNCTURE: CPT

## 2024-09-24 PROCEDURE — 84443 ASSAY THYROID STIM HORMONE: CPT

## 2024-09-24 PROCEDURE — 85025 COMPLETE CBC W/AUTO DIFF WBC: CPT

## 2024-09-24 PROCEDURE — 80053 COMPREHEN METABOLIC PANEL: CPT

## 2024-09-25 LAB
EST. AVERAGE GLUCOSE BLD GHB EST-MCNC: 157 MG/DL
HBA1C MFR BLD: 7.1 % (ref 4–5.6)
TSH SERPL DL<=0.005 MIU/L-ACNC: 1.26 UIU/ML (ref 0.38–5.33)

## 2024-10-03 ENCOUNTER — OFFICE VISIT (OUTPATIENT)
Dept: ENDOCRINOLOGY | Facility: MEDICAL CENTER | Age: 64
End: 2024-10-03
Attending: INTERNAL MEDICINE
Payer: COMMERCIAL

## 2024-10-03 VITALS
HEIGHT: 59 IN | BODY MASS INDEX: 34.88 KG/M2 | WEIGHT: 173 LBS | OXYGEN SATURATION: 98 % | SYSTOLIC BLOOD PRESSURE: 124 MMHG | HEART RATE: 74 BPM | DIASTOLIC BLOOD PRESSURE: 68 MMHG | RESPIRATION RATE: 17 BRPM

## 2024-10-03 DIAGNOSIS — I10 ESSENTIAL HYPERTENSION: ICD-10-CM

## 2024-10-03 DIAGNOSIS — E55.9 VITAMIN D DEFICIENCY: ICD-10-CM

## 2024-10-03 DIAGNOSIS — E78.5 DYSLIPIDEMIA: ICD-10-CM

## 2024-10-03 DIAGNOSIS — Z79.4 CONTROLLED TYPE 2 DIABETES MELLITUS WITHOUT COMPLICATION, WITH LONG-TERM CURRENT USE OF INSULIN (HCC): ICD-10-CM

## 2024-10-03 DIAGNOSIS — Z79.4 LONG-TERM INSULIN USE (HCC): ICD-10-CM

## 2024-10-03 DIAGNOSIS — E11.9 CONTROLLED TYPE 2 DIABETES MELLITUS WITHOUT COMPLICATION, WITH LONG-TERM CURRENT USE OF INSULIN (HCC): ICD-10-CM

## 2024-10-03 PROCEDURE — 3074F SYST BP LT 130 MM HG: CPT | Performed by: INTERNAL MEDICINE

## 2024-10-03 PROCEDURE — 99214 OFFICE O/P EST MOD 30 MIN: CPT | Performed by: INTERNAL MEDICINE

## 2024-10-03 PROCEDURE — 3078F DIAST BP <80 MM HG: CPT | Performed by: INTERNAL MEDICINE

## 2024-10-03 PROCEDURE — 99213 OFFICE O/P EST LOW 20 MIN: CPT | Performed by: INTERNAL MEDICINE

## 2024-10-03 RX ORDER — INSULIN DEGLUDEC 200 U/ML
45 INJECTION, SOLUTION SUBCUTANEOUS
Qty: 9 ML | Refills: 11 | Status: SHIPPED | OUTPATIENT
Start: 2024-10-03

## 2024-10-03 ASSESSMENT — FIBROSIS 4 INDEX: FIB4 SCORE: 1.27

## 2024-10-16 ENCOUNTER — OFFICE VISIT (OUTPATIENT)
Dept: MEDICAL GROUP | Facility: MEDICAL CENTER | Age: 64
End: 2024-10-16
Payer: COMMERCIAL

## 2024-10-16 VITALS
BODY MASS INDEX: 35.08 KG/M2 | DIASTOLIC BLOOD PRESSURE: 72 MMHG | SYSTOLIC BLOOD PRESSURE: 138 MMHG | HEIGHT: 59 IN | HEART RATE: 82 BPM | TEMPERATURE: 96.7 F | WEIGHT: 174 LBS | OXYGEN SATURATION: 97 %

## 2024-10-16 DIAGNOSIS — Z23 NEED FOR INFLUENZA VACCINATION: ICD-10-CM

## 2024-10-16 DIAGNOSIS — I10 ESSENTIAL HYPERTENSION: ICD-10-CM

## 2024-10-16 DIAGNOSIS — E78.5 DYSLIPIDEMIA: ICD-10-CM

## 2024-10-16 DIAGNOSIS — R79.89 LOW VITAMIN D LEVEL: ICD-10-CM

## 2024-10-16 DIAGNOSIS — M54.41 CHRONIC RIGHT-SIDED LOW BACK PAIN WITH RIGHT-SIDED SCIATICA: ICD-10-CM

## 2024-10-16 DIAGNOSIS — G89.29 CHRONIC RIGHT-SIDED LOW BACK PAIN WITH RIGHT-SIDED SCIATICA: ICD-10-CM

## 2024-10-16 PROCEDURE — RXMED WILLOW AMBULATORY MEDICATION CHARGE: Performed by: INTERNAL MEDICINE

## 2024-10-16 PROCEDURE — RXMED WILLOW AMBULATORY MEDICATION CHARGE: Performed by: PHYSICIAN ASSISTANT

## 2024-10-16 PROCEDURE — 90471 IMMUNIZATION ADMIN: CPT | Performed by: PHYSICIAN ASSISTANT

## 2024-10-16 PROCEDURE — 3075F SYST BP GE 130 - 139MM HG: CPT | Performed by: PHYSICIAN ASSISTANT

## 2024-10-16 PROCEDURE — 90656 IIV3 VACC NO PRSV 0.5 ML IM: CPT | Performed by: PHYSICIAN ASSISTANT

## 2024-10-16 PROCEDURE — 3078F DIAST BP <80 MM HG: CPT | Performed by: PHYSICIAN ASSISTANT

## 2024-10-16 PROCEDURE — 99214 OFFICE O/P EST MOD 30 MIN: CPT | Mod: 25 | Performed by: PHYSICIAN ASSISTANT

## 2024-10-16 RX ORDER — AMLODIPINE BESYLATE 5 MG/1
5 TABLET ORAL DAILY
Qty: 90 TABLET | Refills: 3 | Status: SHIPPED | OUTPATIENT
Start: 2024-10-16

## 2024-10-16 RX ORDER — AMLODIPINE BESYLATE 5 MG/1
5 TABLET ORAL DAILY
COMMUNITY

## 2024-10-16 ASSESSMENT — FIBROSIS 4 INDEX: FIB4 SCORE: 1.27

## 2024-10-17 ENCOUNTER — PHARMACY VISIT (OUTPATIENT)
Dept: PHARMACY | Facility: MEDICAL CENTER | Age: 64
End: 2024-10-17
Payer: COMMERCIAL

## 2024-10-17 PROCEDURE — RXMED WILLOW AMBULATORY MEDICATION CHARGE: Performed by: INTERNAL MEDICINE

## 2024-10-25 PROCEDURE — RXMED WILLOW AMBULATORY MEDICATION CHARGE: Performed by: PHYSICIAN ASSISTANT

## 2024-10-30 PROCEDURE — RXMED WILLOW AMBULATORY MEDICATION CHARGE: Performed by: PHYSICIAN ASSISTANT

## 2024-11-04 ENCOUNTER — PHARMACY VISIT (OUTPATIENT)
Dept: PHARMACY | Facility: MEDICAL CENTER | Age: 64
End: 2024-11-04
Payer: COMMERCIAL

## 2024-11-12 PROCEDURE — RXMED WILLOW AMBULATORY MEDICATION CHARGE: Performed by: INTERNAL MEDICINE

## 2024-11-13 ENCOUNTER — PHARMACY VISIT (OUTPATIENT)
Dept: PHARMACY | Facility: MEDICAL CENTER | Age: 64
End: 2024-11-13
Payer: COMMERCIAL

## 2024-11-25 PROCEDURE — RXMED WILLOW AMBULATORY MEDICATION CHARGE: Performed by: PHYSICIAN ASSISTANT

## 2024-11-27 ENCOUNTER — PHARMACY VISIT (OUTPATIENT)
Dept: PHARMACY | Facility: MEDICAL CENTER | Age: 64
End: 2024-11-27
Payer: COMMERCIAL

## 2024-12-10 PROCEDURE — RXMED WILLOW AMBULATORY MEDICATION CHARGE: Performed by: INTERNAL MEDICINE

## 2024-12-11 ENCOUNTER — PHARMACY VISIT (OUTPATIENT)
Dept: PHARMACY | Facility: MEDICAL CENTER | Age: 64
End: 2024-12-11
Payer: COMMERCIAL

## 2025-01-02 DIAGNOSIS — Z79.4 CONTROLLED TYPE 2 DIABETES MELLITUS WITHOUT COMPLICATION, WITH LONG-TERM CURRENT USE OF INSULIN (HCC): ICD-10-CM

## 2025-01-02 DIAGNOSIS — E11.9 CONTROLLED TYPE 2 DIABETES MELLITUS WITHOUT COMPLICATION, WITH LONG-TERM CURRENT USE OF INSULIN (HCC): ICD-10-CM

## 2025-01-02 RX ORDER — SEMAGLUTIDE 2.68 MG/ML
INJECTION, SOLUTION SUBCUTANEOUS
Qty: 3 ML | Refills: 3 | Status: SHIPPED | OUTPATIENT
Start: 2025-01-02

## 2025-01-07 PROCEDURE — RXMED WILLOW AMBULATORY MEDICATION CHARGE: Performed by: INTERNAL MEDICINE

## 2025-01-08 PROCEDURE — RXMED WILLOW AMBULATORY MEDICATION CHARGE: Performed by: INTERNAL MEDICINE

## 2025-01-09 ENCOUNTER — PHARMACY VISIT (OUTPATIENT)
Dept: PHARMACY | Facility: MEDICAL CENTER | Age: 65
End: 2025-01-09
Payer: COMMERCIAL

## 2025-01-17 NOTE — PROGRESS NOTES
Natali Jones is a 25 year old female here in clinic for GYN exam.      Concerns:  patient would like to discuss birth control options. Patient states a hx ovarian cyst. Patient states concern with ph balance since being on IUD.    Last pap:  6/4/21 nil  Last mammogram: n/a  Contraception:  Liletta placed 12/2017    Denies known Latex allergy or symptoms of Latex sensitivity.  Medications verified, no changes.  Raven Lares MD    Health Maintenance Due   Topic Date Due    Hepatitis B Vaccine (3 of 3 - 3-dose series) 1999    Pneumococcal Vaccine 0-64 (1 of 2 - PCV) Never done    Varicella Vaccine (2 of 2 - 2-dose childhood series) 11/16/2013    Cervical Cancer Screening  06/04/2024    Influenza Vaccine (1) 09/01/2024    COVID-19 Vaccine (4 - 2024-25 season) 09/01/2024         Recent PHQ 2/9 Score    PHQ 2:  PHQ 2 Score Adult PHQ 2 Score Adult PHQ 2 Interpretation Little interest or pleasure in activity?   7/2/2024   7:54 AM 0 No further screening needed 0       PHQ 9:               Lizbeth-Susan Risk Scores as of 1/21/2025       Vanessa         Patient Population    Breast cancer  (CMD) 5-year 0.07% 0.06%    Breast cancer  (CMD) 10-year 0.26% 0.21%    Breast cancer  (CMD) lifetime 13.73% 11.2%    Breast cancer genetic susceptibility 0.18%     Breast cancer, BRCA1 positive  (CMD) 0.06%     Breast cancer, BRCA2 positive  (CMD) 0.13%                       BRCAPRO Risk Score as of 1/21/2025       BRCAPRO              Ovarian cancer  (CMD) 5-year 0.01%    Ovarian cancer  (CMD) 10-year 0.03%    Ovarian cancer  (CMD) lifetime 1.37%    Breast cancer  (CMD) 5-year 0.07%    Breast cancer  (CMD) 10-year 0.24%    Breast cancer  (CMD) lifetime 14.56%    BRCA gene positive 0%    Breast cancer genetic susceptibility 0.25%    Breast cancer, BRCA1 positive  (CMD) 0.12%    Breast cancer, BRCA2 positive  (CMD) 0.13%                             Chaperone present during sensitive exam: No, patient declined  Subjective:   Rosario Blevins is a 57 y.o. female here today for diabetes, lipoma of the back, history of shingles and hyperlipidemia.    Uncontrolled type 2 diabetes mellitus with hyperglycemia (HCC)  This is a 57-year-old female who is here today to discuss her diabetes. Recently A1c was at 9.7. It is never been above 9 in the past. She is taking Tradjenta and metformin 500 mg 3 times a day. Metformin does cause diarrhea. She is really concerned about taking other medications for her diabetes because of the co-pay. The past she declined insulin because of the co-pay.    Lipoma of back  Complains of a mass on the upper back on the left side. It's been there for several years. Denies any pain. Told by her previous PCP that it was benign.    History of shingles  Has a history of shingles on her torso. No recent exacerbation.    Dyslipidemia  Recent cholesterol profile showed triglycerides in the 200s. She is on Crestor 40 mg daily. Chronic condition.       Current medicines (including changes today)  Current Outpatient Prescriptions   Medication Sig Dispense Refill   • linagliptin (TRADJENTA) 5 MG Tab tablet Take 1 Tab by mouth every day. 30 Tab 5   • omeprazole (PRILOSEC) 40 MG delayed-release capsule Take 1 Cap by mouth every day. 30 Cap 3   • rosuvastatin (CRESTOR) 40 MG tablet Take 1 Tab by mouth every day. 30 Tab 3   • ibuprofen (MOTRIN) 800 MG Tab Take 1 Tab by mouth every 8 hours as needed. 30 Tab 3   • estradiol (ESTRACE) 1 MG Tab Take 1 Tab by mouth every day. 30 Tab 5   • lisinopril (PRINIVIL) 20 MG Tab Take 1 Tab by mouth every day. 90 Tab 3   • metFORMIN (GLUCOPHAGE) 500 MG Tab Take 1 Tab by mouth 3 times a day. 90 Tab 11   • Multiple Vitamins-Minerals (CENTRUM SILVER PO) Take  by mouth every day.       No current facility-administered medications for this visit.      She  has a past medical history of Diabetes; Hyperlipidemia; and Hypertension.    Social History and Family History were reviewed and  chaperone.                                                                                                                                             Patient would like communication of their results via:    Karma     updated.    ROS   No chest pain, no shortness of breath, no abdominal pain and all other systems were reviewed and are negative.       Objective:     Blood pressure 121/60, pulse 88, temperature 36.2 °C (97.2 °F), height 1.524 m (5'), weight 82.1 kg (181 lb), SpO2 98 %, not currently breastfeeding. Body mass index is 35.35 kg/m².   Physical Exam:  Constitutional: Alert, no distress.  Skin: Warm, dry, good turgor, no rashes in visible areas. Large subcutaneous mass on the left side of the posterior shoulder. No tenderness noted. No erythema.  Eye: Equal, round and reactive, conjunctiva clear, lids normal.  ENMT: Lips without lesions, good dentition, oropharynx clear.  Neck: Trachea midline, no masses.   Lymph: No cervical or supraclavicular lymphadenopathy  Respiratory: Unlabored respiratory effort, lungs clear to auscultation, no wheezes, no ronchi.  Cardiovascular: Normal S1, S2, no murmur, no edema.  Abdomen: Soft, non-tender, no masses.  Psych: Alert and oriented x3, normal affect and mood.        Assessment and Plan:   The following treatment plan was discussed    1. Uncontrolled type 2 diabetes mellitus with hyperglycemia (HCC)  Chronic condition. Uncontrolled. Referred to endocrinology. Provided information to make an appointment. Referred also to diabetic education. Continue medications. She will need to be on an insulin type medication.  - REFERRAL TO DIABETIC EDUCATION Diabetes Self Management Education / Training (DSME/T) and Medical Nutrition Therapy (MNT): Initial Group DSME/MNT as authorized by payor, Follow-Up DSME/MNT as authorized by payor; DSME/T Content: Monitoring Diabete...    2. Lipoma of back  Chronic condition. New condition noted in chart. Benign. Contact me if symptoms change and she becomes symptomatic.    3. History of shingles  Chronic history. Discussed shingles vaccination. Must go to pharmacy to obtain it.    4. Dyslipidemia  Chronic condition. Triglycerides uncontrolled. Exercise  routinely. Continue Crestor as directed.    Patient was seen for 25 minutes face to face of which > 50% of appointment time was spent on counseling and coordination of care regarding the above.        Followup: Return in about 3 months (around 1/18/2019).    Please note that this dictation was created using voice recognition software. I have made every reasonable attempt to correct obvious errors, but I expect that there are errors of grammar and possibly content that I did not discover before finalizing the note.

## 2025-01-24 PROCEDURE — RXMED WILLOW AMBULATORY MEDICATION CHARGE: Performed by: PHYSICIAN ASSISTANT

## 2025-01-27 ENCOUNTER — PHARMACY VISIT (OUTPATIENT)
Dept: PHARMACY | Facility: MEDICAL CENTER | Age: 65
End: 2025-01-27
Payer: COMMERCIAL

## 2025-01-27 PROCEDURE — RXMED WILLOW AMBULATORY MEDICATION CHARGE: Performed by: PHYSICIAN ASSISTANT

## 2025-02-04 PROCEDURE — RXMED WILLOW AMBULATORY MEDICATION CHARGE: Performed by: INTERNAL MEDICINE

## 2025-02-05 ENCOUNTER — PHARMACY VISIT (OUTPATIENT)
Dept: PHARMACY | Facility: MEDICAL CENTER | Age: 65
End: 2025-02-05
Payer: COMMERCIAL

## 2025-02-05 PROCEDURE — RXMED WILLOW AMBULATORY MEDICATION CHARGE: Performed by: INTERNAL MEDICINE

## 2025-02-07 ENCOUNTER — PHARMACY VISIT (OUTPATIENT)
Dept: PHARMACY | Facility: MEDICAL CENTER | Age: 65
End: 2025-02-07
Payer: COMMERCIAL

## 2025-02-18 ENCOUNTER — PHARMACY VISIT (OUTPATIENT)
Dept: PHARMACY | Facility: MEDICAL CENTER | Age: 65
End: 2025-02-18
Payer: COMMERCIAL

## 2025-02-18 PROCEDURE — RXMED WILLOW AMBULATORY MEDICATION CHARGE: Performed by: PHYSICIAN ASSISTANT

## 2025-03-11 PROCEDURE — RXMED WILLOW AMBULATORY MEDICATION CHARGE: Performed by: INTERNAL MEDICINE

## 2025-03-12 ENCOUNTER — PHARMACY VISIT (OUTPATIENT)
Dept: PHARMACY | Facility: MEDICAL CENTER | Age: 65
End: 2025-03-12
Payer: COMMERCIAL

## 2025-03-18 PROCEDURE — RXMED WILLOW AMBULATORY MEDICATION CHARGE: Performed by: INTERNAL MEDICINE

## 2025-03-19 ENCOUNTER — PHARMACY VISIT (OUTPATIENT)
Dept: PHARMACY | Facility: MEDICAL CENTER | Age: 65
End: 2025-03-19
Payer: COMMERCIAL

## 2025-04-07 PROCEDURE — RXMED WILLOW AMBULATORY MEDICATION CHARGE: Performed by: INTERNAL MEDICINE

## 2025-04-09 ENCOUNTER — PHARMACY VISIT (OUTPATIENT)
Dept: PHARMACY | Facility: MEDICAL CENTER | Age: 65
End: 2025-04-09
Payer: COMMERCIAL

## 2025-04-10 ENCOUNTER — HOSPITAL ENCOUNTER (OUTPATIENT)
Dept: LAB | Facility: MEDICAL CENTER | Age: 65
End: 2025-04-10
Attending: INTERNAL MEDICINE
Payer: COMMERCIAL

## 2025-04-10 DIAGNOSIS — Z79.4 CONTROLLED TYPE 2 DIABETES MELLITUS WITHOUT COMPLICATION, WITH LONG-TERM CURRENT USE OF INSULIN (HCC): ICD-10-CM

## 2025-04-10 DIAGNOSIS — E11.9 CONTROLLED TYPE 2 DIABETES MELLITUS WITHOUT COMPLICATION, WITH LONG-TERM CURRENT USE OF INSULIN (HCC): ICD-10-CM

## 2025-04-10 DIAGNOSIS — Z79.4 LONG-TERM INSULIN USE (HCC): ICD-10-CM

## 2025-04-10 DIAGNOSIS — E78.5 DYSLIPIDEMIA: ICD-10-CM

## 2025-04-10 DIAGNOSIS — I10 ESSENTIAL HYPERTENSION: ICD-10-CM

## 2025-04-10 DIAGNOSIS — E55.9 VITAMIN D DEFICIENCY: ICD-10-CM

## 2025-04-10 LAB
25(OH)D3 SERPL-MCNC: 31 NG/ML (ref 30–100)
ALBUMIN SERPL BCP-MCNC: 4.2 G/DL (ref 3.2–4.9)
ALBUMIN/GLOB SERPL: 1.2 G/DL
ALP SERPL-CCNC: 47 U/L (ref 30–99)
ALT SERPL-CCNC: 45 U/L (ref 2–50)
ANION GAP SERPL CALC-SCNC: 10 MMOL/L (ref 7–16)
AST SERPL-CCNC: 39 U/L (ref 12–45)
BILIRUB SERPL-MCNC: 0.8 MG/DL (ref 0.1–1.5)
BUN SERPL-MCNC: 13 MG/DL (ref 8–22)
CALCIUM ALBUM COR SERPL-MCNC: 9.9 MG/DL (ref 8.5–10.5)
CALCIUM SERPL-MCNC: 10.1 MG/DL (ref 8.5–10.5)
CHLORIDE SERPL-SCNC: 96 MMOL/L (ref 96–112)
CHOLEST SERPL-MCNC: 160 MG/DL (ref 100–199)
CO2 SERPL-SCNC: 26 MMOL/L (ref 20–33)
CREAT SERPL-MCNC: 0.58 MG/DL (ref 0.5–1.4)
CREAT UR-MCNC: 16.4 MG/DL
ERYTHROCYTE [DISTWIDTH] IN BLOOD BY AUTOMATED COUNT: 43.2 FL (ref 35.9–50)
GFR SERPLBLD CREATININE-BSD FMLA CKD-EPI: 101 ML/MIN/1.73 M 2
GLOBULIN SER CALC-MCNC: 3.4 G/DL (ref 1.9–3.5)
GLUCOSE SERPL-MCNC: 119 MG/DL (ref 65–99)
HCT VFR BLD AUTO: 50 % (ref 37–47)
HDLC SERPL-MCNC: 46 MG/DL
HGB BLD-MCNC: 16.6 G/DL (ref 12–16)
LDLC SERPL CALC-MCNC: 87 MG/DL
MCH RBC QN AUTO: 29.4 PG (ref 27–33)
MCHC RBC AUTO-ENTMCNC: 33.2 G/DL (ref 32.2–35.5)
MCV RBC AUTO: 88.5 FL (ref 81.4–97.8)
MICROALBUMIN UR-MCNC: <1.2 MG/DL
MICROALBUMIN/CREAT UR: NORMAL MG/G (ref 0–30)
PLATELET # BLD AUTO: 304 K/UL (ref 164–446)
PMV BLD AUTO: 10.5 FL (ref 9–12.9)
POTASSIUM SERPL-SCNC: 4.2 MMOL/L (ref 3.6–5.5)
PROT SERPL-MCNC: 7.6 G/DL (ref 6–8.2)
RBC # BLD AUTO: 5.65 M/UL (ref 4.2–5.4)
SODIUM SERPL-SCNC: 132 MMOL/L (ref 135–145)
TRIGL SERPL-MCNC: 137 MG/DL (ref 0–149)
WBC # BLD AUTO: 5.5 K/UL (ref 4.8–10.8)

## 2025-04-10 PROCEDURE — 85027 COMPLETE CBC AUTOMATED: CPT

## 2025-04-10 PROCEDURE — 80061 LIPID PANEL: CPT

## 2025-04-10 PROCEDURE — 80053 COMPREHEN METABOLIC PANEL: CPT

## 2025-04-10 PROCEDURE — 82043 UR ALBUMIN QUANTITATIVE: CPT

## 2025-04-10 PROCEDURE — 36415 COLL VENOUS BLD VENIPUNCTURE: CPT

## 2025-04-10 PROCEDURE — 82570 ASSAY OF URINE CREATININE: CPT

## 2025-04-10 PROCEDURE — 82306 VITAMIN D 25 HYDROXY: CPT

## 2025-04-11 ENCOUNTER — RESULTS FOLLOW-UP (OUTPATIENT)
Dept: MEDICAL GROUP | Facility: MEDICAL CENTER | Age: 65
End: 2025-04-11

## 2025-04-16 ENCOUNTER — OFFICE VISIT (OUTPATIENT)
Dept: MEDICAL GROUP | Facility: MEDICAL CENTER | Age: 65
End: 2025-04-16
Payer: COMMERCIAL

## 2025-04-16 VITALS
SYSTOLIC BLOOD PRESSURE: 136 MMHG | HEIGHT: 60 IN | DIASTOLIC BLOOD PRESSURE: 66 MMHG | OXYGEN SATURATION: 99 % | HEART RATE: 86 BPM | TEMPERATURE: 97.2 F | WEIGHT: 171.6 LBS | BODY MASS INDEX: 33.69 KG/M2

## 2025-04-16 DIAGNOSIS — M79.672 LEFT FOOT PAIN: ICD-10-CM

## 2025-04-16 DIAGNOSIS — E11.9 CONTROLLED TYPE 2 DIABETES MELLITUS WITHOUT COMPLICATION, WITH LONG-TERM CURRENT USE OF INSULIN (HCC): ICD-10-CM

## 2025-04-16 DIAGNOSIS — M25.512 ACUTE PAIN OF LEFT SHOULDER: ICD-10-CM

## 2025-04-16 DIAGNOSIS — M54.41 CHRONIC RIGHT-SIDED LOW BACK PAIN WITH RIGHT-SIDED SCIATICA: ICD-10-CM

## 2025-04-16 DIAGNOSIS — G89.29 CHRONIC RIGHT-SIDED LOW BACK PAIN WITH RIGHT-SIDED SCIATICA: ICD-10-CM

## 2025-04-16 DIAGNOSIS — Z79.4 CONTROLLED TYPE 2 DIABETES MELLITUS WITHOUT COMPLICATION, WITH LONG-TERM CURRENT USE OF INSULIN (HCC): ICD-10-CM

## 2025-04-16 PROBLEM — M79.605 LEFT LEG PAIN: Status: RESOLVED | Noted: 2019-04-18 | Resolved: 2025-04-16

## 2025-04-16 PROCEDURE — 3078F DIAST BP <80 MM HG: CPT | Performed by: PHYSICIAN ASSISTANT

## 2025-04-16 PROCEDURE — 3075F SYST BP GE 130 - 139MM HG: CPT | Performed by: PHYSICIAN ASSISTANT

## 2025-04-16 PROCEDURE — 99214 OFFICE O/P EST MOD 30 MIN: CPT | Performed by: PHYSICIAN ASSISTANT

## 2025-04-16 ASSESSMENT — FIBROSIS 4 INDEX: FIB4 SCORE: 1.22

## 2025-04-16 ASSESSMENT — PATIENT HEALTH QUESTIONNAIRE - PHQ9: CLINICAL INTERPRETATION OF PHQ2 SCORE: 0

## 2025-04-16 NOTE — PROGRESS NOTES
Subjective:     History of Present Illness  The patient presents for a follow-up visit.    Concerns are expressed regarding elevated values in her recent complete blood count, which were previously low. Additionally, a decrease in sodium levels is noted, attributed to an aversion to salt. Cholesterol and kidney function are reported to be good, and a microalbumin test was negative. Vitamin D levels are slightly low but within the normal range, despite not taking a vitamin D supplement. An upcoming appointment with Dr. Almonte is scheduled for tomorrow to assess A1c levels. Current medications include Ozempic 2 mg, Tresiba, rosuvastatin, lisinopril, amlodipine, and omeprazole. A diet consisting of coffee, a slice of toast, and two strips of pineapple for breakfast, minimal food intake at work, and a light dinner is maintained, with successful weight maintenance reported.    Chronic pain in the left arm has been experienced for several months, believed to be due to a muscle strain. Despite engaging in exercises, persistent pain is reported when moving the arm backward. A previous injury to the same arm during childhood is recalled. Stretching exercises are performed at night and in the morning, which are found to be beneficial. Consultation with a specialist for this issue is not desired.    A recent foot injury is reported, suspected to be nerve-related due to tenderness. Improvement in the condition over the past week is noted. Leg pain continues to be experienced, attributed to prolonged standing exceeding 12 hours. Initially thought to be muscular in nature, the pain is now believed to be related to the sciatic nerve. No discoloration (black and blue) is observed in the affected area. Chronic pain is managed with ibuprofen, as Advil is found to be ineffective. Ibuprofen was taken a few weeks ago for severe sciatic nerve pain but is generally avoided unless necessary.      Current medicines (including changes  today)  Current Outpatient Medications   Medication Sig Dispense Refill    Semaglutide, 2 MG/DOSE, (OZEMPIC, 2 MG/DOSE,) 8 MG/3ML Solution Pen-injector Inject 2 MG under the skin every 7 days 3 mL 3    amLODIPine (NORVASC) 5 MG Tab Take 1 Tablet by mouth every day. 90 Tablet 3    Insulin Degludec (TRESIBA FLEXTOUCH) 200 UNIT/ML Solution Pen-injector Inject 45 Units under the skin at bedtime. 9 mL 11    lisinopril (PRINIVIL) 20 MG Tab Take 1 Tablet by mouth every day. 90 Tablet 2    estradiol (ESTRACE) 1 MG Tab Take 1 Tablet by mouth every day. 90 Tablet 2    omeprazole (PRILOSEC) 40 MG delayed-release capsule Take 1 Capsule by mouth every day. 90 Capsule 2    rosuvastatin (CRESTOR) 40 MG tablet Take 1 Tablet by mouth every day. 90 Tablet 3     No current facility-administered medications for this visit.     She  has a past medical history of Diabetes, Hyperlipidemia, and Hypertension.    ROS   No chest pain, no shortness of breath, no abdominal pain  Positive ROS as per HPI.  All other systems reviewed and are negative.     Objective:     /66 (BP Location: Right arm, Patient Position: Sitting, BP Cuff Size: Adult)   Pulse 86   Temp 36.2 °C (97.2 °F) (Temporal)   Ht 1.524 m (5')   Wt 77.8 kg (171 lb 9.6 oz)   SpO2 99%  Body mass index is 33.51 kg/m².   Physical Exam  Musculoskeletal: Left shoulder examined, good mobility except for one movement, no rotator cuff injury suspected. Possible muscle strain.  Other: Weight loss of 3 pounds noted.  Constitutional: Alert, no distress.  Skin: Warm, dry, good turgor, no rashes in visible areas.  Eye: Equal, round and reactive, conjunctiva clear, lids normal.  ENMT: Lips without lesions, good dentition, oropharynx clear.  Neck: Trachea midline, no masses, no thyromegaly.  Psych: Alert and oriented x3, normal affect and mood.      Results  Labs   - Complete Blood Count: Mildly above normal range   - Sodium: Low   - Cholesterol: Normal   - Kidney Function: Normal   -  Microalbumin Test: Negative   - Vitamin D: Slightly low        Assessment and Plan:   The following treatment plan was discussed    Assessment & Plan  1. Health Maintenance.  - Cholesterol levels are within the normal range, and kidney function is satisfactory.  - Microalbumin test yielded negative results, indicating no abnormalities. Vitamin D levels was in normal range though low end of normal.  - Weight loss of 3 pounds since the last visit.  - Advised to receive the Tdap vaccine at her local pharmacy. Colon cancer screening discussed, with consideration for the next visit.    2. Left shoulder pain. Acute.  - Pain likely due to a muscle strain rather than a rotator cuff injury.  - Good shoulder mobility except for one movement.  - Continued exercise regimen advised.    3. Left foot pain. Acute.  - Pain expected to resolve over time.  - No specific treatment recommended at this time.    4.  Type 2 diabetes.  - Chronic condition.  Likely stable.  Continue diabetic medications as directed.  Also continue rosuvastatin.  Cholesterol well-controlled.  Follow-up tomorrow with appointment with Endo.    Follow-up  - Scheduled for a follow-up visit in 10/2025.      ORDERS:  1. Controlled type 2 diabetes mellitus without complication, with long-term current use of insulin (HCC)      2. Acute pain of left shoulder      3. Left foot pain      4. Chronic right-sided low back pain with right-sided sciatica          Please note that this dictation was created using voice recognition software. I have made every reasonable attempt to correct obvious errors, but I expect that there are errors of grammar and possibly content that I did not discover before finalizing the note.      Attestation      Verbal consent was acquired by the patient to use [employer] ambient listening note generation during this visit Yes

## 2025-04-17 ENCOUNTER — OFFICE VISIT (OUTPATIENT)
Dept: ENDOCRINOLOGY | Facility: MEDICAL CENTER | Age: 65
End: 2025-04-17
Attending: INTERNAL MEDICINE
Payer: COMMERCIAL

## 2025-04-17 ENCOUNTER — PHARMACY VISIT (OUTPATIENT)
Dept: PHARMACY | Facility: MEDICAL CENTER | Age: 65
End: 2025-04-17
Payer: COMMERCIAL

## 2025-04-17 VITALS
DIASTOLIC BLOOD PRESSURE: 78 MMHG | OXYGEN SATURATION: 98 % | HEART RATE: 78 BPM | WEIGHT: 170.7 LBS | HEIGHT: 59 IN | BODY MASS INDEX: 34.41 KG/M2 | SYSTOLIC BLOOD PRESSURE: 126 MMHG

## 2025-04-17 DIAGNOSIS — Z79.4 CONTROLLED TYPE 2 DIABETES MELLITUS WITHOUT COMPLICATION, WITH LONG-TERM CURRENT USE OF INSULIN (HCC): ICD-10-CM

## 2025-04-17 DIAGNOSIS — E78.5 DYSLIPIDEMIA: ICD-10-CM

## 2025-04-17 DIAGNOSIS — I10 ESSENTIAL HYPERTENSION: ICD-10-CM

## 2025-04-17 DIAGNOSIS — E11.9 CONTROLLED TYPE 2 DIABETES MELLITUS WITHOUT COMPLICATION, WITH LONG-TERM CURRENT USE OF INSULIN (HCC): ICD-10-CM

## 2025-04-17 DIAGNOSIS — E55.9 VITAMIN D DEFICIENCY: ICD-10-CM

## 2025-04-17 DIAGNOSIS — Z79.4 LONG-TERM INSULIN USE (HCC): ICD-10-CM

## 2025-04-17 LAB
HBA1C MFR BLD: 6.8 % (ref ?–5.8)
POCT INT CON NEG: NEGATIVE
POCT INT CON POS: POSITIVE

## 2025-04-17 PROCEDURE — 3074F SYST BP LT 130 MM HG: CPT | Performed by: INTERNAL MEDICINE

## 2025-04-17 PROCEDURE — 99214 OFFICE O/P EST MOD 30 MIN: CPT | Performed by: INTERNAL MEDICINE

## 2025-04-17 PROCEDURE — 99212 OFFICE O/P EST SF 10 MIN: CPT | Performed by: INTERNAL MEDICINE

## 2025-04-17 PROCEDURE — RXMED WILLOW AMBULATORY MEDICATION CHARGE: Performed by: INTERNAL MEDICINE

## 2025-04-17 PROCEDURE — 83036 HEMOGLOBIN GLYCOSYLATED A1C: CPT | Performed by: INTERNAL MEDICINE

## 2025-04-17 PROCEDURE — 3078F DIAST BP <80 MM HG: CPT | Performed by: INTERNAL MEDICINE

## 2025-04-17 RX ORDER — SEMAGLUTIDE 2.68 MG/ML
INJECTION, SOLUTION SUBCUTANEOUS
Qty: 3 ML | Refills: 3 | Status: SHIPPED | OUTPATIENT
Start: 2025-04-17

## 2025-04-17 RX ORDER — INSULIN DEGLUDEC 200 U/ML
45 INJECTION, SOLUTION SUBCUTANEOUS
Qty: 9 ML | Refills: 11 | Status: SHIPPED | OUTPATIENT
Start: 2025-04-17

## 2025-04-17 RX ORDER — SEMAGLUTIDE 2.68 MG/ML
INJECTION, SOLUTION SUBCUTANEOUS
Qty: 3 ML | Refills: 3 | Status: CANCELLED | OUTPATIENT
Start: 2025-04-17

## 2025-04-17 ASSESSMENT — FIBROSIS 4 INDEX: FIB4 SCORE: 1.22

## 2025-04-17 NOTE — PROGRESS NOTES
CHIEF COMPLAINT: Patient is here for follow up of Type 2 Diabetes Mellitus    HPI:     Rosario Blevins is a 64 y.o. female with Type 2 Diabetes Mellitus here for follow up.    Labs from 4/17/2025 show A1c is 6.8%  Labs from 9/24/2024 show A1c is 7.1%  Labs from 4/2/2024 show A1c is 7.3%  Labs from 11/1/2023 show A1c was 6.2%  Labs from 5/10/2023 show a1c was 7.1%  Labs from 11/2/2022 show a1c was 6.4%  Labs from 7/20/2022 show a1c was 7.3%  Labs from 4/6/2022 show a1c was 8.3%  Labs from October 6, 2021 show A1c was 6.8%  Labs from March 24, 2021 show A1c was 7%  Labs from 5/28/2020 show A1c was 7.2%      She has a history of intolerance of Metformin because of diarrhea   She had recurrent yeast infections with an SGLT2 inhibitor specifically Farxiga  She has a history of low back pain with radiculopathy      She is taking   Tresiba 44u daily, and   Ozempic 2.0 mg once a week      She denies SE with her meds  She reports her back pain is controlled   She walks 25,000 steps daily      She has hyperlipidemia and is on generic Crestor 40mg daily  She has no history of CAD  She denies symptoms of statin related myopathy   Latest Reference Range & Units 04/10/25 06:40   Cholesterol,Tot 100 - 199 mg/dL 160   Triglycerides 0 - 149 mg/dL 137   HDL >=40 mg/dL 46   LDL <100 mg/dL 87         She has essential hypertension is taking Lisinopril 20mg daily and Amlodipine 5mg daily  She is tolerating her medication fairly well  She does not have diabetic nephropathy at baseline   Latest Reference Range & Units 04/10/25 06:40   Micro Alb Creat Ratio 0 - 30 mg/g see below   Creatinine, Urine mg/dL 16.40   Microalbumin, Urine Random mg/dL <1.2         She has no diabetic retinopathy   Eye exam was on 2024 with Dr. Aspen Rey vitamin D is 31 on 4/2025  She reports that she stopped vitamin D due to constipation         BG Diary:  Patient forgot her meter    Weight has been stable    Diabetes Complications   Retinopathy: No  known retinopathy.  Last eye exam: 2024 with Dr. Taylor  Neuropathy: Denies paresthesias or numbness in hands or feet. Denies any foot wounds.  Exercise: Minimal.  Diet: Fair.  Patient's medications, allergies, and social histories were reviewed and updated as appropriate.    ROS:     CONS:     No fever, no chills   EYES:     No diplopia, no blurry vision   CV:           No chest pain, no palpitations   PULM:     No SOB, no cough, no hemoptysis.   GI:            No nausea, no vomiting, no diarrhea, no constipation   ENDO:     No polyuria, no polydipsia, no heat intolerance, no cold intolerance       Past Medical History:  Problem List:  2025: Acute pain of left shoulder  2025: Left foot pain  2023-10: Colon cancer screening declined  2023-10: Low vitamin D level  2023: Chronic right-sided low back pain with right-sided sciatica  2023: Sciatica of right side  2022-10: Acute recurrent frontal sinusitis  2022: Left arm pain  2021: Acute pansinusitis  2021: Achilles tendon pain  2020: Long-term insulin use ([facility])  2019: Left leg pain  2019: Influenza vaccination given  2018-10: Lipoma of back  2018-10: History of shingles  2018: Obesity (BMI 30-39.9)  2018: GERD (gastroesophageal reflux disease)  2013: Controlled type 2 diabetes mellitus without complication,   with long-term current use of insulin ([facility])  2013: Essential hypertension  2013: Dyslipidemia      Past Surgical History:  Past Surgical History:   Procedure Laterality Date    CATARACT EXTRACTION WITH IOL Bilateral     HYSTERECTOMY, TOTAL ABDOMINAL      US-NEEDLE CORE BX-BREAST PANEL          Allergies:  Milk products food allergy and Codeine     Social History:  Social History     Tobacco Use    Smoking status: Former     Current packs/day: 0.00     Types: Cigarettes     Quit date: 1991     Years since quittin.1    Smokeless tobacco: Never   Vaping Use    Vaping status: Never Used   Substance Use  "Topics    Alcohol use: No    Drug use: No        Family History:   family history includes Cancer in her mother; Heart Attack (age of onset: 38) in her maternal uncle; Heart Attack (age of onset: 40) in her maternal uncle; Heart Attack (age of onset: 50) in her maternal grandmother; No Known Problems in her father; Other in her brother and sister; Other (age of onset: 47) in her mother.      PHYSICAL EXAM:   OBJECTIVE:  Vital signs: /78   Pulse 78   Ht 1.499 m (4' 11\")   Wt 77.4 kg (170 lb 11.2 oz)   SpO2 98%   BMI 34.48 kg/m²   GENERAL: Well-developed, well-nourished in no apparent distress.   EYE:  No ocular asymmetry, PERRLA  HENT: Pink, moist mucous membranes.    NECK: No thyromegaly.   CARDIOVASCULAR:  No murmurs  LUNGS: Clear breath sounds  ABDOMEN: Soft, nontender   EXTREMITIES: No clubbing, cyanosis, or edema.   NEUROLOGICAL: No gross focal motor abnormalities   LYMPH: No cervical adenopathy palpated.   SKIN: No rashes, she has a palpable soft, movable, lipoma on her upper back near the midline    Labs:  Lab Results   Component Value Date/Time    HBA1C 6.8 (A) 04/17/2025 09:37 AM        Lab Results   Component Value Date/Time    WBC 5.5 04/10/2025 06:40 AM    RBC 5.65 (H) 04/10/2025 06:40 AM    HEMOGLOBIN 16.6 (H) 04/10/2025 06:40 AM    MCV 88.5 04/10/2025 06:40 AM    MCH 29.4 04/10/2025 06:40 AM    MCHC 33.2 04/10/2025 06:40 AM    RDW 43.2 04/10/2025 06:40 AM    MPV 10.5 04/10/2025 06:40 AM       Lab Results   Component Value Date/Time    SODIUM 132 (L) 04/10/2025 06:40 AM    POTASSIUM 4.2 04/10/2025 06:40 AM    CHLORIDE 96 04/10/2025 06:40 AM    CO2 26 04/10/2025 06:40 AM    ANION 10.0 04/10/2025 06:40 AM    GLUCOSE 119 (H) 04/10/2025 06:40 AM    BUN 13 04/10/2025 06:40 AM    CREATININE 0.58 04/10/2025 06:40 AM    CALCIUM 10.1 04/10/2025 06:40 AM    ASTSGOT 39 04/10/2025 06:40 AM    ALTSGPT 45 04/10/2025 06:40 AM    TBILIRUBIN 0.8 04/10/2025 06:40 AM    ALBUMIN 4.2 04/10/2025 06:40 AM    " TOTPROTEIN 7.6 04/10/2025 06:40 AM    GLOBULIN 3.4 04/10/2025 06:40 AM    AGRATIO 1.2 04/10/2025 06:40 AM       Lab Results   Component Value Date/Time    CHOLSTRLTOT 218 (H) 05/28/2020 1001    TRIGLYCERIDE 276 (H) 05/28/2020 1001    HDL 42 05/28/2020 1001     (H) 05/28/2020 1001       Lab Results   Component Value Date/Time    MALBCRT see below 04/10/2025 06:40 AM    MICROALBUR <1.2 04/10/2025 06:40 AM    MICRALB 7.5 05/03/2023 04:28 AM        Lab Results   Component Value Date/Time    TSHULTRASEN 1.580 05/12/2016 0649     No results found for: FREEDIR  No results found for: FREET3  No results found for: THYSTIMIG        ASSESSMENT/PLAN:     1. Type 2 diabetes mellitus with hyperglycemia, with long-term current use of insulin (HCC)  Improved control  A1c is better at 6.8%  Continue  Tresiba 44u daily  Continue Ozempic 2mg weekly  We reviewed her labs  Follow up in 6 mos  with Sylvia     2. Dyslipidemia  Stable   LDL-C is at goal  Continue Crestor   Repeat fasting lipids in 12 months    3. Essential hypertension  Controlled  Repeat urine albumin in 12 mos    4. Vitamin D deficiency  Improved  Continue monitoring     5. Long-term insulin use (HCC)  Patient is on long-term basal insulin therapy for type 2 diabetes      No follow-ups on file.      Thank you kindly for allowing me to participate in the diabetes care plan for this patient.    Eris Almonte MD, FACE, NU      CC:   Yaya Frank P.A.-C.

## 2025-04-17 NOTE — PROGRESS NOTES
"Endocrinology Clinic Progress Note  PCP: Yaya Frank P.A.-C.    HPI:  Roasrio Blevins is a 64 y.o. old patient who is seen today by the Diabetes Nurse Specialist for review of  her controlled type 2 diabetes. .   Recent changes in health:  denies any changes  DM:   Last A1c:   Lab Results   Component Value Date/Time    HBA1C 6.8 (A) 04/17/2025 09:37 AM      Previous A1c was 7.1 on 9/24/2024  A1C GOAL: < 7    Diabetes Medications:   Tresiba 44 units per day  Ozempic 2 mg weekly      Exercise: states she is a  for 8 hours a day  Diet: \"healthy\" diet  in general  Patient's body mass index is 34.48 kg/m². Exercise and nutrition counseling were performed at this visit.    Glucose monitoring frequency: daily, states blood sugars running in the 110 range.   Hypoglycemic episodes: no  Last Retinal Exam: on file and up-to-date    Foot Exam:  Monofilament: current.       Plan:     Discussed and educated on:   - All medications, side effects and compliance (discussed carefully)  - HbA1C: target  - Home glucose monitoring emphasized  - Weight control and daily exercise    Recommended medication changes: none    "

## 2025-04-21 ENCOUNTER — PHARMACY VISIT (OUTPATIENT)
Dept: PHARMACY | Facility: MEDICAL CENTER | Age: 65
End: 2025-04-21
Payer: COMMERCIAL

## 2025-04-21 PROCEDURE — RXMED WILLOW AMBULATORY MEDICATION CHARGE: Performed by: PHYSICIAN ASSISTANT

## 2025-04-21 PROCEDURE — RXMED WILLOW AMBULATORY MEDICATION CHARGE: Performed by: INTERNAL MEDICINE

## 2025-04-21 RX ORDER — CLOSTRIDIUM TETANI TOXOID ANTIGEN (FORMALDEHYDE INACTIVATED), CORYNEBACTERIUM DIPHTHERIAE TOXOID ANTIGEN (FORMALDEHYDE INACTIVATED), BORDETELLA PERTUSSIS TOXOID ANTIGEN (GLUTARALDEHYDE INACTIVATED), BORDETELLA PERTUSSIS FILAMENTOUS HEMAGGLUTININ ANTIGEN (FORMALDEHYDE INACTIVATED), BORDETELLA PERTUSSIS PERTACTIN ANTIGEN, AND BORDETELLA PERTUSSIS FIMBRIAE 2/3 ANTIGEN 5; 2; 2.5; 5; 3; 5 [LF]/.5ML; [LF]/.5ML; UG/.5ML; UG/.5ML; UG/.5ML; UG/.5ML
INJECTION, SUSPENSION INTRAMUSCULAR
Qty: 0.5 ML | Refills: 0 | OUTPATIENT
Start: 2025-04-21

## 2025-04-25 PROCEDURE — RXMED WILLOW AMBULATORY MEDICATION CHARGE: Performed by: PHYSICIAN ASSISTANT

## 2025-04-28 ENCOUNTER — PHARMACY VISIT (OUTPATIENT)
Dept: PHARMACY | Facility: MEDICAL CENTER | Age: 65
End: 2025-04-28
Payer: COMMERCIAL

## 2025-04-28 DIAGNOSIS — Z79.890 HORMONE REPLACEMENT THERAPY (HRT): ICD-10-CM

## 2025-04-28 DIAGNOSIS — E78.5 DYSLIPIDEMIA: ICD-10-CM

## 2025-04-28 DIAGNOSIS — K21.9 GASTROESOPHAGEAL REFLUX DISEASE WITHOUT ESOPHAGITIS: ICD-10-CM

## 2025-04-28 PROCEDURE — RXMED WILLOW AMBULATORY MEDICATION CHARGE: Performed by: PHYSICIAN ASSISTANT

## 2025-04-28 RX ORDER — ESTRADIOL 1 MG/1
1 TABLET ORAL DAILY
Qty: 90 TABLET | Refills: 3 | Status: SHIPPED | OUTPATIENT
Start: 2025-04-28

## 2025-04-28 RX ORDER — ROSUVASTATIN CALCIUM 40 MG/1
40 TABLET, COATED ORAL DAILY
Qty: 90 TABLET | Refills: 3 | Status: SHIPPED | OUTPATIENT
Start: 2025-04-28

## 2025-04-28 RX ORDER — OMEPRAZOLE 40 MG/1
40 CAPSULE, DELAYED RELEASE ORAL DAILY
Qty: 90 CAPSULE | Refills: 3 | Status: SHIPPED | OUTPATIENT
Start: 2025-04-28

## 2025-04-28 NOTE — TELEPHONE ENCOUNTER
Received request via: Pharmacy    Was the patient seen in the last year in this department? Yes    Does the patient have an active prescription (recently filled or refills available) for medication(s) requested? No    Pharmacy Name: [email or web address]    Does the patient have FDC Plus and need 100-day supply? (This applies to ALL medications) Patient does not have SCP

## 2025-04-29 PROCEDURE — RXMED WILLOW AMBULATORY MEDICATION CHARGE: Performed by: INTERNAL MEDICINE

## 2025-05-02 ENCOUNTER — PHARMACY VISIT (OUTPATIENT)
Dept: PHARMACY | Facility: MEDICAL CENTER | Age: 65
End: 2025-05-02
Payer: COMMERCIAL

## 2025-05-14 ENCOUNTER — PHARMACY VISIT (OUTPATIENT)
Dept: PHARMACY | Facility: MEDICAL CENTER | Age: 65
End: 2025-05-14
Payer: COMMERCIAL

## 2025-05-14 ENCOUNTER — OFFICE VISIT (OUTPATIENT)
Dept: MEDICAL GROUP | Facility: MEDICAL CENTER | Age: 65
End: 2025-05-14
Payer: COMMERCIAL

## 2025-05-14 VITALS
DIASTOLIC BLOOD PRESSURE: 60 MMHG | WEIGHT: 173.2 LBS | BODY MASS INDEX: 34 KG/M2 | TEMPERATURE: 97.5 F | OXYGEN SATURATION: 97 % | HEIGHT: 60 IN | HEART RATE: 98 BPM | SYSTOLIC BLOOD PRESSURE: 130 MMHG

## 2025-05-14 DIAGNOSIS — G89.29 CHRONIC RIGHT-SIDED LOW BACK PAIN WITH RIGHT-SIDED SCIATICA: ICD-10-CM

## 2025-05-14 DIAGNOSIS — Z79.4 CONTROLLED TYPE 2 DIABETES MELLITUS WITHOUT COMPLICATION, WITH LONG-TERM CURRENT USE OF INSULIN (HCC): ICD-10-CM

## 2025-05-14 DIAGNOSIS — M54.41 CHRONIC RIGHT-SIDED LOW BACK PAIN WITH RIGHT-SIDED SCIATICA: ICD-10-CM

## 2025-05-14 DIAGNOSIS — E11.9 CONTROLLED TYPE 2 DIABETES MELLITUS WITHOUT COMPLICATION, WITH LONG-TERM CURRENT USE OF INSULIN (HCC): ICD-10-CM

## 2025-05-14 DIAGNOSIS — M25.562 ACUTE PAIN OF LEFT KNEE: Primary | ICD-10-CM

## 2025-05-14 PROCEDURE — 3075F SYST BP GE 130 - 139MM HG: CPT | Performed by: PHYSICIAN ASSISTANT

## 2025-05-14 PROCEDURE — RXMED WILLOW AMBULATORY MEDICATION CHARGE: Performed by: PHYSICIAN ASSISTANT

## 2025-05-14 PROCEDURE — RXMED WILLOW AMBULATORY MEDICATION CHARGE: Performed by: INTERNAL MEDICINE

## 2025-05-14 PROCEDURE — 3078F DIAST BP <80 MM HG: CPT | Performed by: PHYSICIAN ASSISTANT

## 2025-05-14 PROCEDURE — 99214 OFFICE O/P EST MOD 30 MIN: CPT | Performed by: PHYSICIAN ASSISTANT

## 2025-05-14 RX ORDER — IBUPROFEN 800 MG/1
800 TABLET, FILM COATED ORAL EVERY 8 HOURS PRN
Qty: 60 TABLET | Refills: 1 | Status: SHIPPED | OUTPATIENT
Start: 2025-05-14 | End: 2025-06-13

## 2025-05-14 ASSESSMENT — FIBROSIS 4 INDEX: FIB4 SCORE: 1.22

## 2025-05-14 NOTE — PROGRESS NOTES
"Subjective:     History of Present Illness  The patient presents for evaluation of acute left knee pain.    She reports an incident at work where she may have inadvertently twisted or turned her left knee, resulting in swelling that has persisted for 2 weeks. The swelling is intermittent, with periods of relief followed by recurrence. She experienced a popping sensation in her knee last night after a long day at work, which was accompanied by mild pain. She has been managing the pain with ibuprofen 800 mg, taken once or twice daily as needed, for the past 7 to 8 days. Additionally, she has been taking gabapentin, a medication previously prescribed for her sciatic nerve condition, at a dosage of one or two tablets per day. She expresses a dislike for gabapentin due to its ineffectiveness in managing her sciatic nerve pain. She has been using a brace for support but finds it uncomfortable and is considering switching to a softer brace. She has also been applying ice to her knee post-work.    She is still taking Ozempic and Tresiba. Her A1c was 6.8. She will be seeing the nurse in October.      Current medicines (including changes today)  Current Medications[1]  She  has a past medical history of Diabetes, Hyperlipidemia, and Hypertension.    ROS   No chest pain, no shortness of breath, no abdominal pain  Positive ROS as per HPI.  All other systems reviewed and are negative.     Objective:     /60 (BP Location: Left arm, Patient Position: Sitting, BP Cuff Size: Adult)   Pulse 98   Temp 36.4 °C (97.5 °F) (Temporal)   Ht 1.517 m (4' 11.72\")   Wt 78.6 kg (173 lb 3.2 oz)   SpO2 97%  Body mass index is 34.14 kg/m².   Physical Exam  Musculoskeletal: Swelling and bruising noted on the left knee.  Constitutional: Alert, no distress.  Skin: Warm, dry, good turgor, no rashes in visible areas.  Eye: Equal, round and reactive, conjunctiva clear, lids normal.  ENMT: Lips without lesions, good dentition, oropharynx " clear.  Neck: Trachea midline, no masses, no thyromegaly.   Musculoskeletal: Minimal left knee swelling noted.  No significant effusion.  Range of motion is good.  No pain on palpation bilaterally.  Psych: Alert and oriented x3, normal affect and mood.      Results  Labs   - A1c: 6.8        Assessment and Plan:   The following treatment plan was discussed    Assessment & Plan  1. Left knee pain.  Acute.   - The left knee pain could potentially be attributed to a meniscal issue, as evidenced by the significant swelling observed in a photo .  - The condition is expected to improve over time. She is advised to limit her mobility, particularly at work, to prevent further swelling.  - The use of a brace for additional support is recommended. She is also encouraged to apply ice to the affected area post-work.  - A prescription for ibuprofen 800 mg has been provided, with instructions to take it three times daily with meals for a duration of 5 to 7 days. Concurrent use of omeprazole is advised to mitigate any potential gastrointestinal side effects. If she needs a note for work, she will let us know.  - She declined imaging but may contact me through [employer] if symptoms are not improving.  May need to follow-up with orthopedics.     2. Diabetes Mellitus.  Chronic condition.  - Her A1c level has decreased from 7 to 6.8 over the past 7 months.  - She is currently taking Ozempic and Tresiba.  - She will see the nurse in October for further management of her diabetes.  - Continued monitoring of blood glucose levels and adherence to prescribed medications is advised.      ORDERS:  1. Acute pain of left knee (Primary)    - ibuprofen (MOTRIN) 800 MG Tab; Take 1 Tablet by mouth every 8 hours as needed for Moderate Pain (Back pain) for up to 30 days.  Dispense: 60 Tablet; Refill: 1    2. Chronic right-sided low back pain with right-sided sciatica      3. Controlled type 2 diabetes mellitus without complication, with long-term  current use of insulin (HCC)          Please note that this dictation was created using voice recognition software. I have made every reasonable attempt to correct obvious errors, but I expect that there are errors of grammar and possibly content that I did not discover before finalizing the note.      Attestation      Verbal consent was acquired by the patient to use [employer] ambient listening note generation during this visit Yes                  [1]   Current Outpatient Medications   Medication Sig Dispense Refill    ibuprofen (MOTRIN) 800 MG Tab Take 1 Tablet by mouth every 8 hours as needed for Moderate Pain (Back pain) for up to 30 days. 60 Tablet 1    rosuvastatin (CRESTOR) 40 MG tablet Take 1 Tablet by mouth every day. 90 Tablet 3    estradiol (ESTRACE) 1 MG Tab Take 1 Tablet by mouth every day. 90 Tablet 3    omeprazole (PRILOSEC) 40 MG delayed-release capsule Take 1 Capsule by mouth every day. 1/2 hour prior to same meal daily. 90 Capsule 3    Insulin Degludec (TRESIBA FLEXTOUCH) 200 UNIT/ML Solution Pen-injector Inject 45 Units under the skin at bedtime. 9 mL 11    Semaglutide, 2 MG/DOSE, (OZEMPIC, 2 MG/DOSE,) 8 MG/3ML Solution Pen-injector Inject 2 MG under the skin every 7 days 3 mL 3    amLODIPine (NORVASC) 5 MG Tab Take 1 Tablet by mouth every day. 90 Tablet 3    lisinopril (PRINIVIL) 20 MG Tab Take 1 Tablet by mouth every day. 90 Tablet 2     No current facility-administered medications for this visit.

## 2025-05-15 ENCOUNTER — PHARMACY VISIT (OUTPATIENT)
Dept: PHARMACY | Facility: MEDICAL CENTER | Age: 65
End: 2025-05-15
Payer: COMMERCIAL

## 2025-05-19 ENCOUNTER — PHARMACY VISIT (OUTPATIENT)
Dept: PHARMACY | Facility: MEDICAL CENTER | Age: 65
End: 2025-05-19
Payer: COMMERCIAL

## 2025-05-19 DIAGNOSIS — I10 ESSENTIAL HYPERTENSION: ICD-10-CM

## 2025-05-19 PROCEDURE — RXMED WILLOW AMBULATORY MEDICATION CHARGE: Performed by: PHYSICIAN ASSISTANT

## 2025-05-19 RX ORDER — LISINOPRIL 20 MG/1
20 TABLET ORAL DAILY
Qty: 90 TABLET | Refills: 2 | Status: SHIPPED | OUTPATIENT
Start: 2025-05-19

## 2025-06-03 PROCEDURE — RXMED WILLOW AMBULATORY MEDICATION CHARGE: Performed by: INTERNAL MEDICINE

## 2025-06-04 ENCOUNTER — PHARMACY VISIT (OUTPATIENT)
Dept: PHARMACY | Facility: MEDICAL CENTER | Age: 65
End: 2025-06-04
Payer: COMMERCIAL

## 2025-06-09 PROCEDURE — RXMED WILLOW AMBULATORY MEDICATION CHARGE: Performed by: INTERNAL MEDICINE

## 2025-06-10 ENCOUNTER — PHARMACY VISIT (OUTPATIENT)
Dept: PHARMACY | Facility: MEDICAL CENTER | Age: 65
End: 2025-06-10
Payer: COMMERCIAL

## 2025-06-26 PROCEDURE — RXMED WILLOW AMBULATORY MEDICATION CHARGE: Performed by: INTERNAL MEDICINE

## 2025-06-27 ENCOUNTER — PHARMACY VISIT (OUTPATIENT)
Dept: PHARMACY | Facility: MEDICAL CENTER | Age: 65
End: 2025-06-27
Payer: COMMERCIAL

## 2025-06-30 ENCOUNTER — OFFICE VISIT (OUTPATIENT)
Dept: MEDICAL GROUP | Facility: MEDICAL CENTER | Age: 65
End: 2025-06-30
Payer: COMMERCIAL

## 2025-06-30 ENCOUNTER — PHARMACY VISIT (OUTPATIENT)
Dept: PHARMACY | Facility: MEDICAL CENTER | Age: 65
End: 2025-06-30
Payer: COMMERCIAL

## 2025-06-30 VITALS
BODY MASS INDEX: 32.67 KG/M2 | WEIGHT: 166.4 LBS | HEART RATE: 91 BPM | OXYGEN SATURATION: 96 % | SYSTOLIC BLOOD PRESSURE: 130 MMHG | DIASTOLIC BLOOD PRESSURE: 62 MMHG | TEMPERATURE: 97.8 F | HEIGHT: 60 IN

## 2025-06-30 DIAGNOSIS — Z79.4 CONTROLLED TYPE 2 DIABETES MELLITUS WITHOUT COMPLICATION, WITH LONG-TERM CURRENT USE OF INSULIN (HCC): ICD-10-CM

## 2025-06-30 DIAGNOSIS — E11.9 CONTROLLED TYPE 2 DIABETES MELLITUS WITHOUT COMPLICATION, WITH LONG-TERM CURRENT USE OF INSULIN (HCC): ICD-10-CM

## 2025-06-30 DIAGNOSIS — J01.10 ACUTE NON-RECURRENT FRONTAL SINUSITIS: Primary | ICD-10-CM

## 2025-06-30 PROBLEM — J01.40 ACUTE NON-RECURRENT PANSINUSITIS: Status: ACTIVE | Noted: 2025-06-30

## 2025-06-30 PROCEDURE — RXMED WILLOW AMBULATORY MEDICATION CHARGE: Performed by: PHYSICIAN ASSISTANT

## 2025-06-30 RX ORDER — AMOXICILLIN 875 MG/1
875 TABLET, COATED ORAL 2 TIMES DAILY
Qty: 14 TABLET | Refills: 0 | Status: SHIPPED | OUTPATIENT
Start: 2025-06-30 | End: 2025-07-07

## 2025-06-30 ASSESSMENT — FIBROSIS 4 INDEX: FIB4 SCORE: 1.22

## 2025-06-30 NOTE — PROGRESS NOTES
"Subjective:     History of Present Illness  The patient presents for evaluation of sinus headache.    She has been experiencing sinus headaches, characterized by pressure in the frontal area and ears, for approximately 2.5 weeks. She also reports intermittent fevers, although she has not measured her temperature. Her appetite has decreased, leading to some weight loss. Despite trying various over-the-counter medications, she has found no relief. She was previously prescribed antibiotics and steroids but expresses a dislike for the latter. She recalls a similar episode last month, which she attributed to exhaustion from moving. She mentions that everyone at work has been sick, and she has continued to work despite her symptoms.    She continues to take Ozempic, insulin, rosuvastatin, lisinopril, amlodipine, omeprazole, and estradiol.    She has an appointment in October with the nurse with a endocrinology.  Diabetes has been well-controlled.      Current medicines (including changes today)  Current Medications[1]  She  has a past medical history of Diabetes, Hyperlipidemia, and Hypertension.    ROS   No chest pain, no shortness of breath, no abdominal pain  Positive ROS as per HPI.  All other systems reviewed and are negative.     Objective:     /62 (BP Location: Left arm, Patient Position: Sitting, BP Cuff Size: Adult)   Pulse 91   Temp 36.6 °C (97.8 °F) (Temporal)   Ht 1.52 m (4' 11.84\")   Wt 75.5 kg (166 lb 6.4 oz)   SpO2 96%  Body mass index is 32.67 kg/m².   Physical Exam  Ears: Normal left appearance, no abnormalities noted.  Constitutional: Alert, no distress.  Skin: Warm, dry, good turgor, no rashes in visible areas.  Eye: Equal, round and reactive, conjunctiva clear, lids normal.  ENMT: Lips without lesions, good dentition, oropharynx clear.  Neck: Trachea midline, no masses, no thyromegaly.   Psych: Alert and oriented x3, normal affect and mood.      Results          Assessment and Plan:   The " following treatment plan was discussed    Assessment & Plan  1. Sinusitis. Acute.  - Reports sinus headache with pressure in the frontal area and ears, intermittent fever, and loss of appetite.  - Over-the-counter medications have not been effective.  - Examination reveals pressure from fluid in the ears.  - Prescription for amoxicillin 875 mg, one tablet twice daily for 7 days, provided. Advised to keep the provider updated on her condition.    2.  Type 2 diabetes.  - Chronic condition.  Stable.  Continue insulin and Ozempic as directed.  Follow with endocrinology.      ORDERS:  1. Acute non-recurrent frontal sinusitis (Primary)    - amoxicillin (AMOXIL) 875 MG tablet; Take 1 Tablet by mouth 2 times a day for 7 days.  Dispense: 14 Tablet; Refill: 0    2. Controlled type 2 diabetes mellitus without complication, with long-term current use of insulin (HCC)          Please note that this dictation was created using voice recognition software. I have made every reasonable attempt to correct obvious errors, but I expect that there are errors of grammar and possibly content that I did not discover before finalizing the note.      Attestation      Verbal consent was acquired by the patient to use [employer] ambient listening note generation during this visit Yes                  [1]   Current Outpatient Medications   Medication Sig Dispense Refill    amoxicillin (AMOXIL) 875 MG tablet Take 1 Tablet by mouth 2 times a day for 7 days. 14 Tablet 0    lisinopril (PRINIVIL) 20 MG Tab Take 1 Tablet by mouth every day. 90 Tablet 2    rosuvastatin (CRESTOR) 40 MG tablet Take 1 Tablet by mouth every day. 90 Tablet 3    estradiol (ESTRACE) 1 MG Tab Take 1 Tablet by mouth every day. 90 Tablet 3    omeprazole (PRILOSEC) 40 MG delayed-release capsule Take 1 Capsule by mouth every day. 1/2 hour prior to same meal daily. 90 Capsule 3    Insulin Degludec (TRESIBA FLEXTOUCH) 200 UNIT/ML Solution Pen-injector Inject 45 Units under the  skin at bedtime. 9 mL 11    Semaglutide, 2 MG/DOSE, (OZEMPIC, 2 MG/DOSE,) 8 MG/3ML Solution Pen-injector Inject 2 MG under the skin every 7 days 3 mL 3    amLODIPine (NORVASC) 5 MG Tab Take 1 Tablet by mouth every day. 90 Tablet 3     No current facility-administered medications for this visit.

## 2025-07-10 PROCEDURE — RXMED WILLOW AMBULATORY MEDICATION CHARGE: Performed by: INTERNAL MEDICINE

## 2025-07-11 ENCOUNTER — PHARMACY VISIT (OUTPATIENT)
Dept: PHARMACY | Facility: MEDICAL CENTER | Age: 65
End: 2025-07-11
Payer: COMMERCIAL

## 2025-07-21 ENCOUNTER — PHARMACY VISIT (OUTPATIENT)
Dept: PHARMACY | Facility: MEDICAL CENTER | Age: 65
End: 2025-07-21
Payer: COMMERCIAL

## 2025-07-21 PROCEDURE — RXMED WILLOW AMBULATORY MEDICATION CHARGE: Performed by: PHYSICIAN ASSISTANT

## 2025-07-23 PROCEDURE — RXMED WILLOW AMBULATORY MEDICATION CHARGE: Performed by: INTERNAL MEDICINE

## 2025-07-24 ENCOUNTER — PHARMACY VISIT (OUTPATIENT)
Dept: PHARMACY | Facility: MEDICAL CENTER | Age: 65
End: 2025-07-24
Payer: COMMERCIAL

## 2025-07-25 ENCOUNTER — PHARMACY VISIT (OUTPATIENT)
Dept: PHARMACY | Facility: MEDICAL CENTER | Age: 65
End: 2025-07-25
Payer: COMMERCIAL

## 2025-07-25 PROCEDURE — RXMED WILLOW AMBULATORY MEDICATION CHARGE: Performed by: PHYSICIAN ASSISTANT

## 2025-07-31 PROCEDURE — RXMED WILLOW AMBULATORY MEDICATION CHARGE: Performed by: INTERNAL MEDICINE

## 2025-08-01 ENCOUNTER — PHARMACY VISIT (OUTPATIENT)
Dept: PHARMACY | Facility: MEDICAL CENTER | Age: 65
End: 2025-08-01
Payer: COMMERCIAL

## 2025-08-01 DIAGNOSIS — Z79.4 CONTROLLED TYPE 2 DIABETES MELLITUS WITHOUT COMPLICATION, WITH LONG-TERM CURRENT USE OF INSULIN (HCC): ICD-10-CM

## 2025-08-01 DIAGNOSIS — E11.9 CONTROLLED TYPE 2 DIABETES MELLITUS WITHOUT COMPLICATION, WITH LONG-TERM CURRENT USE OF INSULIN (HCC): ICD-10-CM

## 2025-08-03 RX ORDER — SEMAGLUTIDE 2.68 MG/ML
INJECTION, SOLUTION SUBCUTANEOUS
Qty: 9 ML | Refills: 3 | Status: SHIPPED | OUTPATIENT
Start: 2025-08-03

## 2025-08-12 ENCOUNTER — PHARMACY VISIT (OUTPATIENT)
Dept: PHARMACY | Facility: MEDICAL CENTER | Age: 65
End: 2025-08-12
Payer: COMMERCIAL

## 2025-08-12 PROCEDURE — RXMED WILLOW AMBULATORY MEDICATION CHARGE: Performed by: PHYSICIAN ASSISTANT

## 2025-08-12 RX ORDER — MELOXICAM 7.5 MG/1
7.5 TABLET ORAL 2 TIMES DAILY PRN
Qty: 60 TABLET | Refills: 2 | Status: SHIPPED | OUTPATIENT
Start: 2025-08-12

## 2025-08-18 PROCEDURE — RXMED WILLOW AMBULATORY MEDICATION CHARGE: Performed by: PHYSICIAN ASSISTANT

## 2025-08-19 ENCOUNTER — PHARMACY VISIT (OUTPATIENT)
Dept: PHARMACY | Facility: MEDICAL CENTER | Age: 65
End: 2025-08-19
Payer: COMMERCIAL

## 2025-08-19 PROCEDURE — RXMED WILLOW AMBULATORY MEDICATION CHARGE: Performed by: INTERNAL MEDICINE

## 2025-08-21 ENCOUNTER — PHARMACY VISIT (OUTPATIENT)
Dept: PHARMACY | Facility: MEDICAL CENTER | Age: 65
End: 2025-08-21
Payer: COMMERCIAL

## 2025-10-22 ENCOUNTER — APPOINTMENT (OUTPATIENT)
Dept: MEDICAL GROUP | Facility: MEDICAL CENTER | Age: 65
End: 2025-10-22
Payer: COMMERCIAL